# Patient Record
Sex: MALE | Race: WHITE | NOT HISPANIC OR LATINO | Employment: FULL TIME | URBAN - METROPOLITAN AREA
[De-identification: names, ages, dates, MRNs, and addresses within clinical notes are randomized per-mention and may not be internally consistent; named-entity substitution may affect disease eponyms.]

---

## 2017-02-22 RX ORDER — TAMSULOSIN HYDROCHLORIDE 0.4 MG/1
CAPSULE ORAL
Qty: 90 CAPSULE | Refills: 1 | Status: SHIPPED | OUTPATIENT
Start: 2017-02-22 | End: 2017-04-06 | Stop reason: SDUPTHER

## 2017-04-06 ENCOUNTER — TELEPHONE (OUTPATIENT)
Dept: INTERNAL MEDICINE | Facility: CLINIC | Age: 62
End: 2017-04-06

## 2017-04-06 RX ORDER — TAMSULOSIN HYDROCHLORIDE 0.4 MG/1
1 CAPSULE ORAL DAILY
Qty: 90 CAPSULE | Refills: 1 | Status: SHIPPED | OUTPATIENT
Start: 2017-04-06 | End: 2017-09-10 | Stop reason: SDUPTHER

## 2017-04-06 NOTE — TELEPHONE ENCOUNTER
----- Message from Anjelica Cabral sent at 4/5/2017  3:38 PM EDT -----  Refill: tamsuloson 0.4 mg (90 day supply ) @ [facility]

## 2017-04-18 RX ORDER — NIFEDIPINE 30 MG/1
TABLET, EXTENDED RELEASE ORAL
Qty: 90 TABLET | Refills: 0 | Status: SHIPPED | OUTPATIENT
Start: 2017-04-18 | End: 2017-05-19 | Stop reason: SDUPTHER

## 2017-04-18 RX ORDER — OMEPRAZOLE 20 MG/1
CAPSULE, DELAYED RELEASE ORAL
Qty: 90 CAPSULE | Refills: 0 | Status: SHIPPED | OUTPATIENT
Start: 2017-04-18 | End: 2017-05-19 | Stop reason: SDUPTHER

## 2017-05-19 ENCOUNTER — OFFICE VISIT (OUTPATIENT)
Dept: INTERNAL MEDICINE | Facility: CLINIC | Age: 62
End: 2017-05-19

## 2017-05-19 VITALS
DIASTOLIC BLOOD PRESSURE: 80 MMHG | WEIGHT: 160 LBS | SYSTOLIC BLOOD PRESSURE: 132 MMHG | HEIGHT: 74 IN | TEMPERATURE: 97.8 F | BODY MASS INDEX: 20.53 KG/M2

## 2017-05-19 DIAGNOSIS — N40.0 BENIGN PROSTATIC HYPERPLASIA, PRESENCE OF LOWER URINARY TRACT SYMPTOMS UNSPECIFIED, UNSPECIFIED MORPHOLOGY: ICD-10-CM

## 2017-05-19 DIAGNOSIS — C14.0 THROAT CANCER (HCC): ICD-10-CM

## 2017-05-19 DIAGNOSIS — Z11.59 NEED FOR HEPATITIS C SCREENING TEST: ICD-10-CM

## 2017-05-19 DIAGNOSIS — E03.9 ACQUIRED HYPOTHYROIDISM: ICD-10-CM

## 2017-05-19 DIAGNOSIS — E03.9 HYPOTHYROIDISM, UNSPECIFIED TYPE: ICD-10-CM

## 2017-05-19 DIAGNOSIS — I10 BENIGN ESSENTIAL HYPERTENSION: Primary | ICD-10-CM

## 2017-05-19 DIAGNOSIS — K21.9 GASTROESOPHAGEAL REFLUX DISEASE, ESOPHAGITIS PRESENCE NOT SPECIFIED: ICD-10-CM

## 2017-05-19 LAB
25(OH)D3 SERPL-MCNC: 26.6 NG/ML
ALBUMIN SERPL-MCNC: 4.3 G/DL (ref 3.2–4.8)
ALBUMIN/GLOB SERPL: 1.9 G/DL (ref 1.5–2.5)
ALP SERPL-CCNC: 62 U/L (ref 25–100)
ALT SERPL W P-5'-P-CCNC: 15 U/L (ref 7–40)
ANION GAP SERPL CALCULATED.3IONS-SCNC: 2 MMOL/L (ref 3–11)
AST SERPL-CCNC: 21 U/L (ref 0–33)
BASOPHILS # BLD AUTO: 0.08 10*3/MM3 (ref 0–0.2)
BASOPHILS NFR BLD AUTO: 1.4 % (ref 0–1)
BILIRUB SERPL-MCNC: 0.5 MG/DL (ref 0.3–1.2)
BUN BLD-MCNC: 19 MG/DL (ref 9–23)
BUN/CREAT SERPL: 21.1 (ref 7–25)
CALCIUM SPEC-SCNC: 10 MG/DL (ref 8.7–10.4)
CHLORIDE SERPL-SCNC: 107 MMOL/L (ref 99–109)
CO2 SERPL-SCNC: 30 MMOL/L (ref 20–31)
CREAT BLD-MCNC: 0.9 MG/DL (ref 0.6–1.3)
DEPRECATED RDW RBC AUTO: 41.5 FL (ref 37–54)
EOSINOPHIL # BLD AUTO: 0.25 10*3/MM3 (ref 0.1–0.3)
EOSINOPHIL NFR BLD AUTO: 4.3 % (ref 0–3)
ERYTHROCYTE [DISTWIDTH] IN BLOOD BY AUTOMATED COUNT: 13.2 % (ref 11.3–14.5)
GFR SERPL CREATININE-BSD FRML MDRD: 86 ML/MIN/1.73
GLOBULIN UR ELPH-MCNC: 2.3 GM/DL
GLUCOSE BLD-MCNC: 90 MG/DL (ref 70–100)
HCT VFR BLD AUTO: 43.3 % (ref 38.9–50.9)
HCV AB SER DONR QL: NORMAL
HGB BLD-MCNC: 14.5 G/DL (ref 13.1–17.5)
IMM GRANULOCYTES # BLD: 0.02 10*3/MM3 (ref 0–0.03)
IMM GRANULOCYTES NFR BLD: 0.3 % (ref 0–0.6)
LYMPHOCYTES # BLD AUTO: 1.11 10*3/MM3 (ref 0.6–4.8)
LYMPHOCYTES NFR BLD AUTO: 19.1 % (ref 24–44)
MCH RBC QN AUTO: 28.6 PG (ref 27–31)
MCHC RBC AUTO-ENTMCNC: 33.5 G/DL (ref 32–36)
MCV RBC AUTO: 85.4 FL (ref 80–99)
MONOCYTES # BLD AUTO: 0.82 10*3/MM3 (ref 0–1)
MONOCYTES NFR BLD AUTO: 14.1 % (ref 0–12)
NEUTROPHILS # BLD AUTO: 3.53 10*3/MM3 (ref 1.5–8.3)
NEUTROPHILS NFR BLD AUTO: 60.8 % (ref 41–71)
PLATELET # BLD AUTO: 168 10*3/MM3 (ref 150–450)
PMV BLD AUTO: 10.7 FL (ref 6–12)
POTASSIUM BLD-SCNC: 3.8 MMOL/L (ref 3.5–5.5)
PROT SERPL-MCNC: 6.6 G/DL (ref 5.7–8.2)
RBC # BLD AUTO: 5.07 10*6/MM3 (ref 4.2–5.76)
SODIUM BLD-SCNC: 139 MMOL/L (ref 132–146)
TSH SERPL DL<=0.05 MIU/L-ACNC: 0.94 MIU/ML (ref 0.35–5.35)
VIT B12 BLD-MCNC: 408 PG/ML (ref 211–911)
WBC NRBC COR # BLD: 5.81 10*3/MM3 (ref 3.5–10.8)

## 2017-05-19 PROCEDURE — 82306 VITAMIN D 25 HYDROXY: CPT | Performed by: NURSE PRACTITIONER

## 2017-05-19 PROCEDURE — 82607 VITAMIN B-12: CPT | Performed by: NURSE PRACTITIONER

## 2017-05-19 PROCEDURE — 80053 COMPREHEN METABOLIC PANEL: CPT | Performed by: NURSE PRACTITIONER

## 2017-05-19 PROCEDURE — 85025 COMPLETE CBC W/AUTO DIFF WBC: CPT | Performed by: NURSE PRACTITIONER

## 2017-05-19 PROCEDURE — 99214 OFFICE O/P EST MOD 30 MIN: CPT | Performed by: NURSE PRACTITIONER

## 2017-05-19 PROCEDURE — 86803 HEPATITIS C AB TEST: CPT | Performed by: NURSE PRACTITIONER

## 2017-05-19 PROCEDURE — 84443 ASSAY THYROID STIM HORMONE: CPT | Performed by: NURSE PRACTITIONER

## 2017-05-19 RX ORDER — MINOCYCLINE HYDROCHLORIDE 100 MG/1
CAPSULE ORAL
COMMUNITY
Start: 2017-05-18 | End: 2018-02-06

## 2017-05-19 RX ORDER — LEVOTHYROXINE SODIUM 0.1 MG/1
100 TABLET ORAL DAILY
Qty: 90 TABLET | Refills: 1 | Status: SHIPPED | OUTPATIENT
Start: 2017-05-19 | End: 2018-01-09 | Stop reason: SDUPTHER

## 2017-05-19 RX ORDER — NIFEDIPINE 30 MG/1
30 TABLET, EXTENDED RELEASE ORAL DAILY
Qty: 90 TABLET | Refills: 1 | Status: SHIPPED | OUTPATIENT
Start: 2017-05-19 | End: 2017-12-01 | Stop reason: SDUPTHER

## 2017-05-19 RX ORDER — OMEPRAZOLE 20 MG/1
20 CAPSULE, DELAYED RELEASE ORAL DAILY
Qty: 90 CAPSULE | Refills: 1 | Status: SHIPPED | OUTPATIENT
Start: 2017-05-19 | End: 2017-12-01 | Stop reason: SDUPTHER

## 2017-09-11 RX ORDER — TAMSULOSIN HYDROCHLORIDE 0.4 MG/1
CAPSULE ORAL
Qty: 90 CAPSULE | Refills: 0 | Status: SHIPPED | OUTPATIENT
Start: 2017-09-11 | End: 2017-12-01 | Stop reason: SDUPTHER

## 2017-12-01 DIAGNOSIS — I10 BENIGN ESSENTIAL HYPERTENSION: ICD-10-CM

## 2017-12-01 DIAGNOSIS — K21.9 GASTROESOPHAGEAL REFLUX DISEASE, ESOPHAGITIS PRESENCE NOT SPECIFIED: ICD-10-CM

## 2017-12-01 RX ORDER — TAMSULOSIN HYDROCHLORIDE 0.4 MG/1
CAPSULE ORAL
Qty: 90 CAPSULE | Refills: 0 | Status: SHIPPED | OUTPATIENT
Start: 2017-12-01 | End: 2018-01-09 | Stop reason: SDUPTHER

## 2017-12-01 RX ORDER — NIFEDIPINE 30 MG/1
TABLET, EXTENDED RELEASE ORAL
Qty: 90 TABLET | Refills: 0 | Status: SHIPPED | OUTPATIENT
Start: 2017-12-01 | End: 2018-01-09 | Stop reason: SDUPTHER

## 2017-12-01 RX ORDER — OMEPRAZOLE 20 MG/1
CAPSULE, DELAYED RELEASE ORAL
Qty: 90 CAPSULE | Refills: 0 | Status: SHIPPED | OUTPATIENT
Start: 2017-12-01 | End: 2018-01-09 | Stop reason: SDUPTHER

## 2018-01-09 ENCOUNTER — TELEPHONE (OUTPATIENT)
Dept: INTERNAL MEDICINE | Facility: CLINIC | Age: 63
End: 2018-01-09

## 2018-01-09 DIAGNOSIS — I10 BENIGN ESSENTIAL HYPERTENSION: ICD-10-CM

## 2018-01-09 DIAGNOSIS — K21.9 GASTROESOPHAGEAL REFLUX DISEASE, ESOPHAGITIS PRESENCE NOT SPECIFIED: ICD-10-CM

## 2018-01-09 DIAGNOSIS — E03.9 ACQUIRED HYPOTHYROIDISM: ICD-10-CM

## 2018-01-09 RX ORDER — OMEPRAZOLE 20 MG/1
20 CAPSULE, DELAYED RELEASE ORAL DAILY
Qty: 30 CAPSULE | Refills: 0 | Status: SHIPPED | OUTPATIENT
Start: 2018-01-09 | End: 2018-02-06 | Stop reason: SDUPTHER

## 2018-01-09 RX ORDER — TAMSULOSIN HYDROCHLORIDE 0.4 MG/1
1 CAPSULE ORAL DAILY
Qty: 30 CAPSULE | Refills: 0 | Status: SHIPPED | OUTPATIENT
Start: 2018-01-09 | End: 2018-02-06 | Stop reason: SDUPTHER

## 2018-01-09 RX ORDER — LEVOTHYROXINE SODIUM 0.1 MG/1
100 TABLET ORAL DAILY
Qty: 30 TABLET | Refills: 0 | Status: SHIPPED | OUTPATIENT
Start: 2018-01-09 | End: 2018-02-16 | Stop reason: SDUPTHER

## 2018-01-09 RX ORDER — NIFEDIPINE 30 MG/1
30 TABLET, EXTENDED RELEASE ORAL DAILY
Qty: 30 TABLET | Refills: 0 | Status: SHIPPED | OUTPATIENT
Start: 2018-01-09 | End: 2018-02-06 | Stop reason: SDUPTHER

## 2018-01-09 NOTE — TELEPHONE ENCOUNTER
PATIENT HAS UPCOMING APPOINTMENT ON 2/6 BUT NEEDS REFILLS ON [facility] NIFEdipine XL (PROCARDIA XL) 30 MG 24 hr tablet levothyroxine (SYNTHROID) 100 MCG tabletomeprazole (priLOSEC) 20 MG capsule tamsulosin (FLOMAX) 0.4 MG capsule 24 hr capsule

## 2018-02-06 ENCOUNTER — OFFICE VISIT (OUTPATIENT)
Dept: INTERNAL MEDICINE | Facility: CLINIC | Age: 63
End: 2018-02-06

## 2018-02-06 VITALS
SYSTOLIC BLOOD PRESSURE: 144 MMHG | RESPIRATION RATE: 16 BRPM | BODY MASS INDEX: 21.58 KG/M2 | DIASTOLIC BLOOD PRESSURE: 80 MMHG | OXYGEN SATURATION: 98 % | WEIGHT: 168.2 LBS | TEMPERATURE: 98.7 F | HEIGHT: 74 IN | HEART RATE: 81 BPM

## 2018-02-06 DIAGNOSIS — N52.9 IMPOTENCE OF ORGANIC ORIGIN: ICD-10-CM

## 2018-02-06 DIAGNOSIS — N40.0 BENIGN PROSTATIC HYPERPLASIA, UNSPECIFIED WHETHER LOWER URINARY TRACT SYMPTOMS PRESENT: ICD-10-CM

## 2018-02-06 DIAGNOSIS — E03.9 ACQUIRED HYPOTHYROIDISM: Primary | ICD-10-CM

## 2018-02-06 DIAGNOSIS — K21.9 GASTROESOPHAGEAL REFLUX DISEASE, ESOPHAGITIS PRESENCE NOT SPECIFIED: ICD-10-CM

## 2018-02-06 DIAGNOSIS — I10 BENIGN ESSENTIAL HYPERTENSION: ICD-10-CM

## 2018-02-06 LAB
25(OH)D3 SERPL-MCNC: 17 NG/ML
ALBUMIN SERPL-MCNC: 4.7 G/DL (ref 3.2–4.8)
ALBUMIN/GLOB SERPL: 2 G/DL (ref 1.5–2.5)
ALP SERPL-CCNC: 63 U/L (ref 25–100)
ALT SERPL W P-5'-P-CCNC: 19 U/L (ref 7–40)
ANION GAP SERPL CALCULATED.3IONS-SCNC: 5 MMOL/L (ref 3–11)
ARTICHOKE IGE QN: 128 MG/DL (ref 0–130)
AST SERPL-CCNC: 26 U/L (ref 0–33)
BASOPHILS # BLD AUTO: 0.06 10*3/MM3 (ref 0–0.2)
BASOPHILS NFR BLD AUTO: 1 % (ref 0–1)
BILIRUB SERPL-MCNC: 0.6 MG/DL (ref 0.3–1.2)
BUN BLD-MCNC: 15 MG/DL (ref 9–23)
BUN/CREAT SERPL: 18.8 (ref 7–25)
CALCIUM SPEC-SCNC: 9.3 MG/DL (ref 8.7–10.4)
CHLORIDE SERPL-SCNC: 104 MMOL/L (ref 99–109)
CHOLEST SERPL-MCNC: 190 MG/DL (ref 0–200)
CO2 SERPL-SCNC: 32 MMOL/L (ref 20–31)
CREAT BLD-MCNC: 0.8 MG/DL (ref 0.6–1.3)
DEPRECATED RDW RBC AUTO: 40.4 FL (ref 37–54)
EOSINOPHIL # BLD AUTO: 0.2 10*3/MM3 (ref 0–0.3)
EOSINOPHIL NFR BLD AUTO: 3.2 % (ref 0–3)
ERYTHROCYTE [DISTWIDTH] IN BLOOD BY AUTOMATED COUNT: 13.1 % (ref 11.3–14.5)
GFR SERPL CREATININE-BSD FRML MDRD: 99 ML/MIN/1.73
GLOBULIN UR ELPH-MCNC: 2.3 GM/DL
GLUCOSE BLD-MCNC: 94 MG/DL (ref 70–100)
HCT VFR BLD AUTO: 45.5 % (ref 38.9–50.9)
HDLC SERPL-MCNC: 63 MG/DL (ref 40–60)
HGB BLD-MCNC: 15.3 G/DL (ref 13.1–17.5)
IMM GRANULOCYTES # BLD: 0.02 10*3/MM3 (ref 0–0.03)
IMM GRANULOCYTES NFR BLD: 0.3 % (ref 0–0.6)
LYMPHOCYTES # BLD AUTO: 1.41 10*3/MM3 (ref 0.6–4.8)
LYMPHOCYTES NFR BLD AUTO: 22.4 % (ref 24–44)
MCH RBC QN AUTO: 28.5 PG (ref 27–31)
MCHC RBC AUTO-ENTMCNC: 33.6 G/DL (ref 32–36)
MCV RBC AUTO: 84.9 FL (ref 80–99)
MONOCYTES # BLD AUTO: 0.79 10*3/MM3 (ref 0–1)
MONOCYTES NFR BLD AUTO: 12.5 % (ref 0–12)
NEUTROPHILS # BLD AUTO: 3.82 10*3/MM3 (ref 1.5–8.3)
NEUTROPHILS NFR BLD AUTO: 60.6 % (ref 41–71)
PLATELET # BLD AUTO: 189 10*3/MM3 (ref 150–450)
PMV BLD AUTO: 10.9 FL (ref 6–12)
POTASSIUM BLD-SCNC: 3.8 MMOL/L (ref 3.5–5.5)
PROT SERPL-MCNC: 7 G/DL (ref 5.7–8.2)
RBC # BLD AUTO: 5.36 10*6/MM3 (ref 4.2–5.76)
SODIUM BLD-SCNC: 141 MMOL/L (ref 132–146)
TRIGL SERPL-MCNC: 84 MG/DL (ref 0–150)
TSH SERPL DL<=0.05 MIU/L-ACNC: 1.4 MIU/ML (ref 0.35–5.35)
VIT B12 BLD-MCNC: 520 PG/ML (ref 211–911)
WBC NRBC COR # BLD: 6.3 10*3/MM3 (ref 3.5–10.8)

## 2018-02-06 PROCEDURE — 85025 COMPLETE CBC W/AUTO DIFF WBC: CPT | Performed by: NURSE PRACTITIONER

## 2018-02-06 PROCEDURE — 80053 COMPREHEN METABOLIC PANEL: CPT | Performed by: NURSE PRACTITIONER

## 2018-02-06 PROCEDURE — 82607 VITAMIN B-12: CPT | Performed by: NURSE PRACTITIONER

## 2018-02-06 PROCEDURE — 84443 ASSAY THYROID STIM HORMONE: CPT | Performed by: NURSE PRACTITIONER

## 2018-02-06 PROCEDURE — 80061 LIPID PANEL: CPT | Performed by: NURSE PRACTITIONER

## 2018-02-06 PROCEDURE — 99214 OFFICE O/P EST MOD 30 MIN: CPT | Performed by: NURSE PRACTITIONER

## 2018-02-06 PROCEDURE — 82306 VITAMIN D 25 HYDROXY: CPT | Performed by: NURSE PRACTITIONER

## 2018-02-06 RX ORDER — NIFEDIPINE 30 MG/1
30 TABLET, EXTENDED RELEASE ORAL DAILY
Qty: 90 TABLET | Refills: 1 | Status: SHIPPED | OUTPATIENT
Start: 2018-02-06 | End: 2018-08-30 | Stop reason: SDUPTHER

## 2018-02-06 RX ORDER — TAMSULOSIN HYDROCHLORIDE 0.4 MG/1
1 CAPSULE ORAL DAILY
Qty: 90 CAPSULE | Refills: 1 | Status: SHIPPED | OUTPATIENT
Start: 2018-02-06 | End: 2018-09-02 | Stop reason: SDUPTHER

## 2018-02-06 RX ORDER — LEVOTHYROXINE SODIUM 0.1 MG/1
100 TABLET ORAL DAILY
Qty: 90 TABLET | Refills: 1 | Status: CANCELLED | OUTPATIENT
Start: 2018-02-06

## 2018-02-06 RX ORDER — SILDENAFIL CITRATE 20 MG/1
TABLET ORAL
Qty: 50 TABLET | Refills: 2 | Status: SHIPPED | OUTPATIENT
Start: 2018-02-06 | End: 2019-02-05 | Stop reason: SDUPTHER

## 2018-02-06 RX ORDER — OMEPRAZOLE 20 MG/1
20 CAPSULE, DELAYED RELEASE ORAL DAILY
Qty: 90 CAPSULE | Refills: 1 | Status: SHIPPED | OUTPATIENT
Start: 2018-02-06 | End: 2020-01-14 | Stop reason: SDUPTHER

## 2018-02-06 NOTE — PROGRESS NOTES
Subjective   Booen Riley is a 62 y.o. male    Chief Complaint   Patient presents with   • Follow-up     Fasting for lab work   • Hypothyroidism   • Hypertension   • Heartburn     History of Present Illness     Here for f/u.  Last OV was in May 2017    BPH - Seeing Urology (Mario).  Has him on the Flomax.  Takes PRN meds for ED.  He would like a script for the 20mg Sildenafil as he has heard it would be much less expensive.       HTN is well controlled on Procardia XL 30mg; did not tolerate an ACE or an ARB.  BP has been running in the 120-130/70-75 at home.        Hypothyroid - currently taking 100mcg of Synthroid; does feel more tired than normal      GERD - chronic and stable on Omeprazole; does well as long as he takes this daily      H/O throat (base of tongue) cancer.  Followed by Dr. CAMACHO.  All recent reports have been good.  Has an upcoming scope and will more than likely be dilated.        Flu shot - fall 2017  Tdap - 2014  PSA - per urology  colon - due 2018; they have contacted him to schedule    The following portions of the patient's history were reviewed and updated as appropriate: allergies, current medications, past family history, past medical history, past social history, past surgical history and problem list.    Current Outpatient Prescriptions:   •  levothyroxine (SYNTHROID) 100 MCG tablet, Take 1 tablet by mouth Daily., Disp: 30 tablet, Rfl: 0  •  NIFEdipine XL (PROCARDIA XL) 30 MG 24 hr tablet, Take 1 tablet by mouth Daily., Disp: 90 tablet, Rfl: 1  •  omeprazole (priLOSEC) 20 MG capsule, Take 1 capsule by mouth Daily., Disp: 90 capsule, Rfl: 1  •  tamsulosin (FLOMAX) 0.4 MG capsule 24 hr capsule, Take 1 capsule by mouth Daily., Disp: 90 capsule, Rfl: 1  •  minocycline (MINOCIN,DYNACIN) 100 MG capsule, , Disp: , Rfl:   •  pilocarpine (SALAGEN) 7.5 MG tablet, take 1 tablet by mouth once daily, Disp: , Rfl: 0  •  sildenafil (REVATIO) 20 MG tablet, 2-5 PO as needed for sexual activity, Disp:  "50 tablet, Rfl: 2     Review of Systems   Constitutional: Negative for chills, fatigue and fever.   Respiratory: Negative for cough, chest tightness and shortness of breath.    Cardiovascular: Negative for chest pain.   Gastrointestinal: Negative for abdominal pain, diarrhea, nausea and vomiting.   Endocrine: Negative for cold intolerance and heat intolerance.   Musculoskeletal: Negative for arthralgias.   Neurological: Negative for dizziness.       Objective   Physical Exam   Constitutional: He is oriented to person, place, and time. He appears well-developed and well-nourished.   HENT:   Head: Normocephalic and atraumatic.   Eyes: Conjunctivae and EOM are normal. Pupils are equal, round, and reactive to light.   Neck: Normal range of motion.   Cardiovascular: Normal rate, regular rhythm and normal heart sounds.    Pulmonary/Chest: Effort normal and breath sounds normal.   Abdominal: Soft. Bowel sounds are normal.   Musculoskeletal: Normal range of motion.   Neurological: He is alert and oriented to person, place, and time. He has normal reflexes.   Skin: Skin is warm and dry.   Psychiatric: He has a normal mood and affect. His behavior is normal. Judgment and thought content normal.     Vitals:    02/06/18 1340   BP: 144/80   Pulse: 81   Resp: 16   Temp: 98.7 °F (37.1 °C)   TempSrc: Temporal Artery    SpO2: 98%   Weight: 76.3 kg (168 lb 3.2 oz)   Height: 188 cm (74.02\")         Assessment/Plan   Boone was seen today for follow-up, hypothyroidism, hypertension and heartburn.    Diagnoses and all orders for this visit:    Acquired hypothyroidism  -     CBC & Differential  -     Comprehensive Metabolic Panel  -     Lipid Panel  -     Vitamin D 25 Hydroxy  -     Vitamin B12  -     TSH    Benign essential hypertension  -     NIFEdipine XL (PROCARDIA XL) 30 MG 24 hr tablet; Take 1 tablet by mouth Daily.  -     CBC & Differential  -     Comprehensive Metabolic Panel  -     Lipid Panel  -     Vitamin D 25 Hydroxy  -     " Vitamin B12  -     TSH    Gastroesophageal reflux disease, esophagitis presence not specified  -     omeprazole (priLOSEC) 20 MG capsule; Take 1 capsule by mouth Daily.  -     CBC & Differential  -     Comprehensive Metabolic Panel  -     Lipid Panel  -     Vitamin D 25 Hydroxy  -     Vitamin B12  -     TSH    Benign prostatic hyperplasia, unspecified whether lower urinary tract symptoms present  -     tamsulosin (FLOMAX) 0.4 MG capsule 24 hr capsule; Take 1 capsule by mouth Daily.  -     CBC & Differential  -     Comprehensive Metabolic Panel  -     Lipid Panel  -     Vitamin D 25 Hydroxy  -     Vitamin B12  -     TSH    Impotence of organic origin  -     CBC & Differential  -     Comprehensive Metabolic Panel  -     Lipid Panel  -     Vitamin D 25 Hydroxy  -     Vitamin B12  -     TSH  -     sildenafil (REVATIO) 20 MG tablet; 2-5 PO as needed for sexual activity    Other orders  -     Cancel: levothyroxine (SYNTHROID) 100 MCG tablet; Take 1 tablet by mouth Daily.      Labs sent today  Meds refilled  Will hold off on synthroid refill until I see labs  Generic sildenafil script sent in  RTC in 6 months for PE, or sooner with any problems

## 2018-02-15 RX ORDER — CHOLECALCIFEROL (VITAMIN D3) 1250 MCG
50000 CAPSULE ORAL
Qty: 8 CAPSULE | Refills: 0 | Status: SHIPPED | OUTPATIENT
Start: 2018-02-15 | End: 2018-02-16 | Stop reason: SDUPTHER

## 2018-02-16 ENCOUNTER — TELEPHONE (OUTPATIENT)
Dept: INTERNAL MEDICINE | Facility: CLINIC | Age: 63
End: 2018-02-16

## 2018-02-16 DIAGNOSIS — E03.9 ACQUIRED HYPOTHYROIDISM: ICD-10-CM

## 2018-02-16 RX ORDER — LEVOTHYROXINE SODIUM 0.1 MG/1
100 TABLET ORAL DAILY
Qty: 90 TABLET | Refills: 1 | Status: SHIPPED | OUTPATIENT
Start: 2018-02-16 | End: 2018-09-20 | Stop reason: SDUPTHER

## 2018-02-16 RX ORDER — CHOLECALCIFEROL (VITAMIN D3) 1250 MCG
50000 CAPSULE ORAL
Qty: 8 CAPSULE | Refills: 0 | Status: SHIPPED | OUTPATIENT
Start: 2018-02-16 | End: 2018-04-07

## 2018-06-28 DIAGNOSIS — I10 BENIGN ESSENTIAL HYPERTENSION: ICD-10-CM

## 2018-06-28 RX ORDER — NIFEDIPINE 30 MG/1
30 TABLET, EXTENDED RELEASE ORAL DAILY
Qty: 90 TABLET | OUTPATIENT
Start: 2018-06-28

## 2018-08-01 DIAGNOSIS — N40.0 BENIGN PROSTATIC HYPERPLASIA, UNSPECIFIED WHETHER LOWER URINARY TRACT SYMPTOMS PRESENT: ICD-10-CM

## 2018-08-01 RX ORDER — TAMSULOSIN HYDROCHLORIDE 0.4 MG/1
1 CAPSULE ORAL DAILY
Qty: 90 CAPSULE | OUTPATIENT
Start: 2018-08-01

## 2018-08-07 ENCOUNTER — OFFICE VISIT (OUTPATIENT)
Dept: INTERNAL MEDICINE | Facility: CLINIC | Age: 63
End: 2018-08-07

## 2018-08-07 VITALS
WEIGHT: 164 LBS | SYSTOLIC BLOOD PRESSURE: 138 MMHG | HEIGHT: 74 IN | HEART RATE: 81 BPM | DIASTOLIC BLOOD PRESSURE: 80 MMHG | OXYGEN SATURATION: 98 % | RESPIRATION RATE: 16 BRPM | BODY MASS INDEX: 21.05 KG/M2 | TEMPERATURE: 98.9 F

## 2018-08-07 DIAGNOSIS — E03.9 HYPOTHYROIDISM, UNSPECIFIED TYPE: ICD-10-CM

## 2018-08-07 DIAGNOSIS — Z00.00 ANNUAL PHYSICAL EXAM: Primary | ICD-10-CM

## 2018-08-07 DIAGNOSIS — K21.9 GASTROESOPHAGEAL REFLUX DISEASE, ESOPHAGITIS PRESENCE NOT SPECIFIED: ICD-10-CM

## 2018-08-07 DIAGNOSIS — Z12.11 SCREENING FOR COLON CANCER: ICD-10-CM

## 2018-08-07 DIAGNOSIS — I10 BENIGN ESSENTIAL HYPERTENSION: ICD-10-CM

## 2018-08-07 PROCEDURE — 99396 PREV VISIT EST AGE 40-64: CPT | Performed by: NURSE PRACTITIONER

## 2018-08-07 NOTE — PROGRESS NOTES
Subjective   Boone Riley is a 62 y.o. male    Chief Complaint   Patient presents with   • Annual Exam     History of Present Illness     Here for PE    BPH - Seeing Urology (Mario).  Has him on the Flomax.  Takes PRN meds for ED.  He would like a script for the 20mg Sildenafil as he has heard it would be much less expensive.       HTN is well controlled on Procardia XL 30mg; did not tolerate an ACE or an ARB.  BP has been running in the 120-130/70-75 at home.        Hypothyroid - currently taking 100mcg of Synthroid      GERD - chronic and stable on Omeprazole 40mg daily; had recent scope and it was increased to 40mg       H/O throat (base of tongue) cancer.  Followed by Dr. CAMACHO.  All recent reports have been good. Due for f/u soon      Flu shot - fall 2017  Tdap - 2014  PSA - per urology, within the past few months  colon - due 2018; will order    Diet - very healthy    Exercise - minimal    Social History   Substance Use Topics   • Smoking status: Former Smoker   • Smokeless tobacco: Never Used   • Alcohol use No         The following portions of the patient's history were reviewed and updated as appropriate: allergies, current medications, past family history, past medical history, past social history, past surgical history and problem list.    Current Outpatient Prescriptions:   •  levothyroxine (SYNTHROID) 100 MCG tablet, Take 1 tablet by mouth Daily., Disp: 90 tablet, Rfl: 1  •  NIFEdipine XL (PROCARDIA XL) 30 MG 24 hr tablet, Take 1 tablet by mouth Daily., Disp: 90 tablet, Rfl: 1  •  omeprazole (priLOSEC) 20 MG capsule, Take 1 capsule by mouth Daily., Disp: 90 capsule, Rfl: 1  •  sildenafil (REVATIO) 20 MG tablet, 2-5 PO as needed for sexual activity, Disp: 50 tablet, Rfl: 2  •  tamsulosin (FLOMAX) 0.4 MG capsule 24 hr capsule, Take 1 capsule by mouth Daily., Disp: 90 capsule, Rfl: 1     Review of Systems   Constitutional: Negative for appetite change, chills, fatigue, fever and unexpected weight  change.   HENT: Negative for congestion, ear pain, nosebleeds, rhinorrhea and tinnitus.    Eyes: Negative for pain.   Respiratory: Negative for cough, chest tightness and shortness of breath.    Cardiovascular: Negative for chest pain, palpitations and leg swelling.   Gastrointestinal: Negative for abdominal distention, abdominal pain, blood in stool, constipation, diarrhea, nausea and vomiting.   Endocrine: Negative for cold intolerance, heat intolerance, polydipsia, polyphagia and polyuria.   Genitourinary: Negative for dysuria, flank pain, frequency, hematuria and urgency.   Musculoskeletal: Negative for arthralgias, back pain, gait problem, joint swelling, myalgias and neck pain.   Skin: Negative for color change, pallor, rash and wound.   Allergic/Immunologic: Negative for environmental allergies and food allergies.   Neurological: Negative for dizziness, syncope, weakness, light-headedness, numbness and headaches.   Hematological: Negative for adenopathy. Does not bruise/bleed easily.   Psychiatric/Behavioral: Negative for behavioral problems and suicidal ideas. The patient is not nervous/anxious.        Objective   Physical Exam   Constitutional: He is oriented to person, place, and time. Vital signs are normal. He appears well-developed and well-nourished.   HENT:   Head: Normocephalic and atraumatic.   Right Ear: External ear normal.   Left Ear: External ear normal.   Nose: Nose normal.   Mouth/Throat: Oropharynx is clear and moist.   Eyes: Pupils are equal, round, and reactive to light. Conjunctivae, EOM and lids are normal.   Neck: Normal range of motion. Neck supple. Carotid bruit is not present.   Cardiovascular: Normal rate, regular rhythm, normal heart sounds and intact distal pulses.    Pulmonary/Chest: Effort normal and breath sounds normal.   Abdominal: Soft. Bowel sounds are normal. There is no hepatosplenomegaly, splenomegaly or hepatomegaly. No hernia.   Genitourinary: Rectum normal, prostate  "normal and penis normal. Rectal exam shows guaiac negative stool.   Musculoskeletal: Normal range of motion.   Lymphadenopathy:     He has no cervical adenopathy.   Neurological: He is alert and oriented to person, place, and time. He has normal reflexes.   Skin: Skin is warm, dry and intact.   Psychiatric: He has a normal mood and affect. His behavior is normal. Judgment and thought content normal.     Vitals:    08/07/18 1325   BP: 138/80   Pulse: 81   Resp: 16   Temp: 98.9 °F (37.2 °C)   TempSrc: Temporal Artery    SpO2: 98%   Weight: 74.4 kg (164 lb)   Height: 188 cm (74.02\")         Assessment/Plan   Boone was seen today for annual exam.    Diagnoses and all orders for this visit:    Annual physical exam  -     Cancel: POC Urinalysis Dipstick, Automated  -     CBC & Differential; Future  -     Comprehensive Metabolic Panel; Future  -     Lipid Panel; Future  -     Vitamin D 25 Hydroxy; Future  -     Vitamin B12; Future  -     TSH; Future  -     T4, Free; Future  -     POC Urinalysis Dipstick, Automated; Future    Screening for colon cancer  -     Ambulatory Referral For Screening Colonoscopy  -     CBC & Differential; Future  -     Comprehensive Metabolic Panel; Future  -     Lipid Panel; Future  -     Vitamin D 25 Hydroxy; Future  -     Vitamin B12; Future  -     TSH; Future  -     T4, Free; Future    Benign essential hypertension  -     CBC & Differential; Future  -     Comprehensive Metabolic Panel; Future  -     Lipid Panel; Future  -     Vitamin D 25 Hydroxy; Future  -     Vitamin B12; Future  -     TSH; Future  -     T4, Free; Future    Gastroesophageal reflux disease, esophagitis presence not specified  -     CBC & Differential; Future  -     Comprehensive Metabolic Panel; Future  -     Lipid Panel; Future  -     Vitamin D 25 Hydroxy; Future  -     Vitamin B12; Future  -     TSH; Future  -     T4, Free; Future    Hypothyroidism, unspecified type  -     CBC & Differential; Future  -     Comprehensive " Metabolic Panel; Future  -     Lipid Panel; Future  -     Vitamin D 25 Hydroxy; Future  -     Vitamin B12; Future  -     TSH; Future  -     T4, Free; Future      Frederick is not fasting, so he will RTC for labs  Colonoscopy ordered  No refills needed  Counseling - diet and exercise  Will ck on Shingrix at pharmacy  Return in about 6 months (around 2/7/2019).

## 2018-08-10 ENCOUNTER — LAB (OUTPATIENT)
Dept: INTERNAL MEDICINE | Facility: CLINIC | Age: 63
End: 2018-08-10

## 2018-08-10 DIAGNOSIS — K21.9 GASTROESOPHAGEAL REFLUX DISEASE, ESOPHAGITIS PRESENCE NOT SPECIFIED: ICD-10-CM

## 2018-08-10 DIAGNOSIS — Z12.11 SCREENING FOR COLON CANCER: ICD-10-CM

## 2018-08-10 DIAGNOSIS — I10 BENIGN ESSENTIAL HYPERTENSION: ICD-10-CM

## 2018-08-10 DIAGNOSIS — E03.9 HYPOTHYROIDISM, UNSPECIFIED TYPE: ICD-10-CM

## 2018-08-10 DIAGNOSIS — Z00.00 ANNUAL PHYSICAL EXAM: ICD-10-CM

## 2018-08-10 LAB
25(OH)D3 SERPL-MCNC: 35.7 NG/ML
ALBUMIN SERPL-MCNC: 4.46 G/DL (ref 3.2–4.8)
ALBUMIN/GLOB SERPL: 2.2 G/DL (ref 1.5–2.5)
ALP SERPL-CCNC: 61 U/L (ref 25–100)
ALT SERPL W P-5'-P-CCNC: 14 U/L (ref 7–40)
ANION GAP SERPL CALCULATED.3IONS-SCNC: 4 MMOL/L (ref 3–11)
ARTICHOKE IGE QN: 101 MG/DL (ref 0–130)
AST SERPL-CCNC: 19 U/L (ref 0–33)
BASOPHILS # BLD AUTO: 0.06 10*3/MM3 (ref 0–0.2)
BASOPHILS NFR BLD AUTO: 0.9 % (ref 0–1)
BILIRUB SERPL-MCNC: 0.8 MG/DL (ref 0.3–1.2)
BUN BLD-MCNC: 13 MG/DL (ref 9–23)
BUN/CREAT SERPL: 12.6 (ref 7–25)
CALCIUM SPEC-SCNC: 9.3 MG/DL (ref 8.7–10.4)
CHLORIDE SERPL-SCNC: 105 MMOL/L (ref 99–109)
CHOLEST SERPL-MCNC: 156 MG/DL (ref 0–200)
CO2 SERPL-SCNC: 33 MMOL/L (ref 20–31)
CREAT BLD-MCNC: 1.03 MG/DL (ref 0.6–1.3)
DEPRECATED RDW RBC AUTO: 41.3 FL (ref 37–54)
EOSINOPHIL # BLD AUTO: 0.24 10*3/MM3 (ref 0–0.3)
EOSINOPHIL NFR BLD AUTO: 3.5 % (ref 0–3)
ERYTHROCYTE [DISTWIDTH] IN BLOOD BY AUTOMATED COUNT: 13.3 % (ref 11.3–14.5)
GFR SERPL CREATININE-BSD FRML MDRD: 74 ML/MIN/1.73
GLOBULIN UR ELPH-MCNC: 2 GM/DL
GLUCOSE BLD-MCNC: 86 MG/DL (ref 70–100)
HCT VFR BLD AUTO: 44.2 % (ref 38.9–50.9)
HDLC SERPL-MCNC: 58 MG/DL (ref 40–60)
HGB BLD-MCNC: 14.6 G/DL (ref 13.1–17.5)
IMM GRANULOCYTES # BLD: 0.01 10*3/MM3 (ref 0–0.03)
IMM GRANULOCYTES NFR BLD: 0.1 % (ref 0–0.6)
LYMPHOCYTES # BLD AUTO: 0.85 10*3/MM3 (ref 0.6–4.8)
LYMPHOCYTES NFR BLD AUTO: 12.5 % (ref 24–44)
MCH RBC QN AUTO: 28.1 PG (ref 27–31)
MCHC RBC AUTO-ENTMCNC: 33 G/DL (ref 32–36)
MCV RBC AUTO: 85 FL (ref 80–99)
MONOCYTES # BLD AUTO: 0.87 10*3/MM3 (ref 0–1)
MONOCYTES NFR BLD AUTO: 12.8 % (ref 0–12)
NEUTROPHILS # BLD AUTO: 4.79 10*3/MM3 (ref 1.5–8.3)
NEUTROPHILS NFR BLD AUTO: 70.3 % (ref 41–71)
PLATELET # BLD AUTO: 195 10*3/MM3 (ref 150–450)
PMV BLD AUTO: 10.8 FL (ref 6–12)
POTASSIUM BLD-SCNC: 3.8 MMOL/L (ref 3.5–5.5)
PROT SERPL-MCNC: 6.5 G/DL (ref 5.7–8.2)
RBC # BLD AUTO: 5.2 10*6/MM3 (ref 4.2–5.76)
SODIUM BLD-SCNC: 142 MMOL/L (ref 132–146)
T4 FREE SERPL-MCNC: 1.38 NG/DL (ref 0.89–1.76)
TRIGL SERPL-MCNC: 52 MG/DL (ref 0–150)
TSH SERPL DL<=0.05 MIU/L-ACNC: 1.96 MIU/ML (ref 0.35–5.35)
VIT B12 BLD-MCNC: 424 PG/ML (ref 211–911)
WBC NRBC COR # BLD: 6.81 10*3/MM3 (ref 3.5–10.8)

## 2018-08-10 PROCEDURE — 82306 VITAMIN D 25 HYDROXY: CPT | Performed by: NURSE PRACTITIONER

## 2018-08-10 PROCEDURE — 80053 COMPREHEN METABOLIC PANEL: CPT | Performed by: NURSE PRACTITIONER

## 2018-08-10 PROCEDURE — 85025 COMPLETE CBC W/AUTO DIFF WBC: CPT | Performed by: NURSE PRACTITIONER

## 2018-08-10 PROCEDURE — 84443 ASSAY THYROID STIM HORMONE: CPT | Performed by: NURSE PRACTITIONER

## 2018-08-10 PROCEDURE — 84439 ASSAY OF FREE THYROXINE: CPT | Performed by: NURSE PRACTITIONER

## 2018-08-10 PROCEDURE — 80061 LIPID PANEL: CPT | Performed by: NURSE PRACTITIONER

## 2018-08-10 PROCEDURE — 82607 VITAMIN B-12: CPT | Performed by: NURSE PRACTITIONER

## 2018-08-22 DIAGNOSIS — R79.89 ABNORMAL CBC: Primary | ICD-10-CM

## 2018-08-29 ENCOUNTER — TELEPHONE (OUTPATIENT)
Dept: INTERNAL MEDICINE | Facility: CLINIC | Age: 63
End: 2018-08-29

## 2018-08-29 NOTE — TELEPHONE ENCOUNTER
Frederick has been notified. He will try and stop by in the morning for repeat CBC. Order has been entered.

## 2018-08-29 NOTE — TELEPHONE ENCOUNTER
----- Message from KIERSTEN Thorpe sent at 8/22/2018 10:16 PM EDT -----  Please let pt know that overall his labs look really good.  His CBC shows that his allergies may be flaring, or he could be fighting off a viral illness.  I would like to repeat this lab this week or next.

## 2018-08-30 DIAGNOSIS — I10 BENIGN ESSENTIAL HYPERTENSION: ICD-10-CM

## 2018-08-31 RX ORDER — NIFEDIPINE 30 MG/1
30 TABLET, EXTENDED RELEASE ORAL DAILY
Qty: 90 TABLET | Refills: 1 | Status: SHIPPED | OUTPATIENT
Start: 2018-08-31 | End: 2018-12-22 | Stop reason: SDUPTHER

## 2018-09-02 DIAGNOSIS — N40.0 BENIGN PROSTATIC HYPERPLASIA, UNSPECIFIED WHETHER LOWER URINARY TRACT SYMPTOMS PRESENT: ICD-10-CM

## 2018-09-04 RX ORDER — TAMSULOSIN HYDROCHLORIDE 0.4 MG/1
1 CAPSULE ORAL DAILY
Qty: 90 CAPSULE | Refills: 0 | Status: SHIPPED | OUTPATIENT
Start: 2018-09-04 | End: 2018-10-28 | Stop reason: SDUPTHER

## 2018-09-14 ENCOUNTER — LAB (OUTPATIENT)
Dept: INTERNAL MEDICINE | Facility: CLINIC | Age: 63
End: 2018-09-14

## 2018-09-14 DIAGNOSIS — R79.89 ABNORMAL CBC: ICD-10-CM

## 2018-09-14 LAB
BASOPHILS # BLD AUTO: 0.06 10*3/MM3 (ref 0–0.2)
BASOPHILS NFR BLD AUTO: 0.5 % (ref 0–1)
DEPRECATED RDW RBC AUTO: 41.8 FL (ref 37–54)
EOSINOPHIL # BLD AUTO: 0.22 10*3/MM3 (ref 0–0.3)
EOSINOPHIL NFR BLD AUTO: 1.8 % (ref 0–3)
ERYTHROCYTE [DISTWIDTH] IN BLOOD BY AUTOMATED COUNT: 13.5 % (ref 11.3–14.5)
HCT VFR BLD AUTO: 45.2 % (ref 38.9–50.9)
HGB BLD-MCNC: 15 G/DL (ref 13.1–17.5)
IMM GRANULOCYTES # BLD: 0.03 10*3/MM3 (ref 0–0.03)
IMM GRANULOCYTES NFR BLD: 0.2 % (ref 0–0.6)
LYMPHOCYTES # BLD AUTO: 0.84 10*3/MM3 (ref 0.6–4.8)
LYMPHOCYTES NFR BLD AUTO: 6.9 % (ref 24–44)
MCH RBC QN AUTO: 28.3 PG (ref 27–31)
MCHC RBC AUTO-ENTMCNC: 33.2 G/DL (ref 32–36)
MCV RBC AUTO: 85.3 FL (ref 80–99)
MONOCYTES # BLD AUTO: 0.78 10*3/MM3 (ref 0–1)
MONOCYTES NFR BLD AUTO: 6.4 % (ref 0–12)
NEUTROPHILS # BLD AUTO: 10.22 10*3/MM3 (ref 1.5–8.3)
NEUTROPHILS NFR BLD AUTO: 84.4 % (ref 41–71)
PLATELET # BLD AUTO: 196 10*3/MM3 (ref 150–450)
PMV BLD AUTO: 10.9 FL (ref 6–12)
RBC # BLD AUTO: 5.3 10*6/MM3 (ref 4.2–5.76)
WBC NRBC COR # BLD: 12.12 10*3/MM3 (ref 3.5–10.8)

## 2018-09-14 PROCEDURE — 85025 COMPLETE CBC W/AUTO DIFF WBC: CPT | Performed by: NURSE PRACTITIONER

## 2018-09-20 DIAGNOSIS — E03.9 ACQUIRED HYPOTHYROIDISM: ICD-10-CM

## 2018-09-20 RX ORDER — LEVOTHYROXINE SODIUM 0.1 MG/1
TABLET ORAL
Qty: 90 TABLET | Refills: 1 | Status: SHIPPED | OUTPATIENT
Start: 2018-09-20 | End: 2018-10-30 | Stop reason: SDUPTHER

## 2018-09-28 ENCOUNTER — TELEPHONE (OUTPATIENT)
Dept: INTERNAL MEDICINE | Facility: CLINIC | Age: 63
End: 2018-09-28

## 2018-09-28 DIAGNOSIS — R79.89 ABNORMAL CBC: Primary | ICD-10-CM

## 2018-09-28 NOTE — TELEPHONE ENCOUNTER
----- Message from KIERSTEN Thorpe sent at 9/25/2018 12:54 PM EDT -----  WBC was elevated and lymphocytes are decreased on most recent CBC.  How is he feeling?  Any URI sx's or fever, chills?

## 2018-09-28 NOTE — TELEPHONE ENCOUNTER
He will come back in Monday or Tuesday for repeat Lab. I told him that if the lab is still abnormal or concerning than he will need to contact Dr. CAMACHO and run this by him and see if he would need to see him. Frederick understood and was fine with this.

## 2018-09-28 NOTE — TELEPHONE ENCOUNTER
I want to repeat this CBC again next week.  Does he have a f/u with this ENT that follows his base of tongue cancer?

## 2018-09-28 NOTE — TELEPHONE ENCOUNTER
Frederick has been notified. He said he has been feeling okay. He said he was having some teeth, Jaw, Gum discomfort like the last time he had a really bad infection from the implant. He would go to the dentist if this get worse to have it checked out. He has been having some sinus sx's like nasal congestion and runny nose. But nothing severe.     I told him this is something that you will continue to watch.

## 2018-10-04 ENCOUNTER — LAB (OUTPATIENT)
Dept: INTERNAL MEDICINE | Facility: CLINIC | Age: 63
End: 2018-10-04

## 2018-10-04 DIAGNOSIS — R79.89 ABNORMAL CBC: ICD-10-CM

## 2018-10-04 LAB
BASOPHILS # BLD AUTO: 0.07 10*3/MM3 (ref 0–0.2)
BASOPHILS NFR BLD AUTO: 1.4 % (ref 0–1)
DEPRECATED RDW RBC AUTO: 42.1 FL (ref 37–54)
EOSINOPHIL # BLD AUTO: 0.19 10*3/MM3 (ref 0–0.3)
EOSINOPHIL NFR BLD AUTO: 3.7 % (ref 0–3)
ERYTHROCYTE [DISTWIDTH] IN BLOOD BY AUTOMATED COUNT: 13.3 % (ref 11.3–14.5)
HCT VFR BLD AUTO: 45.6 % (ref 38.9–50.9)
HGB BLD-MCNC: 14.9 G/DL (ref 13.1–17.5)
IMM GRANULOCYTES # BLD: 0.02 10*3/MM3 (ref 0–0.03)
IMM GRANULOCYTES NFR BLD: 0.4 % (ref 0–0.6)
LYMPHOCYTES # BLD AUTO: 0.93 10*3/MM3 (ref 0.6–4.8)
LYMPHOCYTES NFR BLD AUTO: 18.2 % (ref 24–44)
MCH RBC QN AUTO: 28.2 PG (ref 27–31)
MCHC RBC AUTO-ENTMCNC: 32.7 G/DL (ref 32–36)
MCV RBC AUTO: 86.4 FL (ref 80–99)
MONOCYTES # BLD AUTO: 0.55 10*3/MM3 (ref 0–1)
MONOCYTES NFR BLD AUTO: 10.8 % (ref 0–12)
NEUTROPHILS # BLD AUTO: 3.34 10*3/MM3 (ref 1.5–8.3)
NEUTROPHILS NFR BLD AUTO: 65.5 % (ref 41–71)
PLATELET # BLD AUTO: 184 10*3/MM3 (ref 150–450)
PMV BLD AUTO: 11 FL (ref 6–12)
RBC # BLD AUTO: 5.28 10*6/MM3 (ref 4.2–5.76)
WBC NRBC COR # BLD: 5.1 10*3/MM3 (ref 3.5–10.8)

## 2018-10-04 PROCEDURE — 85025 COMPLETE CBC W/AUTO DIFF WBC: CPT | Performed by: NURSE PRACTITIONER

## 2018-10-05 ENCOUNTER — TELEPHONE (OUTPATIENT)
Dept: INTERNAL MEDICINE | Facility: CLINIC | Age: 63
End: 2018-10-05

## 2018-10-05 NOTE — TELEPHONE ENCOUNTER
----- Message from KIERSTEN Thorpe sent at 10/4/2018 12:51 PM EDT -----  CBC is MUCH better -- back to his baseline

## 2018-10-28 DIAGNOSIS — N40.0 BENIGN PROSTATIC HYPERPLASIA, UNSPECIFIED WHETHER LOWER URINARY TRACT SYMPTOMS PRESENT: ICD-10-CM

## 2018-10-30 DIAGNOSIS — E03.9 ACQUIRED HYPOTHYROIDISM: ICD-10-CM

## 2018-10-30 RX ORDER — TAMSULOSIN HYDROCHLORIDE 0.4 MG/1
1 CAPSULE ORAL DAILY
Qty: 90 CAPSULE | Refills: 1 | Status: SHIPPED | OUTPATIENT
Start: 2018-10-30 | End: 2019-05-05 | Stop reason: SDUPTHER

## 2018-10-30 RX ORDER — LEVOTHYROXINE SODIUM 0.1 MG/1
100 TABLET ORAL DAILY
Qty: 90 TABLET | Refills: 1 | Status: SHIPPED | OUTPATIENT
Start: 2018-10-30 | End: 2019-01-22 | Stop reason: SDUPTHER

## 2018-12-22 DIAGNOSIS — I10 BENIGN ESSENTIAL HYPERTENSION: ICD-10-CM

## 2018-12-24 RX ORDER — NIFEDIPINE 30 MG/1
30 TABLET, EXTENDED RELEASE ORAL DAILY
Qty: 90 TABLET | Refills: 1 | Status: SHIPPED | OUTPATIENT
Start: 2018-12-24 | End: 2019-08-14 | Stop reason: SDUPTHER

## 2019-01-22 DIAGNOSIS — E03.9 ACQUIRED HYPOTHYROIDISM: ICD-10-CM

## 2019-01-22 RX ORDER — LEVOTHYROXINE SODIUM 0.1 MG/1
100 TABLET ORAL DAILY
Qty: 90 TABLET | Refills: 1 | Status: SHIPPED | OUTPATIENT
Start: 2019-01-22 | End: 2019-11-21 | Stop reason: SDUPTHER

## 2019-02-05 ENCOUNTER — OFFICE VISIT (OUTPATIENT)
Dept: INTERNAL MEDICINE | Facility: CLINIC | Age: 64
End: 2019-02-05

## 2019-02-05 VITALS
SYSTOLIC BLOOD PRESSURE: 134 MMHG | BODY MASS INDEX: 21.77 KG/M2 | HEIGHT: 74 IN | DIASTOLIC BLOOD PRESSURE: 78 MMHG | WEIGHT: 169.6 LBS | HEART RATE: 70 BPM | OXYGEN SATURATION: 98 % | TEMPERATURE: 98.6 F

## 2019-02-05 DIAGNOSIS — N52.9 IMPOTENCE OF ORGANIC ORIGIN: ICD-10-CM

## 2019-02-05 DIAGNOSIS — C14.0 THROAT CANCER (HCC): ICD-10-CM

## 2019-02-05 DIAGNOSIS — Z23 NEED FOR HEPATITIS A IMMUNIZATION: ICD-10-CM

## 2019-02-05 DIAGNOSIS — K21.9 GASTROESOPHAGEAL REFLUX DISEASE, ESOPHAGITIS PRESENCE NOT SPECIFIED: ICD-10-CM

## 2019-02-05 DIAGNOSIS — N52.9 ERECTILE DYSFUNCTION, UNSPECIFIED ERECTILE DYSFUNCTION TYPE: ICD-10-CM

## 2019-02-05 DIAGNOSIS — I10 BENIGN ESSENTIAL HYPERTENSION: Primary | ICD-10-CM

## 2019-02-05 DIAGNOSIS — E03.9 HYPOTHYROIDISM, UNSPECIFIED TYPE: ICD-10-CM

## 2019-02-05 DIAGNOSIS — Z12.5 SCREENING FOR PROSTATE CANCER: ICD-10-CM

## 2019-02-05 LAB
25(OH)D3 SERPL-MCNC: 48.6 NG/ML
ALBUMIN SERPL-MCNC: 4.55 G/DL (ref 3.2–4.8)
ALBUMIN/GLOB SERPL: 2.3 G/DL (ref 1.5–2.5)
ALP SERPL-CCNC: 64 U/L (ref 25–100)
ALT SERPL W P-5'-P-CCNC: 12 U/L (ref 7–40)
ANION GAP SERPL CALCULATED.3IONS-SCNC: 8 MMOL/L (ref 3–11)
ARTICHOKE IGE QN: 121 MG/DL (ref 0–130)
AST SERPL-CCNC: 23 U/L (ref 0–33)
BASOPHILS # BLD AUTO: 0.06 10*3/MM3 (ref 0–0.2)
BASOPHILS NFR BLD AUTO: 0.9 % (ref 0–1)
BILIRUB SERPL-MCNC: 0.8 MG/DL (ref 0.3–1.2)
BUN BLD-MCNC: 14 MG/DL (ref 9–23)
BUN/CREAT SERPL: 15.1 (ref 7–25)
CALCIUM SPEC-SCNC: 9.2 MG/DL (ref 8.7–10.4)
CHLORIDE SERPL-SCNC: 104 MMOL/L (ref 99–109)
CHOLEST SERPL-MCNC: 185 MG/DL (ref 0–200)
CO2 SERPL-SCNC: 30 MMOL/L (ref 20–31)
CREAT BLD-MCNC: 0.93 MG/DL (ref 0.6–1.3)
DEPRECATED RDW RBC AUTO: 42.5 FL (ref 37–54)
EOSINOPHIL # BLD AUTO: 0.1 10*3/MM3 (ref 0–0.3)
EOSINOPHIL NFR BLD AUTO: 1.5 % (ref 0–3)
ERYTHROCYTE [DISTWIDTH] IN BLOOD BY AUTOMATED COUNT: 13.4 % (ref 11.3–14.5)
GFR SERPL CREATININE-BSD FRML MDRD: 83 ML/MIN/1.73
GLOBULIN UR ELPH-MCNC: 2 GM/DL
GLUCOSE BLD-MCNC: 76 MG/DL (ref 70–100)
HCT VFR BLD AUTO: 45.3 % (ref 38.9–50.9)
HDLC SERPL-MCNC: 61 MG/DL (ref 40–60)
HGB BLD-MCNC: 14.6 G/DL (ref 13.1–17.5)
IMM GRANULOCYTES # BLD AUTO: 0.02 10*3/MM3 (ref 0–0.03)
IMM GRANULOCYTES NFR BLD AUTO: 0.3 % (ref 0–0.6)
LYMPHOCYTES # BLD AUTO: 1.21 10*3/MM3 (ref 0.6–4.8)
LYMPHOCYTES NFR BLD AUTO: 18.6 % (ref 24–44)
MCH RBC QN AUTO: 27.9 PG (ref 27–31)
MCHC RBC AUTO-ENTMCNC: 32.2 G/DL (ref 32–36)
MCV RBC AUTO: 86.5 FL (ref 80–99)
MONOCYTES # BLD AUTO: 0.8 10*3/MM3 (ref 0–1)
MONOCYTES NFR BLD AUTO: 12.3 % (ref 0–12)
NEUTROPHILS # BLD AUTO: 4.34 10*3/MM3 (ref 1.5–8.3)
NEUTROPHILS NFR BLD AUTO: 66.7 % (ref 41–71)
PLATELET # BLD AUTO: 188 10*3/MM3 (ref 150–450)
PMV BLD AUTO: 10.9 FL (ref 6–12)
POTASSIUM BLD-SCNC: 3.8 MMOL/L (ref 3.5–5.5)
PROT SERPL-MCNC: 6.5 G/DL (ref 5.7–8.2)
PSA SERPL-MCNC: 0.76 NG/ML (ref 0–4)
RBC # BLD AUTO: 5.24 10*6/MM3 (ref 4.2–5.76)
SODIUM BLD-SCNC: 142 MMOL/L (ref 132–146)
TRIGL SERPL-MCNC: 75 MG/DL (ref 0–150)
TSH SERPL DL<=0.05 MIU/L-ACNC: 2.22 MIU/ML (ref 0.35–5.35)
VIT B12 BLD-MCNC: 540 PG/ML (ref 211–911)
WBC NRBC COR # BLD: 6.51 10*3/MM3 (ref 3.5–10.8)

## 2019-02-05 PROCEDURE — 82306 VITAMIN D 25 HYDROXY: CPT | Performed by: NURSE PRACTITIONER

## 2019-02-05 PROCEDURE — 84443 ASSAY THYROID STIM HORMONE: CPT | Performed by: NURSE PRACTITIONER

## 2019-02-05 PROCEDURE — 90632 HEPA VACCINE ADULT IM: CPT | Performed by: NURSE PRACTITIONER

## 2019-02-05 PROCEDURE — 99214 OFFICE O/P EST MOD 30 MIN: CPT | Performed by: NURSE PRACTITIONER

## 2019-02-05 PROCEDURE — 90471 IMMUNIZATION ADMIN: CPT | Performed by: NURSE PRACTITIONER

## 2019-02-05 PROCEDURE — 82607 VITAMIN B-12: CPT | Performed by: NURSE PRACTITIONER

## 2019-02-05 PROCEDURE — G0103 PSA SCREENING: HCPCS | Performed by: NURSE PRACTITIONER

## 2019-02-05 PROCEDURE — 85025 COMPLETE CBC W/AUTO DIFF WBC: CPT | Performed by: NURSE PRACTITIONER

## 2019-02-05 PROCEDURE — 80061 LIPID PANEL: CPT | Performed by: NURSE PRACTITIONER

## 2019-02-05 PROCEDURE — 80053 COMPREHEN METABOLIC PANEL: CPT | Performed by: NURSE PRACTITIONER

## 2019-02-05 RX ORDER — SILDENAFIL CITRATE 20 MG/1
TABLET ORAL
Qty: 50 TABLET | Refills: 2 | Status: SHIPPED | OUTPATIENT
Start: 2019-02-05 | End: 2022-03-08 | Stop reason: SDUPTHER

## 2019-02-05 NOTE — PROGRESS NOTES
Subjective   Boone Riley is a 63 y.o. male    Chief Complaint   Patient presents with   • Hypothyroidism     follow up   • Hypertension     History of Present Illness     BPH - Seeing Urology (Mario).  Has him on the Flomax.  Takes PRN meds for ED.  Was given 20mg Sildenafil that he takes PRN.       HTN is well controlled on Procardia XL 30mg; did not tolerate an ACE or an ARB.  Checks his BP intermittently at home.       Hypothyroid - currently taking 100mcg of Synthroid      GERD - chronic and stable on Omeprazole 40mg daily; had recent scope and it was increased to 40mg       H/O throat (base of tongue) cancer.  Dx'd in 2008.  Followed by Dr. CAMACHO.  All recent reports have been good. Seeing him on a yearly basis now.        Flu shot - 9/2018  Tdap - 2014  PSA - per urology,   colon - within the past few months, requested (boom)       The following portions of the patient's history were reviewed and updated as appropriate: allergies, current medications, past family history, past medical history, past social history, past surgical history and problem list.    Current Outpatient Medications:   •  levothyroxine (SYNTHROID, LEVOTHROID) 100 MCG tablet, TAKE 1 TABLET BY MOUTH  DAILY, Disp: 90 tablet, Rfl: 1  •  NIFEdipine XL (PROCARDIA XL) 30 MG 24 hr tablet, TAKE 1 TABLET BY MOUTH  DAILY, Disp: 90 tablet, Rfl: 1  •  omeprazole (priLOSEC) 20 MG capsule, Take 1 capsule by mouth Daily., Disp: 90 capsule, Rfl: 1  •  sildenafil (REVATIO) 20 MG tablet, 2-5 PO as needed for sexual activity, Disp: 50 tablet, Rfl: 2  •  tamsulosin (FLOMAX) 0.4 MG capsule 24 hr capsule, TAKE 1 CAPSULE BY MOUTH  DAILY, Disp: 90 capsule, Rfl: 1     Review of Systems   Constitutional: Negative for chills, fatigue and fever.   Respiratory: Negative for cough, chest tightness and shortness of breath.    Cardiovascular: Negative for chest pain.   Gastrointestinal: Negative for abdominal pain, diarrhea, nausea and vomiting.   Endocrine:  "Negative for cold intolerance and heat intolerance.   Musculoskeletal: Negative for arthralgias.   Neurological: Negative for dizziness.       Objective   Physical Exam   Constitutional: He is oriented to person, place, and time. He appears well-developed and well-nourished.   HENT:   Head: Normocephalic and atraumatic.   Eyes: Conjunctivae and EOM are normal. Pupils are equal, round, and reactive to light.   Neck: Normal range of motion.   Cardiovascular: Normal rate, regular rhythm and normal heart sounds.   Pulmonary/Chest: Effort normal and breath sounds normal.   Abdominal: Soft. Bowel sounds are normal.   Musculoskeletal: Normal range of motion.   Neurological: He is alert and oriented to person, place, and time. He has normal reflexes.   Skin: Skin is warm and dry.   Psychiatric: He has a normal mood and affect. His behavior is normal. Judgment and thought content normal.     Vitals:    02/05/19 1248   BP: 134/78   BP Location: Right arm   Pulse: 70   Temp: 98.6 °F (37 °C)   TempSrc: Temporal   SpO2: 98%   Weight: 76.9 kg (169 lb 9.6 oz)   Height: 188 cm (74.02\")         Assessment/Plan   Boone was seen today for hypothyroidism and hypertension.    Diagnoses and all orders for this visit:    Benign essential hypertension  -     CBC & Differential  -     Comprehensive Metabolic Panel  -     Lipid Panel  -     Vitamin D 25 Hydroxy  -     Vitamin B12  -     TSH    Erectile dysfunction, unspecified erectile dysfunction type  -     CBC & Differential  -     Comprehensive Metabolic Panel  -     Lipid Panel  -     Vitamin D 25 Hydroxy  -     Vitamin B12  -     TSH    Gastroesophageal reflux disease, esophagitis presence not specified  -     CBC & Differential  -     Comprehensive Metabolic Panel  -     Lipid Panel  -     Vitamin D 25 Hydroxy  -     Vitamin B12  -     TSH    Hypothyroidism, unspecified type  -     CBC & Differential  -     Comprehensive Metabolic Panel  -     Lipid Panel  -     Vitamin D 25 " Hydroxy  -     Vitamin B12  -     TSH    Throat cancer (CMS/HCC)  -     CBC & Differential  -     Comprehensive Metabolic Panel  -     Lipid Panel  -     Vitamin D 25 Hydroxy  -     Vitamin B12  -     TSH    Need for hepatitis A immunization  -     Hepatitis A Vaccine Adult IM    Impotence of organic origin  -     sildenafil (REVATIO) 20 MG tablet; 2-5 PO as needed for sexual activity  -     CBC & Differential  -     Comprehensive Metabolic Panel  -     Lipid Panel  -     Vitamin D 25 Hydroxy  -     Vitamin B12  -     TSH    Hep A updated  Labs sent  Sildenafil refilled  Return in about 6 months (around 8/5/2019) for Annual.

## 2019-02-13 ENCOUNTER — TELEPHONE (OUTPATIENT)
Dept: INTERNAL MEDICINE | Facility: CLINIC | Age: 64
End: 2019-02-13

## 2019-02-13 NOTE — TELEPHONE ENCOUNTER
I have left a message with Stephy who does the medical records for [facility] and asked that she fax the most recent colon report of this patient that would have been done by Dr. Mosqueda before he switched groups. I also left our office # if there are any issues and she needs to call me back.

## 2019-02-13 NOTE — TELEPHONE ENCOUNTER
----- Message from KIERSTEN Thorpe sent at 2/5/2019  1:09 PM EST -----  Can you request most recent colonoscopy from [facility]?  Pt had it done before they became [facility]

## 2019-03-07 ENCOUNTER — TELEPHONE (OUTPATIENT)
Dept: INTERNAL MEDICINE | Facility: CLINIC | Age: 64
End: 2019-03-07

## 2019-03-07 NOTE — TELEPHONE ENCOUNTER
----- Message from Baldomero Mosqueda MD sent at 3/7/2019 11:02 AM EST -----  Regarding: RE: Recent Colonscopy  Copy getting scanned in epic. It was normal 10/1/18.   ----- Message -----  From: Josey Ramos MA  Sent: 3/6/2019  10:53 AM  To: Baldomero Mosqueda MD  Subject: Recent Colonscopy                                Primary care provider KIERSTEN Ha is requesting the most recent colonoscopy report on [facility] patient Boone Riley ( 1955). This would have been done prior to Dr. Mosqueda moving over to [facility].   If you could please send this over to our office it would be greatly appreciated.     Thank you,    Josey Ramos, [facility]    Office #: [phone]  Fax #: [phone]

## 2019-05-05 DIAGNOSIS — N40.0 BENIGN PROSTATIC HYPERPLASIA, UNSPECIFIED WHETHER LOWER URINARY TRACT SYMPTOMS PRESENT: ICD-10-CM

## 2019-05-06 RX ORDER — TAMSULOSIN HYDROCHLORIDE 0.4 MG/1
1 CAPSULE ORAL DAILY
Qty: 90 CAPSULE | Refills: 1 | Status: SHIPPED | OUTPATIENT
Start: 2019-05-06 | End: 2020-01-13

## 2019-08-14 DIAGNOSIS — I10 BENIGN ESSENTIAL HYPERTENSION: ICD-10-CM

## 2019-08-14 RX ORDER — NIFEDIPINE 30 MG/1
30 TABLET, EXTENDED RELEASE ORAL DAILY
Qty: 90 TABLET | Refills: 1 | Status: SHIPPED | OUTPATIENT
Start: 2019-08-14 | End: 2020-03-18

## 2019-08-16 ENCOUNTER — OFFICE VISIT (OUTPATIENT)
Dept: INTERNAL MEDICINE | Facility: CLINIC | Age: 64
End: 2019-08-16

## 2019-08-16 VITALS
HEART RATE: 74 BPM | SYSTOLIC BLOOD PRESSURE: 132 MMHG | BODY MASS INDEX: 20.61 KG/M2 | DIASTOLIC BLOOD PRESSURE: 80 MMHG | TEMPERATURE: 97.8 F | HEIGHT: 74 IN | OXYGEN SATURATION: 98 % | WEIGHT: 160.6 LBS | RESPIRATION RATE: 16 BRPM

## 2019-08-16 DIAGNOSIS — K21.9 GASTROESOPHAGEAL REFLUX DISEASE, ESOPHAGITIS PRESENCE NOT SPECIFIED: ICD-10-CM

## 2019-08-16 DIAGNOSIS — E03.9 HYPOTHYROIDISM, UNSPECIFIED TYPE: ICD-10-CM

## 2019-08-16 DIAGNOSIS — N40.0 BENIGN PROSTATIC HYPERPLASIA, UNSPECIFIED WHETHER LOWER URINARY TRACT SYMPTOMS PRESENT: ICD-10-CM

## 2019-08-16 DIAGNOSIS — C14.0 THROAT CANCER (HCC): ICD-10-CM

## 2019-08-16 DIAGNOSIS — I10 BENIGN ESSENTIAL HYPERTENSION: Primary | ICD-10-CM

## 2019-08-16 DIAGNOSIS — Z23 NEED FOR HEPATITIS A IMMUNIZATION: ICD-10-CM

## 2019-08-16 LAB
ALBUMIN SERPL-MCNC: 4.8 G/DL (ref 3.5–5.2)
ALBUMIN/GLOB SERPL: 2.3 G/DL
ALP SERPL-CCNC: 66 U/L (ref 39–117)
ALT SERPL W P-5'-P-CCNC: 11 U/L (ref 1–41)
ANION GAP SERPL CALCULATED.3IONS-SCNC: 13.3 MMOL/L (ref 5–15)
AST SERPL-CCNC: 18 U/L (ref 1–40)
BASOPHILS # BLD AUTO: 0.06 10*3/MM3 (ref 0–0.2)
BASOPHILS NFR BLD AUTO: 1.1 % (ref 0–1.5)
BILIRUB SERPL-MCNC: 0.4 MG/DL (ref 0.2–1.2)
BUN BLD-MCNC: 19 MG/DL (ref 8–23)
BUN/CREAT SERPL: 20.9 (ref 7–25)
CALCIUM SPEC-SCNC: 9.4 MG/DL (ref 8.6–10.5)
CHLORIDE SERPL-SCNC: 97 MMOL/L (ref 98–107)
CO2 SERPL-SCNC: 26.7 MMOL/L (ref 22–29)
CREAT BLD-MCNC: 0.91 MG/DL (ref 0.76–1.27)
DEPRECATED RDW RBC AUTO: 44.4 FL (ref 37–54)
EOSINOPHIL # BLD AUTO: 0.22 10*3/MM3 (ref 0–0.4)
EOSINOPHIL NFR BLD AUTO: 3.9 % (ref 0.3–6.2)
ERYTHROCYTE [DISTWIDTH] IN BLOOD BY AUTOMATED COUNT: 13.6 % (ref 12.3–15.4)
GFR SERPL CREATININE-BSD FRML MDRD: 85 ML/MIN/1.73
GLOBULIN UR ELPH-MCNC: 2.1 GM/DL
GLUCOSE BLD-MCNC: 124 MG/DL (ref 65–99)
HCT VFR BLD AUTO: 44.7 % (ref 37.5–51)
HGB BLD-MCNC: 14.3 G/DL (ref 13–17.7)
IMM GRANULOCYTES # BLD AUTO: 0.04 10*3/MM3 (ref 0–0.05)
IMM GRANULOCYTES NFR BLD AUTO: 0.7 % (ref 0–0.5)
LYMPHOCYTES # BLD AUTO: 0.86 10*3/MM3 (ref 0.7–3.1)
LYMPHOCYTES NFR BLD AUTO: 15.3 % (ref 19.6–45.3)
MCH RBC QN AUTO: 28.5 PG (ref 26.6–33)
MCHC RBC AUTO-ENTMCNC: 32 G/DL (ref 31.5–35.7)
MCV RBC AUTO: 89 FL (ref 79–97)
MONOCYTES # BLD AUTO: 0.54 10*3/MM3 (ref 0.1–0.9)
MONOCYTES NFR BLD AUTO: 9.6 % (ref 5–12)
NEUTROPHILS # BLD AUTO: 3.89 10*3/MM3 (ref 1.7–7)
NEUTROPHILS NFR BLD AUTO: 69.4 % (ref 42.7–76)
NRBC BLD AUTO-RTO: 0 /100 WBC (ref 0–0.2)
PLATELET # BLD AUTO: 195 10*3/MM3 (ref 140–450)
PMV BLD AUTO: 11.8 FL (ref 6–12)
POTASSIUM BLD-SCNC: 4 MMOL/L (ref 3.5–5.2)
PROT SERPL-MCNC: 6.9 G/DL (ref 6–8.5)
RBC # BLD AUTO: 5.02 10*6/MM3 (ref 4.14–5.8)
SODIUM BLD-SCNC: 137 MMOL/L (ref 136–145)
TSH SERPL DL<=0.05 MIU/L-ACNC: 1.35 MIU/ML (ref 0.27–4.2)
VIT B12 BLD-MCNC: 307 PG/ML (ref 211–946)
WBC NRBC COR # BLD: 5.61 10*3/MM3 (ref 3.4–10.8)

## 2019-08-16 PROCEDURE — 85025 COMPLETE CBC W/AUTO DIFF WBC: CPT | Performed by: NURSE PRACTITIONER

## 2019-08-16 PROCEDURE — 82607 VITAMIN B-12: CPT | Performed by: NURSE PRACTITIONER

## 2019-08-16 PROCEDURE — 90471 IMMUNIZATION ADMIN: CPT | Performed by: NURSE PRACTITIONER

## 2019-08-16 PROCEDURE — 82306 VITAMIN D 25 HYDROXY: CPT | Performed by: NURSE PRACTITIONER

## 2019-08-16 PROCEDURE — 80053 COMPREHEN METABOLIC PANEL: CPT | Performed by: NURSE PRACTITIONER

## 2019-08-16 PROCEDURE — 84443 ASSAY THYROID STIM HORMONE: CPT | Performed by: NURSE PRACTITIONER

## 2019-08-16 PROCEDURE — 99214 OFFICE O/P EST MOD 30 MIN: CPT | Performed by: NURSE PRACTITIONER

## 2019-08-16 PROCEDURE — 90632 HEPA VACCINE ADULT IM: CPT | Performed by: NURSE PRACTITIONER

## 2019-08-16 NOTE — PROGRESS NOTES
Subjective   Boone Riley is a 63 y.o. male    Chief Complaint   Patient presents with   • 6 month follow up   • Hypothyroidism   • Hypertension   • Benign Prostatic Hypertrophy     History of Present Illness     Here for f/u     BPH - Seeing Urology (Mario).  Has him on the Flomax.  Takes PRN meds for ED.  Was given 20mg Sildenafil that he takes PRN.  Does well with this regimen w/o SE     HTN is well controlled on Procardia XL 30mg; did not tolerate an ACE or an ARB.  Checks his BP intermittently at home.   There have been times that he has checked it at home and it has been low.  No AE's      Hypothyroid - currently taking 100mcg of Synthroid - due for labs      GERD - chronic and stable on Omeprazole 20mg daily; sx's well controlled      H/O throat (base of tongue) cancer.  Dx'd in 2008.  Followed by Dr. CAMACHO.  All recent reports have been good. Seeing him on a yearly basis now.        Flu shot - 9/2018  Tdap - 2014  PSA - 2/2018  colon - 3/2019  Hep A - #2 today    The following portions of the patient's history were reviewed and updated as appropriate: allergies, current medications, past family history, past medical history, past social history, past surgical history and problem list.    Current Outpatient Medications:   •  levothyroxine (SYNTHROID, LEVOTHROID) 100 MCG tablet, TAKE 1 TABLET BY MOUTH  DAILY, Disp: 90 tablet, Rfl: 1  •  NIFEdipine XL (PROCARDIA XL) 30 MG 24 hr tablet, TAKE 1 TABLET BY MOUTH  DAILY, Disp: 90 tablet, Rfl: 1  •  omeprazole (priLOSEC) 20 MG capsule, Take 1 capsule by mouth Daily., Disp: 90 capsule, Rfl: 1  •  sildenafil (REVATIO) 20 MG tablet, 2-5 PO as needed for sexual activity, Disp: 50 tablet, Rfl: 2  •  tamsulosin (FLOMAX) 0.4 MG capsule 24 hr capsule, TAKE 1 CAPSULE BY MOUTH  DAILY, Disp: 90 capsule, Rfl: 1     Review of Systems   Constitutional: Negative for chills, fatigue and fever.   Respiratory: Negative for cough, chest tightness and shortness of breath.   "  Cardiovascular: Negative for chest pain.   Gastrointestinal: Negative for abdominal pain, diarrhea, nausea and vomiting.   Endocrine: Negative for cold intolerance and heat intolerance.   Musculoskeletal: Negative for arthralgias.   Neurological: Negative for dizziness.       Objective   Physical Exam   Constitutional: He is oriented to person, place, and time. He appears well-developed and well-nourished.   HENT:   Head: Normocephalic and atraumatic.   Eyes: Conjunctivae and EOM are normal. Pupils are equal, round, and reactive to light.   Neck: Normal range of motion.   Cardiovascular: Normal rate, regular rhythm and normal heart sounds.   Pulmonary/Chest: Effort normal and breath sounds normal.   Abdominal: Soft. Bowel sounds are normal.   Musculoskeletal: Normal range of motion.   Neurological: He is alert and oriented to person, place, and time. He has normal reflexes.   Skin: Skin is warm and dry.   Psychiatric: He has a normal mood and affect. His behavior is normal. Judgment and thought content normal.     Vitals:    08/16/19 1351   BP: 132/80   Pulse: 74   Resp: 16   Temp: 97.8 °F (36.6 °C)   TempSrc: Temporal   SpO2: 98%   Weight: 72.8 kg (160 lb 9.6 oz)   Height: 188 cm (74.02\")         Assessment/Plan   Boone was seen today for 6 month follow up, hypothyroidism, hypertension and benign prostatic hypertrophy.    Diagnoses and all orders for this visit:    Benign essential hypertension  -     CBC & Differential  -     Comprehensive Metabolic Panel  -     TSH  -     Vitamin B12  -     Vitamin D 25 Hydroxy    Hypothyroidism, unspecified type  -     CBC & Differential  -     Comprehensive Metabolic Panel  -     TSH  -     Vitamin B12  -     Vitamin D 25 Hydroxy    Gastroesophageal reflux disease, esophagitis presence not specified  -     CBC & Differential  -     Comprehensive Metabolic Panel  -     TSH  -     Vitamin B12  -     Vitamin D 25 Hydroxy    Throat cancer (CMS/HCC)  -     CBC & Differential  -  "    Comprehensive Metabolic Panel  -     TSH  -     Vitamin B12  -     Vitamin D 25 Hydroxy    Benign prostatic hyperplasia, unspecified whether lower urinary tract symptoms present  -     CBC & Differential  -     Comprehensive Metabolic Panel  -     TSH  -     Vitamin B12  -     Vitamin D 25 Hydroxy    Need for hepatitis A immunization  -     Hepatitis A Vaccine Adult IM      Hep A updated  Labs sent  No refills needed  No changes made  Monitor BP and call if consistently <110/70  Return in about 6 months (around 2/16/2020) for Annual.

## 2019-08-17 LAB — 25(OH)D3 SERPL-MCNC: 43 NG/ML (ref 30–100)

## 2019-08-28 ENCOUNTER — TELEPHONE (OUTPATIENT)
Dept: INTERNAL MEDICINE | Facility: CLINIC | Age: 64
End: 2019-08-28

## 2019-08-28 NOTE — TELEPHONE ENCOUNTER
----- Message from KIERSTEN Thorpe sent at 8/23/2019 12:25 PM EDT -----  BG was elevated at 124.  I did not send an A1C that day.  Can he stop by for this?    All other labs looked great.

## 2019-08-28 NOTE — TELEPHONE ENCOUNTER
Frederick has been notified. He will try and stop by today or tomorrow for the A1C. He also said that he was not fasting that day either.

## 2019-11-21 DIAGNOSIS — E03.9 ACQUIRED HYPOTHYROIDISM: ICD-10-CM

## 2019-11-21 RX ORDER — LEVOTHYROXINE SODIUM 0.1 MG/1
100 TABLET ORAL DAILY
Qty: 90 TABLET | Refills: 0 | Status: SHIPPED | OUTPATIENT
Start: 2019-11-21 | End: 2020-03-18

## 2020-01-11 DIAGNOSIS — N40.0 BENIGN PROSTATIC HYPERPLASIA, UNSPECIFIED WHETHER LOWER URINARY TRACT SYMPTOMS PRESENT: ICD-10-CM

## 2020-01-13 RX ORDER — TAMSULOSIN HYDROCHLORIDE 0.4 MG/1
1 CAPSULE ORAL DAILY
Qty: 90 CAPSULE | Refills: 0 | Status: SHIPPED | OUTPATIENT
Start: 2020-01-13 | End: 2020-03-18

## 2020-01-14 ENCOUNTER — TELEPHONE (OUTPATIENT)
Dept: INTERNAL MEDICINE | Facility: CLINIC | Age: 65
End: 2020-01-14

## 2020-01-14 DIAGNOSIS — K21.9 GASTROESOPHAGEAL REFLUX DISEASE, ESOPHAGITIS PRESENCE NOT SPECIFIED: ICD-10-CM

## 2020-01-14 RX ORDER — OMEPRAZOLE 20 MG/1
20 CAPSULE, DELAYED RELEASE ORAL DAILY
Qty: 90 CAPSULE | Refills: 1 | Status: SHIPPED | OUTPATIENT
Start: 2020-01-14 | End: 2020-08-27

## 2020-01-14 NOTE — TELEPHONE ENCOUNTER
Patient requesting refill on omeprazole (priLOSEC) 20 MG capsule 20 to be sent to the [facility], please advise.

## 2020-02-27 ENCOUNTER — OFFICE VISIT (OUTPATIENT)
Dept: INTERNAL MEDICINE | Facility: CLINIC | Age: 65
End: 2020-02-27

## 2020-02-27 VITALS
WEIGHT: 158 LBS | TEMPERATURE: 98.4 F | SYSTOLIC BLOOD PRESSURE: 130 MMHG | OXYGEN SATURATION: 98 % | BODY MASS INDEX: 20.28 KG/M2 | HEART RATE: 75 BPM | DIASTOLIC BLOOD PRESSURE: 84 MMHG | HEIGHT: 74 IN

## 2020-02-27 DIAGNOSIS — K21.9 GASTROESOPHAGEAL REFLUX DISEASE, ESOPHAGITIS PRESENCE NOT SPECIFIED: ICD-10-CM

## 2020-02-27 DIAGNOSIS — Z00.00 ANNUAL PHYSICAL EXAM: Primary | ICD-10-CM

## 2020-02-27 DIAGNOSIS — C14.0 THROAT CANCER (HCC): ICD-10-CM

## 2020-02-27 DIAGNOSIS — N40.0 BENIGN PROSTATIC HYPERPLASIA, UNSPECIFIED WHETHER LOWER URINARY TRACT SYMPTOMS PRESENT: ICD-10-CM

## 2020-02-27 DIAGNOSIS — I10 BENIGN ESSENTIAL HYPERTENSION: ICD-10-CM

## 2020-02-27 DIAGNOSIS — Z23 NEED FOR PNEUMOCOCCAL VACCINATION: ICD-10-CM

## 2020-02-27 DIAGNOSIS — E03.9 HYPOTHYROIDISM, UNSPECIFIED TYPE: ICD-10-CM

## 2020-02-27 LAB
BILIRUB BLD-MCNC: NEGATIVE MG/DL
CLARITY, POC: CLEAR
COLOR UR: YELLOW
GLUCOSE UR STRIP-MCNC: NEGATIVE MG/DL
KETONES UR QL: NEGATIVE
LEUKOCYTE EST, POC: NEGATIVE
NITRITE UR-MCNC: NEGATIVE MG/ML
PH UR: 6 [PH] (ref 5–8)
PROT UR STRIP-MCNC: NEGATIVE MG/DL
RBC # UR STRIP: NEGATIVE /UL
SP GR UR: 1.02 (ref 1–1.03)
UROBILINOGEN UR QL: NORMAL

## 2020-02-27 PROCEDURE — 90732 PPSV23 VACC 2 YRS+ SUBQ/IM: CPT | Performed by: NURSE PRACTITIONER

## 2020-02-27 PROCEDURE — 81003 URINALYSIS AUTO W/O SCOPE: CPT | Performed by: NURSE PRACTITIONER

## 2020-02-27 PROCEDURE — 99396 PREV VISIT EST AGE 40-64: CPT | Performed by: NURSE PRACTITIONER

## 2020-02-27 PROCEDURE — 90471 IMMUNIZATION ADMIN: CPT | Performed by: NURSE PRACTITIONER

## 2020-02-27 NOTE — PROGRESS NOTES
Subjective   Boone Riley is a 64 y.o. male    Chief Complaint   Patient presents with   • Annual Exam     History of Present Illness     Here for PE    BPH - Seeing Urology ([facility]).  Has him on the Flomax.  Takes PRN meds for ED.  Was given 20mg Sildenafil that he takes PRN/rarely.  Does well with this regimen w/o SE.       HTN is well controlled on Procardia XL 30mg; did not tolerate an ACE or an ARB.  Checks his BP intermittently at home.    BP is generally within normal limits.  States that he does have low readings occasionally.  He just hold his blood pressure medicine for that day.      Hypothyroid - currently taking 100mcg of Synthroid.       GERD - chronic and stable on Omeprazole 20mg daily; sx's well controlled      H/O throat (base of tongue) cancer.  Dx'd in 2008.  Followed by Dr. CAMACHO.  All recent reports have been good. Seeing him on a yearly basis now.        Flu shot - 10/2019 @ work  Tdap - 2014  PSA - 2/2018  colon - 3/2019  Hep A - 2/2019, 8/2019     Diet - generally healthy    Exercise - walks a great deal at work, but no organized exercise    Social History     Tobacco Use   • Smoking status: Former Smoker   • Smokeless tobacco: Never Used   Substance Use Topics   • Alcohol use: No   • Drug use: No         The following portions of the patient's history were reviewed and updated as appropriate: allergies, current medications, past family history, past medical history, past social history, past surgical history and problem list.    Current Outpatient Medications:   •  levothyroxine (SYNTHROID, LEVOTHROID) 100 MCG tablet, TAKE 1 TABLET BY MOUTH  DAILY, Disp: 90 tablet, Rfl: 0  •  NIFEdipine XL (PROCARDIA XL) 30 MG 24 hr tablet, TAKE 1 TABLET BY MOUTH  DAILY, Disp: 90 tablet, Rfl: 1  •  omeprazole (priLOSEC) 20 MG capsule, Take 1 capsule by mouth Daily., Disp: 90 capsule, Rfl: 1  •  sildenafil (REVATIO) 20 MG tablet, 2-5 PO as needed for sexual activity, Disp: 50 tablet, Rfl: 2  •  tamsulosin  (FLOMAX) 0.4 MG capsule 24 hr capsule, TAKE 1 CAPSULE BY MOUTH  DAILY, Disp: 90 capsule, Rfl: 0     Review of Systems   Constitutional: Negative for appetite change, chills, fatigue, fever and unexpected weight change.   HENT: Negative for congestion, ear pain, nosebleeds, rhinorrhea and tinnitus.    Eyes: Negative for pain.   Respiratory: Negative for cough, chest tightness and shortness of breath.    Cardiovascular: Negative for chest pain, palpitations and leg swelling.   Gastrointestinal: Negative for abdominal distention, abdominal pain, blood in stool, constipation, diarrhea, nausea and vomiting.   Endocrine: Negative for cold intolerance, heat intolerance, polydipsia, polyphagia and polyuria.   Genitourinary: Negative for dysuria, flank pain, frequency, hematuria and urgency.   Musculoskeletal: Negative for arthralgias, back pain, gait problem, joint swelling, myalgias and neck pain.   Skin: Negative for color change, pallor, rash and wound.   Allergic/Immunologic: Negative for environmental allergies and food allergies.   Neurological: Negative for dizziness, syncope, weakness, light-headedness, numbness and headaches.   Hematological: Negative for adenopathy. Does not bruise/bleed easily.   Psychiatric/Behavioral: Negative for behavioral problems and suicidal ideas. The patient is not nervous/anxious.        Objective   Physical Exam   Constitutional: He is oriented to person, place, and time. Vital signs are normal. He appears well-developed and well-nourished.   HENT:   Head: Normocephalic and atraumatic.   Right Ear: External ear normal.   Left Ear: External ear normal.   Nose: Nose normal.   Mouth/Throat: Oropharynx is clear and moist.   Eyes: Pupils are equal, round, and reactive to light. Conjunctivae, EOM and lids are normal.   Neck: Normal range of motion. Neck supple. Carotid bruit is not present.   Cardiovascular: Normal rate, regular rhythm, normal heart sounds and intact distal pulses.  "  Pulmonary/Chest: Effort normal and breath sounds normal.   Abdominal: Soft. Bowel sounds are normal. There is no hepatosplenomegaly, splenomegaly or hepatomegaly. No hernia.   Musculoskeletal: Normal range of motion.   Lymphadenopathy:     He has no cervical adenopathy.   Neurological: He is alert and oriented to person, place, and time. He has normal reflexes.   Skin: Skin is warm, dry and intact.   Psychiatric: He has a normal mood and affect. His behavior is normal. Judgment and thought content normal.     Vitals:    02/27/20 0855   BP: 130/84   Pulse: 75   Temp: 98.4 °F (36.9 °C)   SpO2: 98%   Weight: 71.7 kg (158 lb)   Height: 188 cm (74\")         Assessment/Plan   Boone was seen today for annual exam.    Diagnoses and all orders for this visit:    Annual physical exam  -     POCT urinalysis dipstick, automated  -     CBC & Differential; Future  -     Comprehensive Metabolic Panel; Future  -     Lipid Panel; Future  -     TSH; Future  -     Vitamin B12; Future  -     Vitamin D 25 Hydroxy; Future    Need for pneumococcal vaccination  -     Pneumococcal Polysaccharide Vaccine 23-Valent Greater Than or Equal To 3yo Subcutaneous / IM  -     CBC & Differential; Future  -     Comprehensive Metabolic Panel; Future  -     Lipid Panel; Future  -     TSH; Future  -     Vitamin B12; Future  -     Vitamin D 25 Hydroxy; Future    Benign essential hypertension  -     CBC & Differential; Future  -     Comprehensive Metabolic Panel; Future  -     Lipid Panel; Future  -     TSH; Future  -     Vitamin B12; Future  -     Vitamin D 25 Hydroxy; Future    Throat cancer (CMS/HCC)  -     CBC & Differential; Future  -     Comprehensive Metabolic Panel; Future  -     Lipid Panel; Future  -     TSH; Future  -     Vitamin B12; Future  -     Vitamin D 25 Hydroxy; Future    Gastroesophageal reflux disease, esophagitis presence not specified  -     CBC & Differential; Future  -     Comprehensive Metabolic Panel; Future  -     Lipid " Panel; Future  -     TSH; Future  -     Vitamin B12; Future  -     Vitamin D 25 Hydroxy; Future    Hypothyroidism, unspecified type  -     CBC & Differential; Future  -     Comprehensive Metabolic Panel; Future  -     Lipid Panel; Future  -     TSH; Future  -     Vitamin B12; Future  -     Vitamin D 25 Hydroxy; Future    Benign prostatic hyperplasia, unspecified whether lower urinary tract symptoms present  -     CBC & Differential; Future  -     Comprehensive Metabolic Panel; Future  -     Lipid Panel; Future  -     TSH; Future  -     Vitamin B12; Future  -     Vitamin D 25 Hydroxy; Future      Pt is not fasting, so he will RTC for labs  No med changes  Pneumovax updated  Counseling - diet and exercise  Return in about 6 months (around 8/27/2020).

## 2020-02-28 ENCOUNTER — LAB (OUTPATIENT)
Dept: INTERNAL MEDICINE | Facility: CLINIC | Age: 65
End: 2020-02-28

## 2020-02-28 DIAGNOSIS — Z00.00 ANNUAL PHYSICAL EXAM: ICD-10-CM

## 2020-02-28 DIAGNOSIS — K21.9 GASTROESOPHAGEAL REFLUX DISEASE, ESOPHAGITIS PRESENCE NOT SPECIFIED: ICD-10-CM

## 2020-02-28 DIAGNOSIS — E03.9 HYPOTHYROIDISM, UNSPECIFIED TYPE: ICD-10-CM

## 2020-02-28 DIAGNOSIS — N40.0 BENIGN PROSTATIC HYPERPLASIA, UNSPECIFIED WHETHER LOWER URINARY TRACT SYMPTOMS PRESENT: ICD-10-CM

## 2020-02-28 DIAGNOSIS — C14.0 THROAT CANCER (HCC): ICD-10-CM

## 2020-02-28 DIAGNOSIS — I10 BENIGN ESSENTIAL HYPERTENSION: ICD-10-CM

## 2020-02-28 DIAGNOSIS — Z23 NEED FOR PNEUMOCOCCAL VACCINATION: ICD-10-CM

## 2020-02-28 LAB
25(OH)D3 SERPL-MCNC: 27.2 NG/ML (ref 30–100)
ALBUMIN SERPL-MCNC: 4.4 G/DL (ref 3.5–5.2)
ALBUMIN/GLOB SERPL: 2.3 G/DL
ALP SERPL-CCNC: 49 U/L (ref 39–117)
ALT SERPL W P-5'-P-CCNC: 10 U/L (ref 1–41)
ANION GAP SERPL CALCULATED.3IONS-SCNC: 11.8 MMOL/L (ref 5–15)
AST SERPL-CCNC: 18 U/L (ref 1–40)
BASOPHILS # BLD AUTO: 0.08 10*3/MM3 (ref 0–0.2)
BASOPHILS NFR BLD AUTO: 1.4 % (ref 0–1.5)
BILIRUB SERPL-MCNC: 0.6 MG/DL (ref 0.2–1.2)
BUN BLD-MCNC: 15 MG/DL (ref 8–23)
BUN/CREAT SERPL: 16.3 (ref 7–25)
CALCIUM SPEC-SCNC: 8.8 MG/DL (ref 8.6–10.5)
CHLORIDE SERPL-SCNC: 101 MMOL/L (ref 98–107)
CHOLEST SERPL-MCNC: 170 MG/DL (ref 0–200)
CO2 SERPL-SCNC: 27.2 MMOL/L (ref 22–29)
CREAT BLD-MCNC: 0.92 MG/DL (ref 0.76–1.27)
DEPRECATED RDW RBC AUTO: 42.1 FL (ref 37–54)
EOSINOPHIL # BLD AUTO: 0.24 10*3/MM3 (ref 0–0.4)
EOSINOPHIL NFR BLD AUTO: 4.3 % (ref 0.3–6.2)
ERYTHROCYTE [DISTWIDTH] IN BLOOD BY AUTOMATED COUNT: 13.5 % (ref 12.3–15.4)
GFR SERPL CREATININE-BSD FRML MDRD: 83 ML/MIN/1.73
GLOBULIN UR ELPH-MCNC: 1.9 GM/DL
GLUCOSE BLD-MCNC: 78 MG/DL (ref 65–99)
HCT VFR BLD AUTO: 46 % (ref 37.5–51)
HDLC SERPL-MCNC: 57 MG/DL (ref 40–60)
HGB BLD-MCNC: 15.5 G/DL (ref 13–17.7)
IMM GRANULOCYTES # BLD AUTO: 0.02 10*3/MM3 (ref 0–0.05)
IMM GRANULOCYTES NFR BLD AUTO: 0.4 % (ref 0–0.5)
LDLC SERPL CALC-MCNC: 102 MG/DL (ref 0–100)
LDLC/HDLC SERPL: 1.79 {RATIO}
LYMPHOCYTES # BLD AUTO: 1.11 10*3/MM3 (ref 0.7–3.1)
LYMPHOCYTES NFR BLD AUTO: 20 % (ref 19.6–45.3)
MCH RBC QN AUTO: 28.9 PG (ref 26.6–33)
MCHC RBC AUTO-ENTMCNC: 33.7 G/DL (ref 31.5–35.7)
MCV RBC AUTO: 85.8 FL (ref 79–97)
MONOCYTES # BLD AUTO: 0.68 10*3/MM3 (ref 0.1–0.9)
MONOCYTES NFR BLD AUTO: 12.2 % (ref 5–12)
NEUTROPHILS # BLD AUTO: 3.43 10*3/MM3 (ref 1.7–7)
NEUTROPHILS NFR BLD AUTO: 61.7 % (ref 42.7–76)
NRBC BLD AUTO-RTO: 0 /100 WBC (ref 0–0.2)
PLATELET # BLD AUTO: 202 10*3/MM3 (ref 140–450)
PMV BLD AUTO: 10.4 FL (ref 6–12)
POTASSIUM BLD-SCNC: 4.2 MMOL/L (ref 3.5–5.2)
PROT SERPL-MCNC: 6.3 G/DL (ref 6–8.5)
RBC # BLD AUTO: 5.36 10*6/MM3 (ref 4.14–5.8)
SODIUM BLD-SCNC: 140 MMOL/L (ref 136–145)
TRIGL SERPL-MCNC: 55 MG/DL (ref 0–150)
TSH SERPL DL<=0.05 MIU/L-ACNC: 2.82 UIU/ML (ref 0.27–4.2)
VIT B12 BLD-MCNC: 318 PG/ML (ref 211–946)
VLDLC SERPL-MCNC: 11 MG/DL (ref 5–40)
WBC NRBC COR # BLD: 5.56 10*3/MM3 (ref 3.4–10.8)

## 2020-02-28 PROCEDURE — 84443 ASSAY THYROID STIM HORMONE: CPT | Performed by: NURSE PRACTITIONER

## 2020-02-28 PROCEDURE — 85025 COMPLETE CBC W/AUTO DIFF WBC: CPT | Performed by: NURSE PRACTITIONER

## 2020-02-28 PROCEDURE — 82607 VITAMIN B-12: CPT | Performed by: NURSE PRACTITIONER

## 2020-02-28 PROCEDURE — 80061 LIPID PANEL: CPT | Performed by: NURSE PRACTITIONER

## 2020-02-28 PROCEDURE — 80053 COMPREHEN METABOLIC PANEL: CPT | Performed by: NURSE PRACTITIONER

## 2020-02-28 PROCEDURE — 82306 VITAMIN D 25 HYDROXY: CPT | Performed by: NURSE PRACTITIONER

## 2020-03-17 DIAGNOSIS — I10 BENIGN ESSENTIAL HYPERTENSION: ICD-10-CM

## 2020-03-17 DIAGNOSIS — N40.0 BENIGN PROSTATIC HYPERPLASIA, UNSPECIFIED WHETHER LOWER URINARY TRACT SYMPTOMS PRESENT: ICD-10-CM

## 2020-03-17 DIAGNOSIS — E03.9 ACQUIRED HYPOTHYROIDISM: ICD-10-CM

## 2020-03-18 RX ORDER — NIFEDIPINE 30 MG/1
30 TABLET, EXTENDED RELEASE ORAL DAILY
Qty: 90 TABLET | Refills: 0 | Status: SHIPPED | OUTPATIENT
Start: 2020-03-18 | End: 2020-05-26

## 2020-03-18 RX ORDER — LEVOTHYROXINE SODIUM 0.1 MG/1
100 TABLET ORAL DAILY
Qty: 90 TABLET | Refills: 0 | Status: SHIPPED | OUTPATIENT
Start: 2020-03-18 | End: 2020-10-15

## 2020-03-18 RX ORDER — TAMSULOSIN HYDROCHLORIDE 0.4 MG/1
1 CAPSULE ORAL DAILY
Qty: 90 CAPSULE | Refills: 0 | Status: SHIPPED | OUTPATIENT
Start: 2020-03-18 | End: 2020-06-01

## 2020-05-25 DIAGNOSIS — I10 BENIGN ESSENTIAL HYPERTENSION: ICD-10-CM

## 2020-05-26 RX ORDER — NIFEDIPINE 30 MG/1
30 TABLET, EXTENDED RELEASE ORAL DAILY
Qty: 90 TABLET | Refills: 0 | Status: SHIPPED | OUTPATIENT
Start: 2020-05-26 | End: 2020-08-27

## 2020-05-30 DIAGNOSIS — N40.0 BENIGN PROSTATIC HYPERPLASIA, UNSPECIFIED WHETHER LOWER URINARY TRACT SYMPTOMS PRESENT: ICD-10-CM

## 2020-06-01 RX ORDER — TAMSULOSIN HYDROCHLORIDE 0.4 MG/1
1 CAPSULE ORAL DAILY
Qty: 90 CAPSULE | Refills: 0 | Status: SHIPPED | OUTPATIENT
Start: 2020-06-01 | End: 2020-08-27

## 2020-07-04 DIAGNOSIS — E03.9 ACQUIRED HYPOTHYROIDISM: ICD-10-CM

## 2020-07-06 RX ORDER — LEVOTHYROXINE SODIUM 0.1 MG/1
100 TABLET ORAL DAILY
Qty: 90 TABLET | Refills: 0 | OUTPATIENT
Start: 2020-07-06

## 2020-08-01 DIAGNOSIS — E03.9 ACQUIRED HYPOTHYROIDISM: ICD-10-CM

## 2020-08-04 RX ORDER — LEVOTHYROXINE SODIUM 0.1 MG/1
100 TABLET ORAL DAILY
Qty: 90 TABLET | Refills: 3 | OUTPATIENT
Start: 2020-08-04

## 2020-08-25 ENCOUNTER — OFFICE VISIT (OUTPATIENT)
Dept: INTERNAL MEDICINE | Facility: CLINIC | Age: 65
End: 2020-08-25

## 2020-08-25 ENCOUNTER — LAB (OUTPATIENT)
Dept: LAB | Facility: HOSPITAL | Age: 65
End: 2020-08-25

## 2020-08-25 VITALS
BODY MASS INDEX: 20.76 KG/M2 | TEMPERATURE: 97.3 F | HEIGHT: 74 IN | WEIGHT: 161.8 LBS | RESPIRATION RATE: 16 BRPM | SYSTOLIC BLOOD PRESSURE: 124 MMHG | DIASTOLIC BLOOD PRESSURE: 80 MMHG | HEART RATE: 75 BPM | OXYGEN SATURATION: 99 %

## 2020-08-25 DIAGNOSIS — C14.0 THROAT CANCER (HCC): ICD-10-CM

## 2020-08-25 DIAGNOSIS — E53.8 B12 DEFICIENCY: ICD-10-CM

## 2020-08-25 DIAGNOSIS — K21.9 GASTROESOPHAGEAL REFLUX DISEASE, ESOPHAGITIS PRESENCE NOT SPECIFIED: ICD-10-CM

## 2020-08-25 DIAGNOSIS — E55.9 VITAMIN D DEFICIENCY: ICD-10-CM

## 2020-08-25 DIAGNOSIS — I10 BENIGN ESSENTIAL HYPERTENSION: Primary | ICD-10-CM

## 2020-08-25 DIAGNOSIS — I10 BENIGN ESSENTIAL HYPERTENSION: ICD-10-CM

## 2020-08-25 DIAGNOSIS — N40.0 BENIGN PROSTATIC HYPERPLASIA, UNSPECIFIED WHETHER LOWER URINARY TRACT SYMPTOMS PRESENT: ICD-10-CM

## 2020-08-25 LAB
25(OH)D3 SERPL-MCNC: 29.2 NG/ML (ref 30–100)
ALBUMIN SERPL-MCNC: 4.6 G/DL (ref 3.5–5.2)
ALBUMIN/GLOB SERPL: 2 G/DL
ALP SERPL-CCNC: 54 U/L (ref 39–117)
ALT SERPL W P-5'-P-CCNC: 10 U/L (ref 1–41)
ANION GAP SERPL CALCULATED.3IONS-SCNC: 8.4 MMOL/L (ref 5–15)
AST SERPL-CCNC: 15 U/L (ref 1–40)
BASOPHILS # BLD AUTO: 0.08 10*3/MM3 (ref 0–0.2)
BASOPHILS NFR BLD AUTO: 1.4 % (ref 0–1.5)
BILIRUB SERPL-MCNC: 0.7 MG/DL (ref 0–1.2)
BUN SERPL-MCNC: 13 MG/DL (ref 8–23)
BUN/CREAT SERPL: 13.4 (ref 7–25)
CALCIUM SPEC-SCNC: 9.2 MG/DL (ref 8.6–10.5)
CHLORIDE SERPL-SCNC: 101 MMOL/L (ref 98–107)
CHOLEST SERPL-MCNC: 168 MG/DL (ref 0–200)
CO2 SERPL-SCNC: 28.6 MMOL/L (ref 22–29)
CREAT SERPL-MCNC: 0.97 MG/DL (ref 0.76–1.27)
DEPRECATED RDW RBC AUTO: 40.3 FL (ref 37–54)
EOSINOPHIL # BLD AUTO: 0.16 10*3/MM3 (ref 0–0.4)
EOSINOPHIL NFR BLD AUTO: 2.8 % (ref 0.3–6.2)
ERYTHROCYTE [DISTWIDTH] IN BLOOD BY AUTOMATED COUNT: 13.2 % (ref 12.3–15.4)
GFR SERPL CREATININE-BSD FRML MDRD: 78 ML/MIN/1.73
GLOBULIN UR ELPH-MCNC: 2.3 GM/DL
GLUCOSE SERPL-MCNC: 92 MG/DL (ref 65–99)
HCT VFR BLD AUTO: 44.2 % (ref 37.5–51)
HDLC SERPL-MCNC: 58 MG/DL (ref 40–60)
HGB BLD-MCNC: 15.1 G/DL (ref 13–17.7)
IMM GRANULOCYTES # BLD AUTO: 0.04 10*3/MM3 (ref 0–0.05)
IMM GRANULOCYTES NFR BLD AUTO: 0.7 % (ref 0–0.5)
LDLC SERPL CALC-MCNC: 97 MG/DL (ref 0–100)
LDLC/HDLC SERPL: 1.67 {RATIO}
LYMPHOCYTES # BLD AUTO: 1.07 10*3/MM3 (ref 0.7–3.1)
LYMPHOCYTES NFR BLD AUTO: 18.4 % (ref 19.6–45.3)
MCH RBC QN AUTO: 28.7 PG (ref 26.6–33)
MCHC RBC AUTO-ENTMCNC: 34.2 G/DL (ref 31.5–35.7)
MCV RBC AUTO: 84 FL (ref 79–97)
MONOCYTES # BLD AUTO: 0.68 10*3/MM3 (ref 0.1–0.9)
MONOCYTES NFR BLD AUTO: 11.7 % (ref 5–12)
NEUTROPHILS NFR BLD AUTO: 3.77 10*3/MM3 (ref 1.7–7)
NEUTROPHILS NFR BLD AUTO: 65 % (ref 42.7–76)
NRBC BLD AUTO-RTO: 0 /100 WBC (ref 0–0.2)
PLATELET # BLD AUTO: 209 10*3/MM3 (ref 140–450)
PMV BLD AUTO: 10.8 FL (ref 6–12)
POTASSIUM SERPL-SCNC: 3.9 MMOL/L (ref 3.5–5.2)
PROT SERPL-MCNC: 6.9 G/DL (ref 6–8.5)
RBC # BLD AUTO: 5.26 10*6/MM3 (ref 4.14–5.8)
SODIUM SERPL-SCNC: 138 MMOL/L (ref 136–145)
T4 FREE SERPL-MCNC: 1.83 NG/DL (ref 0.93–1.7)
TRIGL SERPL-MCNC: 65 MG/DL (ref 0–150)
TSH SERPL DL<=0.05 MIU/L-ACNC: 1.54 UIU/ML (ref 0.27–4.2)
VIT B12 BLD-MCNC: 325 PG/ML (ref 211–946)
VLDLC SERPL-MCNC: 13 MG/DL (ref 5–40)
WBC # BLD AUTO: 5.8 10*3/MM3 (ref 3.4–10.8)

## 2020-08-25 PROCEDURE — 85025 COMPLETE CBC W/AUTO DIFF WBC: CPT

## 2020-08-25 PROCEDURE — 84443 ASSAY THYROID STIM HORMONE: CPT

## 2020-08-25 PROCEDURE — 99214 OFFICE O/P EST MOD 30 MIN: CPT | Performed by: NURSE PRACTITIONER

## 2020-08-25 PROCEDURE — 84439 ASSAY OF FREE THYROXINE: CPT

## 2020-08-25 PROCEDURE — 82306 VITAMIN D 25 HYDROXY: CPT

## 2020-08-25 PROCEDURE — 82607 VITAMIN B-12: CPT

## 2020-08-25 PROCEDURE — 80053 COMPREHEN METABOLIC PANEL: CPT

## 2020-08-25 PROCEDURE — 80061 LIPID PANEL: CPT

## 2020-08-25 NOTE — PROGRESS NOTES
Subjective   Boone Riley is a 64 y.o. male    Chief Complaint   Patient presents with   • 6 month follow up   • Hypertension   • Hypothyroidism   • Benign Prostatic Hypertrophy     History of Present Illness     BPH - Seeing Urology ([facility]).  Has him on the Flomax.  Takes PRN meds for ED.  Was given 20mg Sildenafil that he takes PRN/rarely.  Does well with this regimen w/o SE.       HTN is well controlled on Procardia XL 30mg; did not tolerate an ACE or an ARB.  Checks his BP intermittently at home.    BP is generally within normal limits.  States that he does have low readings occasionally.  He just hold his blood pressure medicine for that day, but that seems to be happening less and less.  He is able to recognize it more quickly now.        Hypothyroid - currently taking 100mcg of Synthroid, due for labs      GERD - chronic and stable on Omeprazole 20mg daily; sx's well controlled      H/O throat (base of tongue) cancer.  Dx'd in 2008.  Followed by Dr. CAMACHO.  All recent reports have been good. Seeing him on a yearly basis now.      Vit D and B12 def - forgets to take his supplements a great deal.      Flu shot - 10/2019 @ work  Tdap - 2014  PSA - per Urology  colon - 3/2019  Hep A - 2/2019, 8/2019       The following portions of the patient's history were reviewed and updated as appropriate: allergies, current medications, past family history, past medical history, past social history, past surgical history and problem list.    Current Outpatient Medications:   •  levothyroxine (SYNTHROID, LEVOTHROID) 100 MCG tablet, TAKE 1 TABLET BY MOUTH  DAILY, Disp: 90 tablet, Rfl: 0  •  NIFEdipine XL (PROCARDIA XL) 30 MG 24 hr tablet, TAKE 1 TABLET BY MOUTH  DAILY, Disp: 90 tablet, Rfl: 0  •  omeprazole (priLOSEC) 20 MG capsule, Take 1 capsule by mouth Daily., Disp: 90 capsule, Rfl: 1  •  sildenafil (REVATIO) 20 MG tablet, 2-5 PO as needed for sexual activity, Disp: 50 tablet, Rfl: 2  •  tamsulosin (FLOMAX) 0.4 MG  "capsule 24 hr capsule, TAKE 1 CAPSULE BY MOUTH  DAILY, Disp: 90 capsule, Rfl: 0     Review of Systems   Constitutional: Negative for chills, fatigue and fever.   Respiratory: Negative for cough, chest tightness and shortness of breath.    Cardiovascular: Negative for chest pain.   Gastrointestinal: Negative for abdominal pain, diarrhea, nausea and vomiting.   Endocrine: Negative for cold intolerance and heat intolerance.   Musculoskeletal: Negative for arthralgias.   Neurological: Negative for dizziness.       Objective   Physical Exam   Constitutional: He is oriented to person, place, and time. He appears well-developed and well-nourished.   HENT:   Head: Normocephalic and atraumatic.   Eyes: Pupils are equal, round, and reactive to light. Conjunctivae and EOM are normal.   Neck: Normal range of motion.   Cardiovascular: Normal rate, regular rhythm and normal heart sounds.   Pulmonary/Chest: Effort normal and breath sounds normal.   Abdominal: Soft. Bowel sounds are normal.   Musculoskeletal: Normal range of motion.   Neurological: He is alert and oriented to person, place, and time. He has normal reflexes.   Skin: Skin is warm and dry.   Psychiatric: He has a normal mood and affect. His behavior is normal. Judgment and thought content normal.     Vitals:    08/25/20 0824   BP: 124/80   Pulse: 75   Resp: 16   Temp: 97.3 °F (36.3 °C)   TempSrc: Infrared   SpO2: 99%   Weight: 73.4 kg (161 lb 12.8 oz)   Height: 188 cm (74.02\")         Assessment/Plan   Boone was seen today for 6 month follow up, hypertension, hypothyroidism and benign prostatic hypertrophy.    Diagnoses and all orders for this visit:    Benign essential hypertension  -     CBC & Differential; Future  -     Comprehensive Metabolic Panel; Future  -     Lipid Panel; Future  -     TSH; Future  -     Vitamin B12; Future  -     Vitamin D 25 Hydroxy; Future  -     T4, Free; Future    Throat cancer (CMS/HCC)  -     CBC & Differential; Future  -     " Comprehensive Metabolic Panel; Future  -     Lipid Panel; Future  -     TSH; Future  -     Vitamin B12; Future  -     Vitamin D 25 Hydroxy; Future  -     T4, Free; Future    Gastroesophageal reflux disease, esophagitis presence not specified  -     CBC & Differential; Future  -     Comprehensive Metabolic Panel; Future  -     Lipid Panel; Future  -     TSH; Future  -     Vitamin B12; Future  -     Vitamin D 25 Hydroxy; Future  -     T4, Free; Future    Benign prostatic hyperplasia, unspecified whether lower urinary tract symptoms present  -     CBC & Differential; Future  -     Comprehensive Metabolic Panel; Future  -     Lipid Panel; Future  -     TSH; Future  -     Vitamin B12; Future  -     Vitamin D 25 Hydroxy; Future  -     T4, Free; Future    Vitamin D deficiency  -     CBC & Differential; Future  -     Comprehensive Metabolic Panel; Future  -     Lipid Panel; Future  -     TSH; Future  -     Vitamin B12; Future  -     Vitamin D 25 Hydroxy; Future  -     T4, Free; Future    B12 deficiency  -     CBC & Differential; Future  -     Comprehensive Metabolic Panel; Future  -     Lipid Panel; Future  -     TSH; Future  -     Vitamin B12; Future  -     Vitamin D 25 Hydroxy; Future  -     T4, Free; Future      Labs sent  No refills needed today  Will RTC for flu shot later this fall  Return in about 6 months (around 2/25/2021) for Annual.

## 2020-08-27 DIAGNOSIS — I10 BENIGN ESSENTIAL HYPERTENSION: ICD-10-CM

## 2020-08-27 DIAGNOSIS — N40.0 BENIGN PROSTATIC HYPERPLASIA, UNSPECIFIED WHETHER LOWER URINARY TRACT SYMPTOMS PRESENT: ICD-10-CM

## 2020-08-27 DIAGNOSIS — K21.9 GASTROESOPHAGEAL REFLUX DISEASE, ESOPHAGITIS PRESENCE NOT SPECIFIED: ICD-10-CM

## 2020-08-27 RX ORDER — NIFEDIPINE 30 MG/1
30 TABLET, EXTENDED RELEASE ORAL DAILY
Qty: 90 TABLET | Refills: 3 | Status: SHIPPED | OUTPATIENT
Start: 2020-08-27 | End: 2021-09-07 | Stop reason: SDUPTHER

## 2020-08-27 RX ORDER — OMEPRAZOLE 20 MG/1
20 CAPSULE, DELAYED RELEASE ORAL DAILY
Qty: 90 CAPSULE | Refills: 3 | Status: SHIPPED | OUTPATIENT
Start: 2020-08-27 | End: 2022-03-08 | Stop reason: SDUPTHER

## 2020-08-27 RX ORDER — TAMSULOSIN HYDROCHLORIDE 0.4 MG/1
1 CAPSULE ORAL DAILY
Qty: 90 CAPSULE | Refills: 3 | Status: SHIPPED | OUTPATIENT
Start: 2020-08-27 | End: 2022-03-08

## 2020-10-13 DIAGNOSIS — E03.9 ACQUIRED HYPOTHYROIDISM: ICD-10-CM

## 2020-10-15 RX ORDER — LEVOTHYROXINE SODIUM 0.1 MG/1
100 TABLET ORAL DAILY
Qty: 90 TABLET | Refills: 3 | Status: SHIPPED | OUTPATIENT
Start: 2020-10-15 | End: 2021-09-07 | Stop reason: SDUPTHER

## 2021-03-02 ENCOUNTER — LAB (OUTPATIENT)
Dept: LAB | Facility: HOSPITAL | Age: 66
End: 2021-03-02

## 2021-03-02 ENCOUNTER — OFFICE VISIT (OUTPATIENT)
Dept: INTERNAL MEDICINE | Facility: CLINIC | Age: 66
End: 2021-03-02

## 2021-03-02 VITALS
HEIGHT: 73 IN | HEART RATE: 76 BPM | BODY MASS INDEX: 21.02 KG/M2 | OXYGEN SATURATION: 98 % | SYSTOLIC BLOOD PRESSURE: 118 MMHG | RESPIRATION RATE: 16 BRPM | TEMPERATURE: 97.1 F | WEIGHT: 158.6 LBS | DIASTOLIC BLOOD PRESSURE: 72 MMHG

## 2021-03-02 DIAGNOSIS — Z00.00 ROUTINE GENERAL MEDICAL EXAMINATION AT A HEALTH CARE FACILITY: ICD-10-CM

## 2021-03-02 DIAGNOSIS — K21.9 GASTROESOPHAGEAL REFLUX DISEASE, UNSPECIFIED WHETHER ESOPHAGITIS PRESENT: ICD-10-CM

## 2021-03-02 DIAGNOSIS — I10 BENIGN ESSENTIAL HYPERTENSION: ICD-10-CM

## 2021-03-02 DIAGNOSIS — N40.0 BENIGN PROSTATIC HYPERPLASIA, UNSPECIFIED WHETHER LOWER URINARY TRACT SYMPTOMS PRESENT: ICD-10-CM

## 2021-03-02 DIAGNOSIS — C14.0 THROAT CANCER (HCC): ICD-10-CM

## 2021-03-02 DIAGNOSIS — E55.9 VITAMIN D DEFICIENCY: ICD-10-CM

## 2021-03-02 DIAGNOSIS — E03.9 HYPOTHYROIDISM, UNSPECIFIED TYPE: ICD-10-CM

## 2021-03-02 DIAGNOSIS — E53.8 B12 DEFICIENCY: ICD-10-CM

## 2021-03-02 DIAGNOSIS — Z00.00 ROUTINE GENERAL MEDICAL EXAMINATION AT A HEALTH CARE FACILITY: Primary | ICD-10-CM

## 2021-03-02 DIAGNOSIS — R05.9 COUGH: ICD-10-CM

## 2021-03-02 LAB
25(OH)D3 SERPL-MCNC: 39.7 NG/ML
ALBUMIN SERPL-MCNC: 4.7 G/DL (ref 3.5–5.2)
ALBUMIN/GLOB SERPL: 2 G/DL
ALP SERPL-CCNC: 68 U/L (ref 39–117)
ALT SERPL W P-5'-P-CCNC: 11 U/L (ref 1–41)
ANION GAP SERPL CALCULATED.3IONS-SCNC: 10.1 MMOL/L (ref 5–15)
AST SERPL-CCNC: 16 U/L (ref 1–40)
BASOPHILS # BLD AUTO: 0.06 10*3/MM3 (ref 0–0.2)
BASOPHILS NFR BLD AUTO: 1 % (ref 0–1.5)
BILIRUB SERPL-MCNC: 0.6 MG/DL (ref 0–1.2)
BUN SERPL-MCNC: 10 MG/DL (ref 8–23)
BUN/CREAT SERPL: 9.9 (ref 7–25)
CALCIUM SPEC-SCNC: 9.3 MG/DL (ref 8.6–10.5)
CHLORIDE SERPL-SCNC: 101 MMOL/L (ref 98–107)
CHOLEST SERPL-MCNC: 154 MG/DL (ref 0–200)
CO2 SERPL-SCNC: 30.9 MMOL/L (ref 22–29)
CREAT SERPL-MCNC: 1.01 MG/DL (ref 0.76–1.27)
DEPRECATED RDW RBC AUTO: 40.5 FL (ref 37–54)
EOSINOPHIL # BLD AUTO: 0.11 10*3/MM3 (ref 0–0.4)
EOSINOPHIL NFR BLD AUTO: 1.8 % (ref 0.3–6.2)
ERYTHROCYTE [DISTWIDTH] IN BLOOD BY AUTOMATED COUNT: 13.1 % (ref 12.3–15.4)
GFR SERPL CREATININE-BSD FRML MDRD: 75 ML/MIN/1.73
GLOBULIN UR ELPH-MCNC: 2.3 GM/DL
GLUCOSE SERPL-MCNC: 87 MG/DL (ref 65–99)
HCT VFR BLD AUTO: 46.7 % (ref 37.5–51)
HDLC SERPL-MCNC: 52 MG/DL (ref 40–60)
HGB BLD-MCNC: 16 G/DL (ref 13–17.7)
IMM GRANULOCYTES # BLD AUTO: 0.04 10*3/MM3 (ref 0–0.05)
IMM GRANULOCYTES NFR BLD AUTO: 0.7 % (ref 0–0.5)
LDLC SERPL CALC-MCNC: 89 MG/DL (ref 0–100)
LDLC/HDLC SERPL: 1.72 {RATIO}
LYMPHOCYTES # BLD AUTO: 1.07 10*3/MM3 (ref 0.7–3.1)
LYMPHOCYTES NFR BLD AUTO: 17.7 % (ref 19.6–45.3)
MCH RBC QN AUTO: 28.9 PG (ref 26.6–33)
MCHC RBC AUTO-ENTMCNC: 34.3 G/DL (ref 31.5–35.7)
MCV RBC AUTO: 84.4 FL (ref 79–97)
MONOCYTES # BLD AUTO: 0.7 10*3/MM3 (ref 0.1–0.9)
MONOCYTES NFR BLD AUTO: 11.6 % (ref 5–12)
NEUTROPHILS NFR BLD AUTO: 4.05 10*3/MM3 (ref 1.7–7)
NEUTROPHILS NFR BLD AUTO: 67.2 % (ref 42.7–76)
NRBC BLD AUTO-RTO: 0 /100 WBC (ref 0–0.2)
PLATELET # BLD AUTO: 228 10*3/MM3 (ref 140–450)
PMV BLD AUTO: 10.7 FL (ref 6–12)
POTASSIUM SERPL-SCNC: 4.3 MMOL/L (ref 3.5–5.2)
PROT SERPL-MCNC: 7 G/DL (ref 6–8.5)
RBC # BLD AUTO: 5.53 10*6/MM3 (ref 4.14–5.8)
SODIUM SERPL-SCNC: 142 MMOL/L (ref 136–145)
TRIGL SERPL-MCNC: 64 MG/DL (ref 0–150)
TSH SERPL DL<=0.05 MIU/L-ACNC: 1.59 UIU/ML (ref 0.27–4.2)
VIT B12 BLD-MCNC: 391 PG/ML (ref 211–946)
VLDLC SERPL-MCNC: 13 MG/DL (ref 5–40)
WBC # BLD AUTO: 6.03 10*3/MM3 (ref 3.4–10.8)

## 2021-03-02 PROCEDURE — 99396 PREV VISIT EST AGE 40-64: CPT | Performed by: NURSE PRACTITIONER

## 2021-03-02 PROCEDURE — 82306 VITAMIN D 25 HYDROXY: CPT | Performed by: NURSE PRACTITIONER

## 2021-03-02 PROCEDURE — 84443 ASSAY THYROID STIM HORMONE: CPT | Performed by: NURSE PRACTITIONER

## 2021-03-02 PROCEDURE — 82607 VITAMIN B-12: CPT | Performed by: NURSE PRACTITIONER

## 2021-03-02 PROCEDURE — 80053 COMPREHEN METABOLIC PANEL: CPT | Performed by: NURSE PRACTITIONER

## 2021-03-02 PROCEDURE — 85025 COMPLETE CBC W/AUTO DIFF WBC: CPT | Performed by: NURSE PRACTITIONER

## 2021-03-02 PROCEDURE — 80061 LIPID PANEL: CPT | Performed by: NURSE PRACTITIONER

## 2021-03-02 NOTE — PROGRESS NOTES
Subjective   Boone Riley is a 65 y.o. male    Chief Complaint   Patient presents with   • Annual Exam     History of Present Illness     Here for annual exam    BPH - Seeing Urology (Mario).  Has him on the Flomax.  Takes PRN meds for ED.  Was given 20mg Sildenafil that he takes PRN/rarely.  Does well with this regimen w/o SE.       HTN is well controlled on Procardia XL 30mg; did not tolerate an ACE or an ARB.  Checks his BP intermittently at home.    BP is generally within normal limits.  States that he does have low readings occasionally.  He just hold his blood pressure medicine for that day, but that seems to be happening less and less.  He is able to recognize it more quickly now.        Hypothyroid - currently taking 100mcg of Synthroid, due for labs      GERD - chronic and stable on Omeprazole 20mg daily; sx's well controlled.  Does not take on a daily basis.        H/O throat (base of tongue) cancer.  Dx'd in 2008.  Followed by Dr. CAMACHO.  All recent reports have been good. Seeing him on a yearly basis now.  All good reports!!     Vit D and B12 def - forgets to take his supplements a great deal.    Pt has noted in the last few months that when he takes a deep breath, it ilicits a cough.  He denies SOA.  Cough is not productive.        Flu shot - 10/2020 @ work  COVID - 12/23/20, 1/22/21  Tdap - 2014  PSA - per Urology yearly.    colon - 3/2019  Hep A - 2/2019, 8/2019     Diet - healthy/balanced    Exercise - minimal    Social History     Tobacco Use   • Smoking status: Former Smoker   • Smokeless tobacco: Never Used   Substance Use Topics   • Alcohol use: No   • Drug use: No         The following portions of the patient's history were reviewed and updated as appropriate: allergies, current medications, past family history, past medical history, past social history, past surgical history and problem list.    Current Outpatient Medications:   •  levothyroxine (SYNTHROID, LEVOTHROID) 100 MCG tablet, TAKE  1 TABLET BY MOUTH  DAILY, Disp: 90 tablet, Rfl: 3  •  NIFEdipine XL (PROCARDIA XL) 30 MG 24 hr tablet, TAKE 1 TABLET BY MOUTH  DAILY, Disp: 90 tablet, Rfl: 3  •  omeprazole (priLOSEC) 20 MG capsule, TAKE 1 CAPSULE BY MOUTH  DAILY, Disp: 90 capsule, Rfl: 3  •  sildenafil (REVATIO) 20 MG tablet, 2-5 PO as needed for sexual activity, Disp: 50 tablet, Rfl: 2  •  tamsulosin (FLOMAX) 0.4 MG capsule 24 hr capsule, TAKE 1 CAPSULE BY MOUTH  DAILY, Disp: 90 capsule, Rfl: 3     Review of Systems   Constitutional: Negative for appetite change, chills, fatigue, fever and unexpected weight change.   HENT: Negative for congestion, ear pain, nosebleeds, rhinorrhea and tinnitus.    Eyes: Negative for pain.   Respiratory: Positive for cough. Negative for chest tightness and shortness of breath.    Cardiovascular: Negative for chest pain, palpitations and leg swelling.   Gastrointestinal: Negative for abdominal distention, abdominal pain, blood in stool, constipation, diarrhea, nausea and vomiting.   Endocrine: Negative for cold intolerance, heat intolerance, polydipsia, polyphagia and polyuria.   Genitourinary: Negative for dysuria, flank pain, frequency, hematuria and urgency.   Musculoskeletal: Negative for arthralgias, back pain, gait problem, joint swelling, myalgias and neck pain.   Skin: Negative for color change, pallor, rash and wound.   Allergic/Immunologic: Negative for environmental allergies and food allergies.   Neurological: Negative for dizziness, syncope, weakness, light-headedness, numbness and headaches.   Hematological: Negative for adenopathy. Does not bruise/bleed easily.   Psychiatric/Behavioral: Negative for behavioral problems and suicidal ideas. The patient is not nervous/anxious.        Objective   Physical Exam  Constitutional:       Appearance: He is well-developed and normal weight.   HENT:      Head: Normocephalic and atraumatic.      Right Ear: External ear normal.      Left Ear: External ear normal.     "  Nose: Nose normal.      Mouth/Throat:      Mouth: Mucous membranes are moist.      Pharynx: Oropharynx is clear.   Eyes:      General: Lids are normal.      Conjunctiva/sclera: Conjunctivae normal.      Pupils: Pupils are equal, round, and reactive to light.   Neck:      Musculoskeletal: Normal range of motion and neck supple.      Vascular: No carotid bruit.   Cardiovascular:      Rate and Rhythm: Normal rate and regular rhythm.      Pulses: Normal pulses.      Heart sounds: Normal heart sounds. No murmur.   Pulmonary:      Effort: Pulmonary effort is normal.      Breath sounds: Normal breath sounds.   Abdominal:      General: Bowel sounds are normal.      Palpations: Abdomen is soft. There is no hepatomegaly or splenomegaly.      Hernia: No hernia is present.   Musculoskeletal: Normal range of motion.   Lymphadenopathy:      Cervical: No cervical adenopathy.   Skin:     General: Skin is warm and dry.      Capillary Refill: Capillary refill takes less than 2 seconds.   Neurological:      Mental Status: He is alert and oriented to person, place, and time.      Deep Tendon Reflexes: Reflexes are normal and symmetric.   Psychiatric:         Mood and Affect: Mood normal.         Behavior: Behavior normal.         Thought Content: Thought content normal.         Judgment: Judgment normal.       Vitals:    03/02/21 0850   BP: 118/72   Pulse: 76   Resp: 16   Temp: 97.1 °F (36.2 °C)   TempSrc: Infrared   SpO2: 98%   Weight: 71.9 kg (158 lb 9.6 oz)   Height: 185.4 cm (73\")         Assessment/Plan   Diagnoses and all orders for this visit:    1. Routine general medical examination at a health care facility (Primary)  -     CBC & Differential; Future  -     Comprehensive Metabolic Panel; Future  -     Lipid Panel; Future  -     TSH; Future  -     Vitamin B12; Future  -     Vitamin D 25 Hydroxy; Future  -     TSH; Future    2. Benign essential hypertension  -     CBC & Differential; Future  -     Comprehensive Metabolic " Panel; Future  -     Lipid Panel; Future  -     TSH; Future  -     Vitamin B12; Future  -     Vitamin D 25 Hydroxy; Future  -     TSH; Future    3. Hypothyroidism, unspecified type  -     CBC & Differential; Future  -     Comprehensive Metabolic Panel; Future  -     Lipid Panel; Future  -     TSH; Future  -     Vitamin B12; Future  -     Vitamin D 25 Hydroxy; Future  -     TSH; Future    4. Gastroesophageal reflux disease, unspecified whether esophagitis present  -     CBC & Differential; Future  -     Comprehensive Metabolic Panel; Future  -     Lipid Panel; Future  -     TSH; Future  -     Vitamin B12; Future  -     Vitamin D 25 Hydroxy; Future  -     TSH; Future    5. Benign prostatic hyperplasia, unspecified whether lower urinary tract symptoms present  -     CBC & Differential; Future  -     Comprehensive Metabolic Panel; Future  -     Lipid Panel; Future  -     TSH; Future  -     Vitamin B12; Future  -     Vitamin D 25 Hydroxy; Future  -     TSH; Future    6. Throat cancer (CMS/HCC)  -     CBC & Differential; Future  -     Comprehensive Metabolic Panel; Future  -     Lipid Panel; Future  -     TSH; Future  -     Vitamin B12; Future  -     Vitamin D 25 Hydroxy; Future  -     TSH; Future    7. Vitamin D deficiency  -     CBC & Differential; Future  -     Comprehensive Metabolic Panel; Future  -     Lipid Panel; Future  -     TSH; Future  -     Vitamin B12; Future  -     Vitamin D 25 Hydroxy; Future  -     TSH; Future    8. B12 deficiency  -     CBC & Differential; Future  -     Comprehensive Metabolic Panel; Future  -     Lipid Panel; Future  -     TSH; Future  -     Vitamin B12; Future  -     Vitamin D 25 Hydroxy; Future  -     TSH; Future    9. Cough  -     XR Chest PA & Lateral; Future      Labs sent  No med changes  No refills needed  Sent for CXR to further evaluate cough  Counseling - diet and exercise  Return in about 6 months (around 9/2/2021).

## 2021-09-05 DIAGNOSIS — N40.0 BENIGN PROSTATIC HYPERPLASIA, UNSPECIFIED WHETHER LOWER URINARY TRACT SYMPTOMS PRESENT: ICD-10-CM

## 2021-09-05 DIAGNOSIS — I10 BENIGN ESSENTIAL HYPERTENSION: ICD-10-CM

## 2021-09-05 DIAGNOSIS — E03.9 ACQUIRED HYPOTHYROIDISM: ICD-10-CM

## 2021-09-07 ENCOUNTER — OFFICE VISIT (OUTPATIENT)
Dept: INTERNAL MEDICINE | Facility: CLINIC | Age: 66
End: 2021-09-07

## 2021-09-07 ENCOUNTER — LAB (OUTPATIENT)
Dept: LAB | Facility: HOSPITAL | Age: 66
End: 2021-09-07

## 2021-09-07 VITALS
WEIGHT: 161 LBS | RESPIRATION RATE: 16 BRPM | SYSTOLIC BLOOD PRESSURE: 122 MMHG | HEIGHT: 73 IN | DIASTOLIC BLOOD PRESSURE: 82 MMHG | BODY MASS INDEX: 21.34 KG/M2 | TEMPERATURE: 97.3 F | OXYGEN SATURATION: 98 % | HEART RATE: 70 BPM

## 2021-09-07 DIAGNOSIS — N40.0 BENIGN PROSTATIC HYPERPLASIA, UNSPECIFIED WHETHER LOWER URINARY TRACT SYMPTOMS PRESENT: ICD-10-CM

## 2021-09-07 DIAGNOSIS — E55.9 VITAMIN D DEFICIENCY: ICD-10-CM

## 2021-09-07 DIAGNOSIS — I10 BENIGN ESSENTIAL HYPERTENSION: ICD-10-CM

## 2021-09-07 DIAGNOSIS — E03.9 ACQUIRED HYPOTHYROIDISM: ICD-10-CM

## 2021-09-07 DIAGNOSIS — E03.9 ACQUIRED HYPOTHYROIDISM: Primary | ICD-10-CM

## 2021-09-07 LAB
25(OH)D3 SERPL-MCNC: 25.9 NG/ML
ALBUMIN SERPL-MCNC: 4.8 G/DL (ref 3.5–5.2)
ALBUMIN/GLOB SERPL: 2.1 G/DL
ALP SERPL-CCNC: 59 U/L (ref 39–117)
ALT SERPL W P-5'-P-CCNC: 8 U/L (ref 1–41)
ANION GAP SERPL CALCULATED.3IONS-SCNC: 9.7 MMOL/L (ref 5–15)
AST SERPL-CCNC: 16 U/L (ref 1–40)
BASOPHILS # BLD AUTO: 0.08 10*3/MM3 (ref 0–0.2)
BASOPHILS NFR BLD AUTO: 1.4 % (ref 0–1.5)
BILIRUB SERPL-MCNC: 0.7 MG/DL (ref 0–1.2)
BUN SERPL-MCNC: 12 MG/DL (ref 8–23)
BUN/CREAT SERPL: 11.4 (ref 7–25)
CALCIUM SPEC-SCNC: 9.2 MG/DL (ref 8.6–10.5)
CHLORIDE SERPL-SCNC: 104 MMOL/L (ref 98–107)
CHOLEST SERPL-MCNC: 187 MG/DL (ref 0–200)
CO2 SERPL-SCNC: 28.3 MMOL/L (ref 22–29)
CREAT SERPL-MCNC: 1.05 MG/DL (ref 0.76–1.27)
DEPRECATED RDW RBC AUTO: 42.6 FL (ref 37–54)
EOSINOPHIL # BLD AUTO: 0.13 10*3/MM3 (ref 0–0.4)
EOSINOPHIL NFR BLD AUTO: 2.2 % (ref 0.3–6.2)
ERYTHROCYTE [DISTWIDTH] IN BLOOD BY AUTOMATED COUNT: 13.5 % (ref 12.3–15.4)
GFR SERPL CREATININE-BSD FRML MDRD: 71 ML/MIN/1.73
GLOBULIN UR ELPH-MCNC: 2.3 GM/DL
GLUCOSE SERPL-MCNC: 88 MG/DL (ref 65–99)
HCT VFR BLD AUTO: 48.7 % (ref 37.5–51)
HDLC SERPL-MCNC: 64 MG/DL (ref 40–60)
HGB BLD-MCNC: 15.8 G/DL (ref 13–17.7)
IMM GRANULOCYTES # BLD AUTO: 0.04 10*3/MM3 (ref 0–0.05)
IMM GRANULOCYTES NFR BLD AUTO: 0.7 % (ref 0–0.5)
LDLC SERPL CALC-MCNC: 111 MG/DL (ref 0–100)
LDLC/HDLC SERPL: 1.72 {RATIO}
LYMPHOCYTES # BLD AUTO: 1.22 10*3/MM3 (ref 0.7–3.1)
LYMPHOCYTES NFR BLD AUTO: 20.7 % (ref 19.6–45.3)
MCH RBC QN AUTO: 28.2 PG (ref 26.6–33)
MCHC RBC AUTO-ENTMCNC: 32.4 G/DL (ref 31.5–35.7)
MCV RBC AUTO: 87 FL (ref 79–97)
MONOCYTES # BLD AUTO: 0.78 10*3/MM3 (ref 0.1–0.9)
MONOCYTES NFR BLD AUTO: 13.2 % (ref 5–12)
NEUTROPHILS NFR BLD AUTO: 3.65 10*3/MM3 (ref 1.7–7)
NEUTROPHILS NFR BLD AUTO: 61.8 % (ref 42.7–76)
NRBC BLD AUTO-RTO: 0 /100 WBC (ref 0–0.2)
PLATELET # BLD AUTO: 192 10*3/MM3 (ref 140–450)
PMV BLD AUTO: 11 FL (ref 6–12)
POTASSIUM SERPL-SCNC: 3.9 MMOL/L (ref 3.5–5.2)
PROT SERPL-MCNC: 7.1 G/DL (ref 6–8.5)
RBC # BLD AUTO: 5.6 10*6/MM3 (ref 4.14–5.8)
SODIUM SERPL-SCNC: 142 MMOL/L (ref 136–145)
TRIGL SERPL-MCNC: 65 MG/DL (ref 0–150)
TSH SERPL DL<=0.05 MIU/L-ACNC: 3.08 UIU/ML (ref 0.27–4.2)
VIT B12 BLD-MCNC: 329 PG/ML (ref 211–946)
VLDLC SERPL-MCNC: 12 MG/DL (ref 5–40)
WBC # BLD AUTO: 5.9 10*3/MM3 (ref 3.4–10.8)

## 2021-09-07 PROCEDURE — 82607 VITAMIN B-12: CPT | Performed by: NURSE PRACTITIONER

## 2021-09-07 PROCEDURE — 84443 ASSAY THYROID STIM HORMONE: CPT | Performed by: NURSE PRACTITIONER

## 2021-09-07 PROCEDURE — 85025 COMPLETE CBC W/AUTO DIFF WBC: CPT | Performed by: NURSE PRACTITIONER

## 2021-09-07 PROCEDURE — 99214 OFFICE O/P EST MOD 30 MIN: CPT | Performed by: NURSE PRACTITIONER

## 2021-09-07 PROCEDURE — 80053 COMPREHEN METABOLIC PANEL: CPT | Performed by: NURSE PRACTITIONER

## 2021-09-07 PROCEDURE — 80061 LIPID PANEL: CPT | Performed by: NURSE PRACTITIONER

## 2021-09-07 PROCEDURE — 82306 VITAMIN D 25 HYDROXY: CPT | Performed by: NURSE PRACTITIONER

## 2021-09-07 RX ORDER — LEVOTHYROXINE SODIUM 0.1 MG/1
100 TABLET ORAL DAILY
Qty: 90 TABLET | Refills: 3 | Status: SHIPPED | OUTPATIENT
Start: 2021-09-07 | End: 2022-03-08

## 2021-09-07 RX ORDER — TAMSULOSIN HYDROCHLORIDE 0.4 MG/1
1 CAPSULE ORAL DAILY
Qty: 90 CAPSULE | Refills: 3 | OUTPATIENT
Start: 2021-09-07

## 2021-09-07 RX ORDER — NIFEDIPINE 30 MG/1
30 TABLET, EXTENDED RELEASE ORAL DAILY
Qty: 90 TABLET | Refills: 3 | OUTPATIENT
Start: 2021-09-07

## 2021-09-07 RX ORDER — NIFEDIPINE 30 MG/1
30 TABLET, EXTENDED RELEASE ORAL DAILY
Qty: 90 TABLET | Refills: 3 | Status: SHIPPED | OUTPATIENT
Start: 2021-09-07 | End: 2022-03-08 | Stop reason: SDUPTHER

## 2021-09-07 NOTE — PROGRESS NOTES
Subjective   Boone Riley is a 65 y.o. male    Chief Complaint   Patient presents with   • Hypothyroidism     f/u hasn't taken his levothyroxine for 4 days    • Hypertension   • Med Refill     History of Present Illness     BPH - Seeing Urology ([facility]).  Has him on the Flomax.  Takes PRN meds for ED.  Was given 20mg Sildenafil that he takes PRN/rarely.  Does well with this regimen w/o SE.       HTN is well controlled on Procardia XL 30mg; did not tolerate an ACE or an ARB.  Checks his BP intermittently at home.    BP is generally within normal limits.  States that he does have low readings occasionally.  He has been taking his Procardia and Flomax together at night and has been feeling dizzy/lightheaded in the AM.        Hypothyroid - currently taking 100mcg of Synthroid, due for labs      GERD - chronic and stable on Omeprazole 20mg daily; sx's well controlled.  Does not take on a daily basis.        H/O throat (base of tongue) cancer.  Dx'd in 2008.  Followed by Dr. CAMACHO.  All recent reports have been good. Seeing him on a yearly basis now.  All good reports!!     Vit D and B12 def - forgets to take his supplements a great deal.    Flu shot - 10/2020 @ work  COVID - 12/23/20, 1/22/21  Tdap - 2014  PSA - per Urology yearly.    colon - 10/1/2018  Hep A - 2/2019, 8/2019    The following portions of the patient's history were reviewed and updated as appropriate: allergies, current medications, past family history, past medical history, past social history, past surgical history and problem list.    Current Outpatient Medications:   •  levothyroxine (SYNTHROID, LEVOTHROID) 100 MCG tablet, Take 1 tablet by mouth Daily., Disp: 90 tablet, Rfl: 3  •  NIFEdipine XL (PROCARDIA XL) 30 MG 24 hr tablet, Take 1 tablet by mouth Daily., Disp: 90 tablet, Rfl: 3  •  sildenafil (REVATIO) 20 MG tablet, 2-5 PO as needed for sexual activity, Disp: 50 tablet, Rfl: 2  •  tamsulosin (FLOMAX) 0.4 MG capsule 24 hr capsule,  "TAKE 1 CAPSULE BY MOUTH  DAILY, Disp: 90 capsule, Rfl: 3  •  omeprazole (priLOSEC) 20 MG capsule, TAKE 1 CAPSULE BY MOUTH  DAILY, Disp: 90 capsule, Rfl: 3     Review of Systems   Constitutional: Negative for chills, fatigue and fever.   Respiratory: Negative for cough, chest tightness and shortness of breath.    Cardiovascular: Negative for chest pain.   Gastrointestinal: Negative for abdominal pain, diarrhea, nausea and vomiting.   Endocrine: Negative for cold intolerance and heat intolerance.   Musculoskeletal: Negative for arthralgias.   Neurological: Negative for dizziness.       Objective   Physical Exam  Constitutional:       Appearance: He is well-developed.   HENT:      Head: Normocephalic and atraumatic.   Eyes:      Conjunctiva/sclera: Conjunctivae normal.      Pupils: Pupils are equal, round, and reactive to light.   Cardiovascular:      Rate and Rhythm: Normal rate and regular rhythm.      Heart sounds: Normal heart sounds.   Pulmonary:      Effort: Pulmonary effort is normal.      Breath sounds: Normal breath sounds.   Abdominal:      General: Bowel sounds are normal.      Palpations: Abdomen is soft.   Musculoskeletal:         General: Normal range of motion.      Cervical back: Normal range of motion.   Skin:     General: Skin is warm and dry.   Neurological:      Mental Status: He is alert and oriented to person, place, and time.      Deep Tendon Reflexes: Reflexes are normal and symmetric.   Psychiatric:         Behavior: Behavior normal.         Thought Content: Thought content normal.         Judgment: Judgment normal.       Vitals:    09/07/21 0839   BP: 122/82   Cuff Size: Adult   Pulse: 70   Resp: 16   Temp: 97.3 °F (36.3 °C)   TempSrc: Infrared   SpO2: 98%   Weight: 73 kg (161 lb)   Height: 185.4 cm (73\")         Assessment/Plan   Diagnoses and all orders for this visit:    1. Acquired hypothyroidism (Primary)  -     levothyroxine (SYNTHROID, LEVOTHROID) 100 MCG tablet; Take 1 tablet by mouth " Daily.  Dispense: 90 tablet; Refill: 3  -     CBC & Differential; Future  -     Comprehensive Metabolic Panel; Future  -     Lipid Panel; Future  -     TSH; Future  -     Vitamin B12; Future  -     Vitamin D 25 Hydroxy; Future    2. Benign essential hypertension  -     NIFEdipine XL (PROCARDIA XL) 30 MG 24 hr tablet; Take 1 tablet by mouth Daily.  Dispense: 90 tablet; Refill: 3  -     CBC & Differential; Future  -     Comprehensive Metabolic Panel; Future  -     Lipid Panel; Future  -     TSH; Future  -     Vitamin B12; Future  -     Vitamin D 25 Hydroxy; Future    3. Vitamin D deficiency  -     CBC & Differential; Future  -     Comprehensive Metabolic Panel; Future  -     Lipid Panel; Future  -     TSH; Future  -     Vitamin B12; Future  -     Vitamin D 25 Hydroxy; Future    4. Benign prostatic hyperplasia, unspecified whether lower urinary tract symptoms present  -     CBC & Differential; Future  -     Comprehensive Metabolic Panel; Future  -     Lipid Panel; Future  -     TSH; Future  -     Vitamin B12; Future  -     Vitamin D 25 Hydroxy; Future      Will try taking Procardia in the AM and Flomax at night to see if this helps the dizziness in the AM  Labs sent  No med changes  Return in about 6 months (around 3/7/2022) for Annual, collect labs today.

## 2022-03-07 ENCOUNTER — LAB (OUTPATIENT)
Dept: LAB | Facility: HOSPITAL | Age: 67
End: 2022-03-07

## 2022-03-07 DIAGNOSIS — E55.9 VITAMIN D DEFICIENCY: ICD-10-CM

## 2022-03-07 DIAGNOSIS — Z00.00 ROUTINE GENERAL MEDICAL EXAMINATION AT A HEALTH CARE FACILITY: Primary | ICD-10-CM

## 2022-03-07 DIAGNOSIS — E03.9 HYPOTHYROIDISM, UNSPECIFIED TYPE: ICD-10-CM

## 2022-03-07 DIAGNOSIS — I10 BENIGN ESSENTIAL HYPERTENSION: ICD-10-CM

## 2022-03-07 DIAGNOSIS — Z00.00 ROUTINE GENERAL MEDICAL EXAMINATION AT A HEALTH CARE FACILITY: ICD-10-CM

## 2022-03-07 DIAGNOSIS — C14.0 THROAT CANCER: ICD-10-CM

## 2022-03-07 LAB
25(OH)D3 SERPL-MCNC: 15.7 NG/ML (ref 30–100)
ALBUMIN SERPL-MCNC: 4.8 G/DL (ref 3.5–5.2)
ALBUMIN/GLOB SERPL: 2.1 G/DL
ALP SERPL-CCNC: 67 U/L (ref 39–117)
ALT SERPL W P-5'-P-CCNC: 12 U/L (ref 1–41)
ANION GAP SERPL CALCULATED.3IONS-SCNC: 7.6 MMOL/L (ref 5–15)
AST SERPL-CCNC: 18 U/L (ref 1–40)
BASOPHILS # BLD AUTO: 0.1 10*3/MM3 (ref 0–0.2)
BASOPHILS NFR BLD AUTO: 1.6 % (ref 0–1.5)
BILIRUB SERPL-MCNC: 0.7 MG/DL (ref 0–1.2)
BUN SERPL-MCNC: 12 MG/DL (ref 8–23)
BUN/CREAT SERPL: 11.9 (ref 7–25)
CALCIUM SPEC-SCNC: 9.8 MG/DL (ref 8.6–10.5)
CHLORIDE SERPL-SCNC: 103 MMOL/L (ref 98–107)
CHOLEST SERPL-MCNC: 189 MG/DL (ref 0–200)
CO2 SERPL-SCNC: 31.4 MMOL/L (ref 22–29)
CREAT SERPL-MCNC: 1.01 MG/DL (ref 0.76–1.27)
DEPRECATED RDW RBC AUTO: 39.9 FL (ref 37–54)
EGFRCR SERPLBLD CKD-EPI 2021: 83 ML/MIN/1.73
EOSINOPHIL # BLD AUTO: 0.19 10*3/MM3 (ref 0–0.4)
EOSINOPHIL NFR BLD AUTO: 3 % (ref 0.3–6.2)
ERYTHROCYTE [DISTWIDTH] IN BLOOD BY AUTOMATED COUNT: 13.1 % (ref 12.3–15.4)
GLOBULIN UR ELPH-MCNC: 2.3 GM/DL
GLUCOSE SERPL-MCNC: 93 MG/DL (ref 65–99)
HCT VFR BLD AUTO: 49.6 % (ref 37.5–51)
HDLC SERPL-MCNC: 65 MG/DL (ref 40–60)
HGB BLD-MCNC: 16.7 G/DL (ref 13–17.7)
IMM GRANULOCYTES # BLD AUTO: 0.04 10*3/MM3 (ref 0–0.05)
IMM GRANULOCYTES NFR BLD AUTO: 0.6 % (ref 0–0.5)
LDLC SERPL CALC-MCNC: 112 MG/DL (ref 0–100)
LDLC/HDLC SERPL: 1.7 {RATIO}
LYMPHOCYTES # BLD AUTO: 1.47 10*3/MM3 (ref 0.7–3.1)
LYMPHOCYTES NFR BLD AUTO: 23.3 % (ref 19.6–45.3)
MCH RBC QN AUTO: 28.5 PG (ref 26.6–33)
MCHC RBC AUTO-ENTMCNC: 33.7 G/DL (ref 31.5–35.7)
MCV RBC AUTO: 84.6 FL (ref 79–97)
MONOCYTES # BLD AUTO: 0.73 10*3/MM3 (ref 0.1–0.9)
MONOCYTES NFR BLD AUTO: 11.6 % (ref 5–12)
NEUTROPHILS NFR BLD AUTO: 3.78 10*3/MM3 (ref 1.7–7)
NEUTROPHILS NFR BLD AUTO: 59.9 % (ref 42.7–76)
NRBC BLD AUTO-RTO: 0 /100 WBC (ref 0–0.2)
PLATELET # BLD AUTO: 223 10*3/MM3 (ref 140–450)
PMV BLD AUTO: 10.4 FL (ref 6–12)
POTASSIUM SERPL-SCNC: 4 MMOL/L (ref 3.5–5.2)
PROT SERPL-MCNC: 7.1 G/DL (ref 6–8.5)
RBC # BLD AUTO: 5.86 10*6/MM3 (ref 4.14–5.8)
SODIUM SERPL-SCNC: 142 MMOL/L (ref 136–145)
TRIGL SERPL-MCNC: 66 MG/DL (ref 0–150)
TSH SERPL DL<=0.05 MIU/L-ACNC: 2.29 UIU/ML (ref 0.27–4.2)
VIT B12 BLD-MCNC: 282 PG/ML (ref 211–946)
VLDLC SERPL-MCNC: 12 MG/DL (ref 5–40)
WBC NRBC COR # BLD: 6.31 10*3/MM3 (ref 3.4–10.8)

## 2022-03-07 PROCEDURE — 82607 VITAMIN B-12: CPT | Performed by: NURSE PRACTITIONER

## 2022-03-07 PROCEDURE — 85025 COMPLETE CBC W/AUTO DIFF WBC: CPT | Performed by: NURSE PRACTITIONER

## 2022-03-07 PROCEDURE — 80061 LIPID PANEL: CPT | Performed by: NURSE PRACTITIONER

## 2022-03-07 PROCEDURE — 82306 VITAMIN D 25 HYDROXY: CPT | Performed by: NURSE PRACTITIONER

## 2022-03-07 PROCEDURE — 84443 ASSAY THYROID STIM HORMONE: CPT | Performed by: NURSE PRACTITIONER

## 2022-03-07 PROCEDURE — 80053 COMPREHEN METABOLIC PANEL: CPT | Performed by: NURSE PRACTITIONER

## 2022-03-08 ENCOUNTER — OFFICE VISIT (OUTPATIENT)
Dept: INTERNAL MEDICINE | Facility: CLINIC | Age: 67
End: 2022-03-08

## 2022-03-08 VITALS
HEART RATE: 71 BPM | RESPIRATION RATE: 16 BRPM | OXYGEN SATURATION: 97 % | SYSTOLIC BLOOD PRESSURE: 140 MMHG | TEMPERATURE: 96.9 F | BODY MASS INDEX: 21.68 KG/M2 | DIASTOLIC BLOOD PRESSURE: 84 MMHG | HEIGHT: 73 IN | WEIGHT: 163.6 LBS

## 2022-03-08 DIAGNOSIS — E55.9 VITAMIN D DEFICIENCY: ICD-10-CM

## 2022-03-08 DIAGNOSIS — K21.9 GASTROESOPHAGEAL REFLUX DISEASE: ICD-10-CM

## 2022-03-08 DIAGNOSIS — N52.9 IMPOTENCE OF ORGANIC ORIGIN: ICD-10-CM

## 2022-03-08 DIAGNOSIS — Z00.00 ANNUAL PHYSICAL EXAM: Primary | ICD-10-CM

## 2022-03-08 DIAGNOSIS — R31.21 ASYMPTOMATIC MICROSCOPIC HEMATURIA: ICD-10-CM

## 2022-03-08 DIAGNOSIS — I10 BENIGN ESSENTIAL HYPERTENSION: ICD-10-CM

## 2022-03-08 DIAGNOSIS — N40.0 BENIGN PROSTATIC HYPERPLASIA, UNSPECIFIED WHETHER LOWER URINARY TRACT SYMPTOMS PRESENT: ICD-10-CM

## 2022-03-08 DIAGNOSIS — E03.9 ACQUIRED HYPOTHYROIDISM: ICD-10-CM

## 2022-03-08 LAB
BILIRUB BLD-MCNC: NEGATIVE MG/DL
CLARITY, POC: CLEAR
COLOR UR: ABNORMAL
EXPIRATION DATE: ABNORMAL
GLUCOSE UR STRIP-MCNC: NEGATIVE MG/DL
KETONES UR QL: NEGATIVE
LEUKOCYTE EST, POC: NEGATIVE
Lab: ABNORMAL
NITRITE UR-MCNC: NEGATIVE MG/ML
PH UR: 6 [PH] (ref 5–8)
PROT UR STRIP-MCNC: ABNORMAL MG/DL
RBC # UR STRIP: ABNORMAL /UL
SP GR UR: 1.02 (ref 1–1.03)
UROBILINOGEN UR QL: NORMAL

## 2022-03-08 PROCEDURE — 81003 URINALYSIS AUTO W/O SCOPE: CPT | Performed by: NURSE PRACTITIONER

## 2022-03-08 PROCEDURE — 99396 PREV VISIT EST AGE 40-64: CPT | Performed by: NURSE PRACTITIONER

## 2022-03-08 RX ORDER — SILDENAFIL CITRATE 20 MG/1
TABLET ORAL
Qty: 50 TABLET | Refills: 2 | Status: SHIPPED | OUTPATIENT
Start: 2022-03-08 | End: 2022-06-08 | Stop reason: SDUPTHER

## 2022-03-08 RX ORDER — CHOLECALCIFEROL (VITAMIN D3) 1250 MCG
50000 CAPSULE ORAL
Qty: 8 CAPSULE | Refills: 0 | Status: SHIPPED | OUTPATIENT
Start: 2022-03-08 | End: 2022-04-27

## 2022-03-08 RX ORDER — NIFEDIPINE 30 MG/1
30 TABLET, EXTENDED RELEASE ORAL DAILY
Qty: 90 TABLET | Refills: 3 | Status: SHIPPED | OUTPATIENT
Start: 2022-03-08 | End: 2022-09-23

## 2022-03-08 RX ORDER — OMEPRAZOLE 20 MG/1
20 CAPSULE, DELAYED RELEASE ORAL DAILY
Qty: 90 CAPSULE | Refills: 3 | Status: SHIPPED | OUTPATIENT
Start: 2022-03-08

## 2022-03-08 RX ORDER — LEVOTHYROXINE SODIUM 0.1 MG/1
100 TABLET ORAL DAILY
Qty: 90 TABLET | Refills: 3 | Status: SHIPPED | OUTPATIENT
Start: 2022-03-08 | End: 2022-12-02

## 2022-03-08 NOTE — PROGRESS NOTES
Subjective   Boone Riley is a 66 y.o. male    Chief Complaint   Patient presents with   • Annual Exam     Labs done yesterda   • Hypertension   • Heartburn   • Med Refill   • Erectile Dysfunction   • Hypothyroidism     History of Present Illness     Here for annual exam    BPH - Seeing Urology ([facility]).  Has him on the Flomax.  Takes PRN meds for ED.  Was given 20mg Sildenafil that he takes PRN/rarely.  Does well with this regimen w/o SE.  Has f/u later this week.  May be doing a Uro-lift     HTN is well controlled on Procardia XL 30mg; did not tolerate an ACE or an ARB.  Checks his BP intermittently at home.    BP is generally within normal limits.  States that he does have low readings occasionally.  He has not had his med this AM      Hypothyroid - currently taking 100mcg of Synthroid  Lab Results   Component Value Date    TSH 2.290 03/07/2022     GERD - chronic and stable on Omeprazole 20mg daily; sx's well controlled.  Does not take on a daily basis.        H/O throat (base of tongue) cancer.  Dx'd in 2008.  Followed by Dr. CAMACHO.  All recent reports have been good. Seeing him on a yearly basis now.  All good reports!!     Vit D and B12 def - chronic/levels are low.  Not currently taking any supplements     Flu shot - 10/2021 @ work  COVID - 12/23/20, 1/22/21  Tdap - 2014  PSA - per Urology yearly.    colon - 10/1/2018  Hep A - 2/2019, 8/2019    Diet    Exercise    Social History     Tobacco Use   • Smoking status: Former Smoker   • Smokeless tobacco: Never Used   Substance Use Topics   • Alcohol use: No   • Drug use: No         The following portions of the patient's history were reviewed and updated as appropriate: allergies, current medications, past family history, past medical history, past social history, past surgical history and problem list.    Current Outpatient Medications:   •  levothyroxine (SYNTHROID, LEVOTHROID) 100 MCG tablet, Take 1 tablet by mouth Daily., Disp: 90 tablet,  Rfl: 3  •  NIFEdipine XL (PROCARDIA XL) 30 MG 24 hr tablet, Take 1 tablet by mouth Daily., Disp: 90 tablet, Rfl: 3  •  omeprazole (priLOSEC) 20 MG capsule, Take 1 capsule by mouth Daily., Disp: 90 capsule, Rfl: 3  •  sildenafil (REVATIO) 20 MG tablet, 2-5 PO as needed for sexual activity, Disp: 50 tablet, Rfl: 2  •  Cholecalciferol (Vitamin D3) 1.25 MG (91126 UT) capsule, Take 1 capsule by mouth Every 7 (Seven) Days for 8 doses., Disp: 8 capsule, Rfl: 0     Review of Systems   Constitutional: Negative for appetite change, chills, fatigue, fever and unexpected weight change.   HENT: Negative for congestion, ear pain, nosebleeds, rhinorrhea and tinnitus.    Eyes: Negative for pain.   Respiratory: Negative for cough, chest tightness and shortness of breath.    Cardiovascular: Negative for chest pain, palpitations and leg swelling.   Gastrointestinal: Negative for abdominal distention, abdominal pain, blood in stool, constipation, diarrhea, nausea and vomiting.   Endocrine: Negative for cold intolerance, heat intolerance, polydipsia, polyphagia and polyuria.   Genitourinary: Negative for dysuria, flank pain, frequency, hematuria and urgency.   Musculoskeletal: Negative for arthralgias, back pain, gait problem, joint swelling, myalgias and neck pain.   Skin: Negative for color change, pallor, rash and wound.   Allergic/Immunologic: Negative for environmental allergies and food allergies.   Neurological: Negative for dizziness, syncope, weakness, light-headedness, numbness and headaches.   Hematological: Negative for adenopathy. Does not bruise/bleed easily.   Psychiatric/Behavioral: Negative for behavioral problems and suicidal ideas. The patient is not nervous/anxious.        Objective   Physical Exam  Constitutional:       Appearance: He is well-developed and normal weight.   HENT:      Head: Normocephalic and atraumatic.      Right Ear: External ear normal.      Left Ear: External ear normal.      Nose: Nose normal.  "     Mouth/Throat:      Mouth: Mucous membranes are moist.      Pharynx: Oropharynx is clear.   Eyes:      General: Lids are normal.      Conjunctiva/sclera: Conjunctivae normal.      Pupils: Pupils are equal, round, and reactive to light.   Neck:      Vascular: No carotid bruit.   Cardiovascular:      Rate and Rhythm: Normal rate and regular rhythm.      Pulses: Normal pulses.      Heart sounds: Normal heart sounds. No murmur heard.  Pulmonary:      Effort: Pulmonary effort is normal.      Breath sounds: Normal breath sounds.   Abdominal:      General: Bowel sounds are normal.      Palpations: Abdomen is soft. There is no hepatomegaly or splenomegaly.      Hernia: No hernia is present.   Musculoskeletal:         General: Normal range of motion.      Cervical back: Normal range of motion and neck supple.   Lymphadenopathy:      Cervical: No cervical adenopathy.   Skin:     General: Skin is warm and dry.      Capillary Refill: Capillary refill takes less than 2 seconds.   Neurological:      Mental Status: He is alert and oriented to person, place, and time.      Deep Tendon Reflexes: Reflexes are normal and symmetric.   Psychiatric:         Mood and Affect: Mood normal.         Behavior: Behavior normal.         Thought Content: Thought content normal.         Judgment: Judgment normal.       Vitals:    03/08/22 0825   BP: 140/84   Cuff Size: Adult   Pulse: 71   Resp: 16   Temp: 96.9 °F (36.1 °C)   TempSrc: Infrared   SpO2: 97%   Weight: 74.2 kg (163 lb 9.6 oz)   Height: 185.4 cm (73\")         Assessment/Plan   Diagnoses and all orders for this visit:    1. Annual physical exam (Primary)  -     POCT urinalysis dipstick, automated    2. Acquired hypothyroidism  -     levothyroxine (SYNTHROID, LEVOTHROID) 100 MCG tablet; Take 1 tablet by mouth Daily.  Dispense: 90 tablet; Refill: 3    3. Benign essential hypertension  -     NIFEdipine XL (PROCARDIA XL) 30 MG 24 hr tablet; Take 1 tablet by mouth Daily.  Dispense: 90 " tablet; Refill: 3    4. Gastroesophageal reflux disease  -     omeprazole (priLOSEC) 20 MG capsule; Take 1 capsule by mouth Daily.  Dispense: 90 capsule; Refill: 3    5. Impotence of organic origin  -     sildenafil (REVATIO) 20 MG tablet; 2-5 PO as needed for sexual activity  Dispense: 50 tablet; Refill: 2    6. Benign prostatic hyperplasia, unspecified whether lower urinary tract symptoms present    7. Vitamin D deficiency  -     Cholecalciferol (Vitamin D3) 1.25 MG (44928 UT) capsule; Take 1 capsule by mouth Every 7 (Seven) Days for 8 doses.  Dispense: 8 capsule; Refill: 0    8. Asymptomatic microscopic hematuria  -     Urine Culture - , Urine, Clean Catch; Future      Labs reviewed  Will start Rx strength D3 and then switch to 5000 units D3 daily  Meds refilled w/o changes  UA + for protein and blood, will send for culture and discuss with Urologist when he sees them later this week  Counseling - diet and exercise  Return in about 6 months (around 9/8/2022) for f/u.

## 2022-03-09 ENCOUNTER — LAB (OUTPATIENT)
Dept: LAB | Facility: HOSPITAL | Age: 67
End: 2022-03-09

## 2022-03-09 PROCEDURE — 87086 URINE CULTURE/COLONY COUNT: CPT | Performed by: NURSE PRACTITIONER

## 2022-03-10 LAB — BACTERIA SPEC AEROBE CULT: NO GROWTH

## 2022-06-08 DIAGNOSIS — N52.9 IMPOTENCE OF ORGANIC ORIGIN: ICD-10-CM

## 2022-06-08 NOTE — TELEPHONE ENCOUNTER
Caller: Boone Riley    Relationship: Self    Best call back number: [phone]    Requested Prescriptions:   Requested Prescriptions     Pending Prescriptions Disp Refills   • sildenafil (REVATIO) 20 MG tablet 50 tablet 2     Si-5 PO as needed for sexual activity        Pharmacy where request should be sent: [id number] - [place] [address]  [phone]  - [phone] FX     Additional details provided by patient: PATIENT IS COMPLETELY OUT     Does the patient have less than a 3 day supply:  [x] Yes  [] No    Danelle Russell Rep   22 11:12 EDT

## 2022-06-09 RX ORDER — SILDENAFIL CITRATE 20 MG/1
TABLET ORAL
Qty: 50 TABLET | Refills: 2 | Status: SHIPPED | OUTPATIENT
Start: 2022-06-09

## 2022-06-09 NOTE — TELEPHONE ENCOUNTER
Rx Refill Note  Requested Prescriptions     Pending Prescriptions Disp Refills   • sildenafil (REVATIO) 20 MG tablet 50 tablet 2     Si-5 PO as needed for sexual activity      Last office visit with prescribing clinician: 3/8/2022      Next office visit with prescribing clinician: 2022            Josey Tripp MA  22, 14:05 EDT

## 2022-09-23 ENCOUNTER — TELEMEDICINE (OUTPATIENT)
Dept: INTERNAL MEDICINE | Facility: CLINIC | Age: 67
End: 2022-09-23

## 2022-09-23 DIAGNOSIS — N40.0 BENIGN PROSTATIC HYPERPLASIA, UNSPECIFIED WHETHER LOWER URINARY TRACT SYMPTOMS PRESENT: ICD-10-CM

## 2022-09-23 DIAGNOSIS — I10 BENIGN ESSENTIAL HYPERTENSION: Primary | ICD-10-CM

## 2022-09-23 DIAGNOSIS — Z23 NEED FOR PNEUMOCOCCAL VACCINATION: ICD-10-CM

## 2022-09-23 DIAGNOSIS — E55.9 VITAMIN D DEFICIENCY: ICD-10-CM

## 2022-09-23 DIAGNOSIS — E03.9 HYPOTHYROIDISM, UNSPECIFIED TYPE: ICD-10-CM

## 2022-09-23 DIAGNOSIS — C14.0 THROAT CANCER: ICD-10-CM

## 2022-09-23 PROCEDURE — 99214 OFFICE O/P EST MOD 30 MIN: CPT | Performed by: NURSE PRACTITIONER

## 2022-09-23 RX ORDER — NIFEDIPINE 10 MG/1
10 CAPSULE ORAL 2 TIMES DAILY
Qty: 60 CAPSULE | Refills: 2 | Status: SHIPPED | OUTPATIENT
Start: 2022-09-23 | End: 2023-03-20

## 2022-09-23 NOTE — PROGRESS NOTES
Subjective   Boone Riley is a 67 y.o. male    Chief Complaint   Patient presents with   • Hypertension   • Hypothyroidism   • Heartburn   • Cancer     Throat/base of tongue     History of Present Illness     This was an audio and video enabled telemedicine encounter.    You have chosen to receive care through a telehealth visit.  Do you consent to use a video/audio connection for your medical care today? Yes    My location: my home  Pt location: pt's home    BPH - Seeing Urology ([place] now [facility]).  Takes PRN meds for ED.  Was given 20mg Sildenafil that he takes PRN/rarely. He did have a Urolift 7/20/2022. He has been able to d/c the flomax     HTN is well controlled on Procardia XL 30mg; did not tolerate an ACE or an ARB.  Checks his BP intermittently at home.    BP is generally within normal limits.  States that at times he does have low readings at times, but if he goes w/o this med his BP does go up.      Hypothyroid - currently taking 100mcg of Synthroid    GERD - chronic and stable on Omeprazole 20mg daily; sx's well controlled.  Does not take on a daily basis.        H/O throat (base of tongue) cancer.  Dx'd in 2008.  Followed by Dr. CAMACHO.  All recent reports have been good. Seeing him on a yearly basis now.  All good reports!!     Vit D and B12 def - chronic/levels are low.  Not currently taking any supplements     Flu shot - 10/2021 @ work  COVID - 12/23/20, 1/22/21  Tdap - 2014  PSA - per Urology yearly.    colon - 10/1/2018  Hep A - 2/2019, 8/2019         The following portions of the patient's history were reviewed and updated as appropriate: allergies, current medications, past family history, past medical history, past social history, past surgical history and problem list.    Current Outpatient Medications:   •  levothyroxine (SYNTHROID, LEVOTHROID) 100 MCG tablet, Take 1 tablet by mouth Daily., Disp: 90 tablet, Rfl: 3  •  NIFEdipine (Procardia) 10 MG capsule, Take 1 capsule by mouth 2 (Two)  Times a Day., Disp: 60 capsule, Rfl: 2  •  omeprazole (priLOSEC) 20 MG capsule, Take 1 capsule by mouth Daily., Disp: 90 capsule, Rfl: 3  •  sildenafil (REVATIO) 20 MG tablet, 2-5 PO as needed for sexual activity, Disp: 50 tablet, Rfl: 2     Review of Systems   Constitutional: Negative for chills, fatigue and fever.   Respiratory: Negative for cough, chest tightness and shortness of breath.    Cardiovascular: Negative for chest pain.   Gastrointestinal: Negative for abdominal pain, diarrhea, nausea and vomiting.   Endocrine: Negative for cold intolerance and heat intolerance.   Musculoskeletal: Negative for arthralgias.   Neurological: Negative for dizziness.       Objective   Physical Exam  Constitutional:       Appearance: Normal appearance.   HENT:      Head: Normocephalic.      Nose: Nose normal.      Mouth/Throat:      Mouth: Mucous membranes are moist.      Pharynx: Oropharynx is clear.   Eyes:      Pupils: Pupils are equal, round, and reactive to light.   Pulmonary:      Effort: Pulmonary effort is normal.   Musculoskeletal:         General: Normal range of motion.      Cervical back: Normal range of motion.   Neurological:      Mental Status: He is alert and oriented to person, place, and time.   Psychiatric:         Behavior: Behavior normal.         Thought Content: Thought content normal.         Judgment: Judgment normal.       There were no vitals filed for this visit.      Assessment & Plan   Diagnoses and all orders for this visit:    1. Benign essential hypertension (Primary)  -     NIFEdipine (Procardia) 10 MG capsule; Take 1 capsule by mouth 2 (Two) Times a Day.  Dispense: 60 capsule; Refill: 2  -     CBC & Differential; Future  -     Comprehensive Metabolic Panel; Future  -     Lipid Panel; Future  -     TSH; Future  -     Vitamin B12; Future  -     Vitamin D 25 Hydroxy; Future    2. Hypothyroidism, unspecified type  -     CBC & Differential; Future  -     Comprehensive Metabolic Panel; Future  -      Lipid Panel; Future  -     TSH; Future  -     Vitamin B12; Future  -     Vitamin D 25 Hydroxy; Future    3. Benign prostatic hyperplasia, unspecified whether lower urinary tract symptoms present  -     CBC & Differential; Future  -     Comprehensive Metabolic Panel; Future  -     Lipid Panel; Future  -     TSH; Future  -     Vitamin B12; Future  -     Vitamin D 25 Hydroxy; Future    4. Throat cancer (HCC)  -     CBC & Differential; Future  -     Comprehensive Metabolic Panel; Future  -     Lipid Panel; Future  -     TSH; Future  -     Vitamin B12; Future  -     Vitamin D 25 Hydroxy; Future    5. Vitamin D deficiency  -     CBC & Differential; Future  -     Comprehensive Metabolic Panel; Future  -     Lipid Panel; Future  -     TSH; Future  -     Vitamin B12; Future  -     Vitamin D 25 Hydroxy; Future    6. Need for pneumococcal vaccination  -     Pneumococcal Conjugate Vaccine 20-Valent (PCV20); Future      We will change Procardia from XL to regular release, 10 mg BID  Keep BP log  He will come by the office fasting for labs  We will also update Prevnar 20  Return in about 6 weeks (around 11/4/2022) for f/u on BP.

## 2022-10-24 ENCOUNTER — LAB (OUTPATIENT)
Dept: LAB | Facility: HOSPITAL | Age: 67
End: 2022-10-24

## 2022-10-24 DIAGNOSIS — E03.9 HYPOTHYROIDISM, UNSPECIFIED TYPE: ICD-10-CM

## 2022-10-24 DIAGNOSIS — I10 BENIGN ESSENTIAL HYPERTENSION: ICD-10-CM

## 2022-10-24 DIAGNOSIS — E55.9 VITAMIN D DEFICIENCY: ICD-10-CM

## 2022-10-24 DIAGNOSIS — C14.0 THROAT CANCER: ICD-10-CM

## 2022-10-24 DIAGNOSIS — N40.0 BENIGN PROSTATIC HYPERPLASIA, UNSPECIFIED WHETHER LOWER URINARY TRACT SYMPTOMS PRESENT: ICD-10-CM

## 2022-10-24 LAB
25(OH)D3 SERPL-MCNC: 52.7 NG/ML (ref 30–100)
ALBUMIN SERPL-MCNC: 4.5 G/DL (ref 3.5–5.2)
ALBUMIN/GLOB SERPL: 2.1 G/DL
ALP SERPL-CCNC: 59 U/L (ref 39–117)
ALT SERPL W P-5'-P-CCNC: 8 U/L (ref 1–41)
ANION GAP SERPL CALCULATED.3IONS-SCNC: 10.3 MMOL/L (ref 5–15)
AST SERPL-CCNC: 18 U/L (ref 1–40)
BASOPHILS # BLD AUTO: 0.07 10*3/MM3 (ref 0–0.2)
BASOPHILS NFR BLD AUTO: 1.1 % (ref 0–1.5)
BILIRUB SERPL-MCNC: 0.6 MG/DL (ref 0–1.2)
BUN SERPL-MCNC: 18 MG/DL (ref 8–23)
BUN/CREAT SERPL: 17.6 (ref 7–25)
CALCIUM SPEC-SCNC: 9.4 MG/DL (ref 8.6–10.5)
CHLORIDE SERPL-SCNC: 101 MMOL/L (ref 98–107)
CHOLEST SERPL-MCNC: 201 MG/DL (ref 0–200)
CO2 SERPL-SCNC: 30.7 MMOL/L (ref 22–29)
CREAT SERPL-MCNC: 1.02 MG/DL (ref 0.76–1.27)
DEPRECATED RDW RBC AUTO: 43.8 FL (ref 37–54)
EGFRCR SERPLBLD CKD-EPI 2021: 81.6 ML/MIN/1.73
EOSINOPHIL # BLD AUTO: 0.12 10*3/MM3 (ref 0–0.4)
EOSINOPHIL NFR BLD AUTO: 1.9 % (ref 0.3–6.2)
ERYTHROCYTE [DISTWIDTH] IN BLOOD BY AUTOMATED COUNT: 13.9 % (ref 12.3–15.4)
GLOBULIN UR ELPH-MCNC: 2.1 GM/DL
GLUCOSE SERPL-MCNC: 64 MG/DL (ref 65–99)
HCT VFR BLD AUTO: 43.5 % (ref 37.5–51)
HDLC SERPL-MCNC: 63 MG/DL (ref 40–60)
HGB BLD-MCNC: 14.4 G/DL (ref 13–17.7)
IMM GRANULOCYTES # BLD AUTO: 0.03 10*3/MM3 (ref 0–0.05)
IMM GRANULOCYTES NFR BLD AUTO: 0.5 % (ref 0–0.5)
LDLC SERPL CALC-MCNC: 117 MG/DL (ref 0–100)
LDLC/HDLC SERPL: 1.81 {RATIO}
LYMPHOCYTES # BLD AUTO: 1.29 10*3/MM3 (ref 0.7–3.1)
LYMPHOCYTES NFR BLD AUTO: 20.5 % (ref 19.6–45.3)
MCH RBC QN AUTO: 28.5 PG (ref 26.6–33)
MCHC RBC AUTO-ENTMCNC: 33.1 G/DL (ref 31.5–35.7)
MCV RBC AUTO: 86.1 FL (ref 79–97)
MONOCYTES # BLD AUTO: 0.67 10*3/MM3 (ref 0.1–0.9)
MONOCYTES NFR BLD AUTO: 10.6 % (ref 5–12)
NEUTROPHILS NFR BLD AUTO: 4.12 10*3/MM3 (ref 1.7–7)
NEUTROPHILS NFR BLD AUTO: 65.4 % (ref 42.7–76)
NRBC BLD AUTO-RTO: 0 /100 WBC (ref 0–0.2)
PLATELET # BLD AUTO: 220 10*3/MM3 (ref 140–450)
PMV BLD AUTO: 10.6 FL (ref 6–12)
POTASSIUM SERPL-SCNC: 4.3 MMOL/L (ref 3.5–5.2)
PROT SERPL-MCNC: 6.6 G/DL (ref 6–8.5)
RBC # BLD AUTO: 5.05 10*6/MM3 (ref 4.14–5.8)
SODIUM SERPL-SCNC: 142 MMOL/L (ref 136–145)
TRIGL SERPL-MCNC: 120 MG/DL (ref 0–150)
TSH SERPL DL<=0.05 MIU/L-ACNC: 7.18 UIU/ML (ref 0.27–4.2)
VIT B12 BLD-MCNC: 830 PG/ML (ref 211–946)
VLDLC SERPL-MCNC: 21 MG/DL (ref 5–40)
WBC NRBC COR # BLD: 6.3 10*3/MM3 (ref 3.4–10.8)

## 2022-10-24 PROCEDURE — 84443 ASSAY THYROID STIM HORMONE: CPT | Performed by: NURSE PRACTITIONER

## 2022-10-24 PROCEDURE — 82306 VITAMIN D 25 HYDROXY: CPT | Performed by: NURSE PRACTITIONER

## 2022-10-24 PROCEDURE — 82607 VITAMIN B-12: CPT | Performed by: NURSE PRACTITIONER

## 2022-10-24 PROCEDURE — 85025 COMPLETE CBC W/AUTO DIFF WBC: CPT | Performed by: NURSE PRACTITIONER

## 2022-10-24 PROCEDURE — 80053 COMPREHEN METABOLIC PANEL: CPT | Performed by: NURSE PRACTITIONER

## 2022-10-24 PROCEDURE — 80061 LIPID PANEL: CPT | Performed by: NURSE PRACTITIONER

## 2022-12-02 DIAGNOSIS — E03.9 ACQUIRED HYPOTHYROIDISM: Primary | ICD-10-CM

## 2022-12-02 RX ORDER — LEVOTHYROXINE SODIUM 112 UG/1
112 TABLET ORAL DAILY
Qty: 90 TABLET | Refills: 1 | Status: SHIPPED | OUTPATIENT
Start: 2022-12-02

## 2023-02-03 DIAGNOSIS — K21.9 GASTROESOPHAGEAL REFLUX DISEASE: ICD-10-CM

## 2023-02-03 RX ORDER — OMEPRAZOLE 20 MG/1
20 CAPSULE, DELAYED RELEASE ORAL DAILY
Qty: 90 CAPSULE | Refills: 3 | OUTPATIENT
Start: 2023-02-03

## 2023-02-03 NOTE — TELEPHONE ENCOUNTER
LV: 09/23/22-telehealth  LV: 3/8/22-annual  NV: 3/20/2023    Called and spoke with patient, scheduled him a follow-up appointment. He stated that he didn't need a refill at this time on this prescription.

## 2023-03-20 ENCOUNTER — OFFICE VISIT (OUTPATIENT)
Dept: INTERNAL MEDICINE | Facility: CLINIC | Age: 68
End: 2023-03-20
Payer: COMMERCIAL

## 2023-03-20 VITALS
DIASTOLIC BLOOD PRESSURE: 88 MMHG | HEART RATE: 79 BPM | BODY MASS INDEX: 21.15 KG/M2 | WEIGHT: 164.8 LBS | TEMPERATURE: 97.5 F | RESPIRATION RATE: 18 BRPM | OXYGEN SATURATION: 98 % | HEIGHT: 74 IN | SYSTOLIC BLOOD PRESSURE: 142 MMHG

## 2023-03-20 DIAGNOSIS — Z23 NEED FOR PNEUMOCOCCAL VACCINATION: ICD-10-CM

## 2023-03-20 DIAGNOSIS — Z00.00 ROUTINE GENERAL MEDICAL EXAMINATION AT A HEALTH CARE FACILITY: Primary | ICD-10-CM

## 2023-03-20 DIAGNOSIS — Z12.5 SCREENING FOR PROSTATE CANCER: ICD-10-CM

## 2023-03-20 DIAGNOSIS — E03.9 HYPOTHYROIDISM, UNSPECIFIED TYPE: ICD-10-CM

## 2023-03-20 DIAGNOSIS — Z85.819 HISTORY OF THROAT CANCER: ICD-10-CM

## 2023-03-20 DIAGNOSIS — N40.0 BENIGN PROSTATIC HYPERPLASIA, UNSPECIFIED WHETHER LOWER URINARY TRACT SYMPTOMS PRESENT: ICD-10-CM

## 2023-03-20 DIAGNOSIS — E55.9 VITAMIN D DEFICIENCY: ICD-10-CM

## 2023-03-20 DIAGNOSIS — I10 BENIGN ESSENTIAL HYPERTENSION: ICD-10-CM

## 2023-03-20 PROCEDURE — 99397 PER PM REEVAL EST PAT 65+ YR: CPT | Performed by: NURSE PRACTITIONER

## 2023-03-20 PROCEDURE — 90677 PCV20 VACCINE IM: CPT | Performed by: NURSE PRACTITIONER

## 2023-03-20 PROCEDURE — 90471 IMMUNIZATION ADMIN: CPT | Performed by: NURSE PRACTITIONER

## 2023-03-20 RX ORDER — NIFEDIPINE 30 MG/1
30 TABLET, EXTENDED RELEASE ORAL DAILY
Qty: 90 TABLET | Refills: 1 | Status: SHIPPED | OUTPATIENT
Start: 2023-03-20

## 2023-03-20 NOTE — PROGRESS NOTES
Subjective   Boone Riley is a 67 y.o. male    Chief Complaint   Patient presents with   • Annual Exam     History of Present Illness     Here for annual exam    BPH - Seeing Urology ([facility]ario, now [facility]).  Takes PRN meds for ED.  Was given 20mg Sildenafil that he takes PRN/rarely. He did have a Urolift 7/20/2022. He has been able to d/c the flomax     HTN is well controlled on Procardia XL 30mg; did not tolerate an ACE or an ARB.  Checks his BP intermittently at home.  States that blood pressure does run much lower at home!      Hypothyroid - should be taking 112 mcg of Synthroid but he thinks he may only be taking 100 mcg and did not increase in Sept when we called with labs.       GERD - chronic and stable on Omeprazole 20mg daily; sx's well controlled.  Does not take on a daily basis.        H/O throat (base of tongue) cancer.  Dx'd in 2008.  Followed by Dr. CAMACHO.  All recent reports have been good. Seeing him on a yearly basis now.  All good reports!!     Vit D and B12 def - chronic/levels are low.  Not currently taking any supplements     Flu shot - 10/2022 @ work  COVID - 12/23/20, 1/22/21  Tdap - 2014  PSA - per Urology yearly.    colon - 10/1/2018  Hep A - 2/2019, 8/2019    Diet- generally healthy    Exercise- moderately active    Social History     Tobacco Use   • Smoking status: Never   • Smokeless tobacco: Never   Vaping Use   • Vaping Use: Never used   Substance Use Topics   • Alcohol use: No   • Drug use: No         The following portions of the patient's history were reviewed and updated as appropriate: allergies, current medications, past family history, past medical history, past social history, past surgical history and problem list.    Current Outpatient Medications:   •  levothyroxine (Synthroid) 112 MCG tablet, Take 1 tablet by mouth Daily., Disp: 90 tablet, Rfl: 1  •  omeprazole (priLOSEC) 20 MG capsule, Take 1 capsule by mouth Daily., Disp: 90 capsule, Rfl: 3  •  sildenafil (REVATIO) 20  MG tablet, 2-5 PO as needed for sexual activity, Disp: 50 tablet, Rfl: 2  •  NIFEdipine XL (Procardia XL) 30 MG 24 hr tablet, Take 1 tablet by mouth Daily., Disp: 90 tablet, Rfl: 1     Review of Systems   Constitutional: Negative for appetite change, chills, fatigue, fever and unexpected weight change.   HENT: Negative for congestion, ear pain, nosebleeds, rhinorrhea and tinnitus.    Eyes: Negative for pain.   Respiratory: Negative for cough, chest tightness and shortness of breath.    Cardiovascular: Negative for chest pain, palpitations and leg swelling.   Gastrointestinal: Negative for abdominal distention, abdominal pain, blood in stool, constipation, diarrhea, nausea and vomiting.   Endocrine: Negative for cold intolerance, heat intolerance, polydipsia, polyphagia and polyuria.   Genitourinary: Negative for dysuria, flank pain, frequency, hematuria and urgency.   Musculoskeletal: Negative for arthralgias, back pain, gait problem, joint swelling, myalgias and neck pain.   Skin: Negative for color change, pallor, rash and wound.   Allergic/Immunologic: Negative for environmental allergies and food allergies.   Neurological: Negative for dizziness, syncope, weakness, light-headedness, numbness and headaches.   Hematological: Negative for adenopathy. Does not bruise/bleed easily.   Psychiatric/Behavioral: Negative for behavioral problems and suicidal ideas. The patient is not nervous/anxious.        Objective   Physical Exam  Constitutional:       Appearance: He is well-developed.   HENT:      Head: Normocephalic and atraumatic.   Eyes:      Conjunctiva/sclera: Conjunctivae normal.      Pupils: Pupils are equal, round, and reactive to light.   Cardiovascular:      Rate and Rhythm: Normal rate and regular rhythm.      Heart sounds: Normal heart sounds.   Pulmonary:      Effort: Pulmonary effort is normal.      Breath sounds: Normal breath sounds.   Abdominal:      General: Bowel sounds are normal.      Palpations:  "Abdomen is soft.   Musculoskeletal:         General: Normal range of motion.      Cervical back: Normal range of motion.   Skin:     General: Skin is warm and dry.   Neurological:      Mental Status: He is alert and oriented to person, place, and time.      Deep Tendon Reflexes: Reflexes are normal and symmetric.   Psychiatric:         Behavior: Behavior normal.         Thought Content: Thought content normal.         Judgment: Judgment normal.       Vitals:    03/20/23 1035   BP: 142/88   BP Location: Right arm   Patient Position: Sitting   Cuff Size: Adult   Pulse: 79   Resp: 18   Temp: 97.5 °F (36.4 °C)   TempSrc: Infrared   SpO2: 98%   Weight: 74.8 kg (164 lb 12.8 oz)   Height: 186.7 cm (73.5\")         Assessment & Plan   Diagnoses and all orders for this visit:    1. Routine general medical examination at a health care facility (Primary)  -     CBC & Differential; Future  -     Comprehensive Metabolic Panel; Future  -     Lipid Panel; Future  -     TSH; Future  -     Vitamin B12; Future  -     Vitamin D,25-Hydroxy; Future  -     PSA Screen; Future    2. Benign essential hypertension  -     NIFEdipine XL (Procardia XL) 30 MG 24 hr tablet; Take 1 tablet by mouth Daily.  Dispense: 90 tablet; Refill: 1  -     CBC & Differential; Future  -     Comprehensive Metabolic Panel; Future  -     Lipid Panel; Future  -     TSH; Future  -     Vitamin B12; Future  -     Vitamin D,25-Hydroxy; Future  -     PSA Screen; Future    3. Need for pneumococcal vaccination  -     Pneumococcal Conjugate Vaccine 20-Valent (PCV20)    4. Hypothyroidism, unspecified type  -     CBC & Differential; Future  -     Comprehensive Metabolic Panel; Future  -     Lipid Panel; Future  -     TSH; Future  -     Vitamin B12; Future  -     Vitamin D,25-Hydroxy; Future  -     PSA Screen; Future  -     T4, Free; Future    5. Benign prostatic hyperplasia, unspecified whether lower urinary tract symptoms present  -     CBC & Differential; Future  -     " Comprehensive Metabolic Panel; Future  -     Lipid Panel; Future  -     TSH; Future  -     Vitamin B12; Future  -     Vitamin D,25-Hydroxy; Future  -     PSA Screen; Future    6. History of throat cancer  -     CBC & Differential; Future  -     Comprehensive Metabolic Panel; Future  -     Lipid Panel; Future  -     TSH; Future  -     Vitamin B12; Future  -     Vitamin D,25-Hydroxy; Future  -     PSA Screen; Future    7. Vitamin D deficiency  -     CBC & Differential; Future  -     Comprehensive Metabolic Panel; Future  -     Lipid Panel; Future  -     TSH; Future  -     Vitamin B12; Future  -     Vitamin D,25-Hydroxy; Future  -     PSA Screen; Future    8. Screening for prostate cancer  -     CBC & Differential; Future  -     Comprehensive Metabolic Panel; Future  -     Lipid Panel; Future  -     TSH; Future  -     Vitamin B12; Future  -     Vitamin D,25-Hydroxy; Future  -     PSA Screen; Future      Meds refilled without change  He is not fasting, will return to clinic for labs  Prevnar 20 updated  Counseling-diet and exercise  Return in about 6 months (around 9/20/2023) for f/u, collect labs today.

## 2023-03-21 ENCOUNTER — LAB (OUTPATIENT)
Dept: INTERNAL MEDICINE | Facility: CLINIC | Age: 68
End: 2023-03-21
Payer: COMMERCIAL

## 2023-03-21 DIAGNOSIS — E55.9 VITAMIN D DEFICIENCY: ICD-10-CM

## 2023-03-21 DIAGNOSIS — Z85.819 HISTORY OF THROAT CANCER: ICD-10-CM

## 2023-03-21 DIAGNOSIS — E03.9 HYPOTHYROIDISM, UNSPECIFIED TYPE: ICD-10-CM

## 2023-03-21 DIAGNOSIS — N40.0 BENIGN PROSTATIC HYPERPLASIA, UNSPECIFIED WHETHER LOWER URINARY TRACT SYMPTOMS PRESENT: ICD-10-CM

## 2023-03-21 DIAGNOSIS — Z00.00 ROUTINE GENERAL MEDICAL EXAMINATION AT A HEALTH CARE FACILITY: ICD-10-CM

## 2023-03-21 DIAGNOSIS — Z12.5 SCREENING FOR PROSTATE CANCER: ICD-10-CM

## 2023-03-21 DIAGNOSIS — I10 BENIGN ESSENTIAL HYPERTENSION: ICD-10-CM

## 2023-03-21 LAB
25(OH)D3 SERPL-MCNC: 35.3 NG/ML (ref 30–100)
ALBUMIN SERPL-MCNC: 4.7 G/DL (ref 3.5–5.2)
ALBUMIN/GLOB SERPL: 1.7 G/DL
ALP SERPL-CCNC: 61 U/L (ref 39–117)
ALT SERPL W P-5'-P-CCNC: 10 U/L (ref 1–41)
ANION GAP SERPL CALCULATED.3IONS-SCNC: 12.5 MMOL/L (ref 5–15)
AST SERPL-CCNC: 21 U/L (ref 1–40)
BASOPHILS # BLD AUTO: 0.07 10*3/MM3 (ref 0–0.2)
BASOPHILS NFR BLD AUTO: 0.8 % (ref 0–1.5)
BILIRUB SERPL-MCNC: 0.9 MG/DL (ref 0–1.2)
BUN SERPL-MCNC: 12 MG/DL (ref 8–23)
BUN/CREAT SERPL: 10.8 (ref 7–25)
CALCIUM SPEC-SCNC: 9.8 MG/DL (ref 8.6–10.5)
CHLORIDE SERPL-SCNC: 103 MMOL/L (ref 98–107)
CHOLEST SERPL-MCNC: 206 MG/DL (ref 0–200)
CO2 SERPL-SCNC: 28.5 MMOL/L (ref 22–29)
CREAT SERPL-MCNC: 1.11 MG/DL (ref 0.76–1.27)
DEPRECATED RDW RBC AUTO: 39.4 FL (ref 37–54)
EGFRCR SERPLBLD CKD-EPI 2021: 73.7 ML/MIN/1.73
EOSINOPHIL # BLD AUTO: 0.17 10*3/MM3 (ref 0–0.4)
EOSINOPHIL NFR BLD AUTO: 1.9 % (ref 0.3–6.2)
ERYTHROCYTE [DISTWIDTH] IN BLOOD BY AUTOMATED COUNT: 13.2 % (ref 12.3–15.4)
GLOBULIN UR ELPH-MCNC: 2.7 GM/DL
GLUCOSE SERPL-MCNC: 85 MG/DL (ref 65–99)
HCT VFR BLD AUTO: 47.9 % (ref 37.5–51)
HDLC SERPL-MCNC: 67 MG/DL (ref 40–60)
HGB BLD-MCNC: 15.9 G/DL (ref 13–17.7)
IMM GRANULOCYTES # BLD AUTO: 0.03 10*3/MM3 (ref 0–0.05)
IMM GRANULOCYTES NFR BLD AUTO: 0.3 % (ref 0–0.5)
LDLC SERPL CALC-MCNC: 124 MG/DL (ref 0–100)
LDLC/HDLC SERPL: 1.82 {RATIO}
LYMPHOCYTES # BLD AUTO: 1.53 10*3/MM3 (ref 0.7–3.1)
LYMPHOCYTES NFR BLD AUTO: 17.3 % (ref 19.6–45.3)
MCH RBC QN AUTO: 27.8 PG (ref 26.6–33)
MCHC RBC AUTO-ENTMCNC: 33.2 G/DL (ref 31.5–35.7)
MCV RBC AUTO: 83.7 FL (ref 79–97)
MONOCYTES # BLD AUTO: 0.94 10*3/MM3 (ref 0.1–0.9)
MONOCYTES NFR BLD AUTO: 10.6 % (ref 5–12)
NEUTROPHILS NFR BLD AUTO: 6.12 10*3/MM3 (ref 1.7–7)
NEUTROPHILS NFR BLD AUTO: 69.1 % (ref 42.7–76)
NRBC BLD AUTO-RTO: 0 /100 WBC (ref 0–0.2)
PLATELET # BLD AUTO: 209 10*3/MM3 (ref 140–450)
PMV BLD AUTO: 10.8 FL (ref 6–12)
POTASSIUM SERPL-SCNC: 4 MMOL/L (ref 3.5–5.2)
PROT SERPL-MCNC: 7.4 G/DL (ref 6–8.5)
PSA SERPL-MCNC: 1.27 NG/ML (ref 0–4)
RBC # BLD AUTO: 5.72 10*6/MM3 (ref 4.14–5.8)
SODIUM SERPL-SCNC: 144 MMOL/L (ref 136–145)
T4 FREE SERPL-MCNC: 1.67 NG/DL (ref 0.93–1.7)
TRIGL SERPL-MCNC: 84 MG/DL (ref 0–150)
TSH SERPL DL<=0.05 MIU/L-ACNC: 3.85 UIU/ML (ref 0.27–4.2)
VIT B12 BLD-MCNC: 594 PG/ML (ref 211–946)
VLDLC SERPL-MCNC: 15 MG/DL (ref 5–40)
WBC NRBC COR # BLD: 8.86 10*3/MM3 (ref 3.4–10.8)

## 2023-03-21 PROCEDURE — 84439 ASSAY OF FREE THYROXINE: CPT | Performed by: NURSE PRACTITIONER

## 2023-03-21 PROCEDURE — 82306 VITAMIN D 25 HYDROXY: CPT | Performed by: NURSE PRACTITIONER

## 2023-03-21 PROCEDURE — G0103 PSA SCREENING: HCPCS | Performed by: NURSE PRACTITIONER

## 2023-03-21 PROCEDURE — 80050 GENERAL HEALTH PANEL: CPT | Performed by: NURSE PRACTITIONER

## 2023-03-21 PROCEDURE — 80061 LIPID PANEL: CPT | Performed by: NURSE PRACTITIONER

## 2023-03-21 PROCEDURE — 36415 COLL VENOUS BLD VENIPUNCTURE: CPT | Performed by: NURSE PRACTITIONER

## 2023-03-21 PROCEDURE — 82607 VITAMIN B-12: CPT | Performed by: NURSE PRACTITIONER

## 2023-08-12 DIAGNOSIS — E03.9 ACQUIRED HYPOTHYROIDISM: ICD-10-CM

## 2023-08-14 RX ORDER — LEVOTHYROXINE SODIUM 112 UG/1
112 TABLET ORAL DAILY
Qty: 90 TABLET | Refills: 1 | Status: SHIPPED | OUTPATIENT
Start: 2023-08-14

## 2023-09-21 ENCOUNTER — OFFICE VISIT (OUTPATIENT)
Dept: INTERNAL MEDICINE | Facility: CLINIC | Age: 68
End: 2023-09-21
Payer: COMMERCIAL

## 2023-09-21 VITALS
HEIGHT: 74 IN | HEART RATE: 98 BPM | DIASTOLIC BLOOD PRESSURE: 70 MMHG | OXYGEN SATURATION: 99 % | WEIGHT: 162 LBS | BODY MASS INDEX: 20.79 KG/M2 | TEMPERATURE: 97.3 F | SYSTOLIC BLOOD PRESSURE: 130 MMHG

## 2023-09-21 DIAGNOSIS — K21.9 GASTROESOPHAGEAL REFLUX DISEASE: ICD-10-CM

## 2023-09-21 DIAGNOSIS — I10 BENIGN ESSENTIAL HYPERTENSION: Primary | ICD-10-CM

## 2023-09-21 DIAGNOSIS — E03.9 HYPOTHYROIDISM, UNSPECIFIED TYPE: ICD-10-CM

## 2023-09-21 DIAGNOSIS — E55.9 VITAMIN D DEFICIENCY: ICD-10-CM

## 2023-09-21 DIAGNOSIS — N52.9 IMPOTENCE OF ORGANIC ORIGIN: ICD-10-CM

## 2023-09-21 DIAGNOSIS — C14.0 THROAT CANCER: ICD-10-CM

## 2023-09-21 PROCEDURE — 99214 OFFICE O/P EST MOD 30 MIN: CPT | Performed by: NURSE PRACTITIONER

## 2023-09-21 RX ORDER — SILDENAFIL CITRATE 20 MG/1
TABLET ORAL
Qty: 50 TABLET | Refills: 2 | Status: SHIPPED | OUTPATIENT
Start: 2023-09-21

## 2023-09-21 RX ORDER — OMEPRAZOLE 20 MG/1
20 CAPSULE, DELAYED RELEASE ORAL DAILY
Qty: 90 CAPSULE | Refills: 1 | Status: SHIPPED | OUTPATIENT
Start: 2023-09-21

## 2023-09-21 RX ORDER — NIFEDIPINE 30 MG/1
30 TABLET, EXTENDED RELEASE ORAL DAILY
Qty: 90 TABLET | Refills: 1 | Status: SHIPPED | OUTPATIENT
Start: 2023-09-21

## 2023-09-21 NOTE — PROGRESS NOTES
Subjective   Boone Riley is a 68 y.o. male    Chief Complaint   Patient presents with    Hypothyroidism     Patient in clinic for 6 month follow up and refill     History of Present Illness     BPH - Seeing Urology ([facility], now [facility]).  Takes PRN meds for ED.  Was given 20mg Sildenafil that he takes PRN/rarely. He did have a Urolift 7/20/2022. He has been able to d/c the flomax     HTN is well controlled on Procardia XL 30mg; did not tolerate an ACE or an ARB.  Checks his BP intermittently at home.  States that blood pressure does run much lower at home!      Hypothyroid - should be taking 112 mcg of Synthroid but he thinks he may only be taking 100 mcg and did not increase in Sept when we called with labs.       GERD - chronic and stable on Omeprazole 20mg daily; sx's well controlled.  Does not take on a daily basis.        H/O throat (base of tongue) cancer.  Dx'd in 2008.  Followed by Dr. CAMACHO.  All recent reports have been good. Seeing him on a yearly basis now.  All good reports!!     Vit D and B12 def - chronic/levels are low.  Not currently taking any supplements     Flu shot - 10/2022 @ work  COVID - 12/23/20, 1/22/21  Tdap - 2014  PSA - per Urology yearly.    colon - 10/1/2018  Hep A - 2/2019, 8/2019       The following portions of the patient's history were reviewed and updated as appropriate: allergies, current medications, past family history, past medical history, past social history, past surgical history, and problem list.    Current Outpatient Medications:     levothyroxine (Synthroid) 112 MCG tablet, Take 1 tablet by mouth Daily., Disp: 90 tablet, Rfl: 1    NIFEdipine XL (Procardia XL) 30 MG 24 hr tablet, Take 1 tablet by mouth Daily., Disp: 90 tablet, Rfl: 1    omeprazole (priLOSEC) 20 MG capsule, Take 1 capsule by mouth Daily., Disp: 90 capsule, Rfl: 1    sildenafil (REVATIO) 20 MG tablet, 2-5 PO as needed for sexual activity, Disp: 50 tablet, Rfl: 2     Review of Systems   Constitutional:  " Negative for chills, fatigue and fever.   Respiratory:  Negative for cough, chest tightness and shortness of breath.    Cardiovascular:  Negative for chest pain.   Gastrointestinal:  Negative for abdominal pain, diarrhea, nausea and vomiting.   Endocrine: Negative for cold intolerance and heat intolerance.   Musculoskeletal:  Negative for arthralgias.   Neurological:  Negative for dizziness.     Objective   Physical Exam  Constitutional:       Appearance: He is well-developed.   HENT:      Head: Normocephalic and atraumatic.   Eyes:      Conjunctiva/sclera: Conjunctivae normal.      Pupils: Pupils are equal, round, and reactive to light.   Cardiovascular:      Rate and Rhythm: Normal rate and regular rhythm.      Heart sounds: Normal heart sounds.   Pulmonary:      Effort: Pulmonary effort is normal.      Breath sounds: Normal breath sounds.   Abdominal:      General: Bowel sounds are normal.      Palpations: Abdomen is soft.   Musculoskeletal:         General: Normal range of motion.      Cervical back: Normal range of motion.   Skin:     General: Skin is warm and dry.   Neurological:      Mental Status: He is alert and oriented to person, place, and time.      Deep Tendon Reflexes: Reflexes are normal and symmetric.   Psychiatric:         Behavior: Behavior normal.         Thought Content: Thought content normal.         Judgment: Judgment normal.     Vitals:    09/21/23 0823   BP: 130/70   Pulse: 98   Temp: 97.3 °F (36.3 °C)   SpO2: 99%   Weight: 73.5 kg (162 lb)   Height: 186.7 cm (73.5\")         Assessment & Plan   Diagnoses and all orders for this visit:    1. Benign essential hypertension (Primary)  -     NIFEdipine XL (Procardia XL) 30 MG 24 hr tablet; Take 1 tablet by mouth Daily.  Dispense: 90 tablet; Refill: 1  -     CBC & Differential; Future  -     Comprehensive Metabolic Panel; Future  -     Lipid Panel; Future  -     TSH; Future  -     Vitamin B12; Future  -     Vitamin D,25-Hydroxy; Future    2. " Gastroesophageal reflux disease  -     omeprazole (priLOSEC) 20 MG capsule; Take 1 capsule by mouth Daily.  Dispense: 90 capsule; Refill: 1  -     CBC & Differential; Future  -     Comprehensive Metabolic Panel; Future  -     Lipid Panel; Future  -     TSH; Future  -     Vitamin B12; Future  -     Vitamin D,25-Hydroxy; Future    3. Impotence of organic origin  -     sildenafil (REVATIO) 20 MG tablet; 2-5 PO as needed for sexual activity  Dispense: 50 tablet; Refill: 2  -     CBC & Differential; Future  -     Comprehensive Metabolic Panel; Future  -     Lipid Panel; Future  -     TSH; Future  -     Vitamin B12; Future  -     Vitamin D,25-Hydroxy; Future    4. Hypothyroidism, unspecified type  -     CBC & Differential; Future  -     Comprehensive Metabolic Panel; Future  -     Lipid Panel; Future  -     TSH; Future  -     Vitamin B12; Future  -     Vitamin D,25-Hydroxy; Future  -     T4, Free; Future    5. Throat cancer  -     CBC & Differential; Future  -     Comprehensive Metabolic Panel; Future  -     Lipid Panel; Future  -     TSH; Future  -     Vitamin B12; Future  -     Vitamin D,25-Hydroxy; Future    6. Vitamin D deficiency  -     CBC & Differential; Future  -     Comprehensive Metabolic Panel; Future  -     Lipid Panel; Future  -     TSH; Future  -     Vitamin B12; Future  -     Vitamin D,25-Hydroxy; Future      Meds refilled  He is not fasting, so he will RTC for labs  Will get flu shot at work  Return in about 6 months (around 3/21/2024) for Annual.

## 2023-09-22 ENCOUNTER — LAB (OUTPATIENT)
Dept: INTERNAL MEDICINE | Facility: CLINIC | Age: 68
End: 2023-09-22
Payer: COMMERCIAL

## 2023-09-22 DIAGNOSIS — I10 BENIGN ESSENTIAL HYPERTENSION: ICD-10-CM

## 2023-09-22 DIAGNOSIS — E55.9 VITAMIN D DEFICIENCY: ICD-10-CM

## 2023-09-22 DIAGNOSIS — K21.9 GASTROESOPHAGEAL REFLUX DISEASE: ICD-10-CM

## 2023-09-22 DIAGNOSIS — E03.9 HYPOTHYROIDISM, UNSPECIFIED TYPE: ICD-10-CM

## 2023-09-22 DIAGNOSIS — C14.0 THROAT CANCER: ICD-10-CM

## 2023-09-22 DIAGNOSIS — N52.9 IMPOTENCE OF ORGANIC ORIGIN: ICD-10-CM

## 2023-09-22 LAB
25(OH)D3 SERPL-MCNC: 30.4 NG/ML (ref 30–100)
ALBUMIN SERPL-MCNC: 4.6 G/DL (ref 3.5–5.2)
ALBUMIN/GLOB SERPL: 1.9 G/DL
ALP SERPL-CCNC: 59 U/L (ref 39–117)
ALT SERPL W P-5'-P-CCNC: 12 U/L (ref 1–41)
ANION GAP SERPL CALCULATED.3IONS-SCNC: 11.2 MMOL/L (ref 5–15)
AST SERPL-CCNC: 21 U/L (ref 1–40)
BASOPHILS # BLD AUTO: 0.07 10*3/MM3 (ref 0–0.2)
BASOPHILS NFR BLD AUTO: 1.1 % (ref 0–1.5)
BILIRUB SERPL-MCNC: 0.7 MG/DL (ref 0–1.2)
BUN SERPL-MCNC: 16 MG/DL (ref 8–23)
BUN/CREAT SERPL: 14.4 (ref 7–25)
CALCIUM SPEC-SCNC: 9.3 MG/DL (ref 8.6–10.5)
CHLORIDE SERPL-SCNC: 102 MMOL/L (ref 98–107)
CHOLEST SERPL-MCNC: 196 MG/DL (ref 0–200)
CO2 SERPL-SCNC: 26.8 MMOL/L (ref 22–29)
CREAT SERPL-MCNC: 1.11 MG/DL (ref 0.76–1.27)
DEPRECATED RDW RBC AUTO: 41 FL (ref 37–54)
EGFRCR SERPLBLD CKD-EPI 2021: 73.2 ML/MIN/1.73
EOSINOPHIL # BLD AUTO: 0.14 10*3/MM3 (ref 0–0.4)
EOSINOPHIL NFR BLD AUTO: 2.2 % (ref 0.3–6.2)
ERYTHROCYTE [DISTWIDTH] IN BLOOD BY AUTOMATED COUNT: 13.4 % (ref 12.3–15.4)
GLOBULIN UR ELPH-MCNC: 2.4 GM/DL
GLUCOSE SERPL-MCNC: 91 MG/DL (ref 65–99)
HCT VFR BLD AUTO: 45.9 % (ref 37.5–51)
HDLC SERPL-MCNC: 60 MG/DL (ref 40–60)
HGB BLD-MCNC: 15.6 G/DL (ref 13–17.7)
IMM GRANULOCYTES # BLD AUTO: 0.06 10*3/MM3 (ref 0–0.05)
IMM GRANULOCYTES NFR BLD AUTO: 0.9 % (ref 0–0.5)
LDLC SERPL CALC-MCNC: 123 MG/DL (ref 0–100)
LDLC/HDLC SERPL: 2.04 {RATIO}
LYMPHOCYTES # BLD AUTO: 1.35 10*3/MM3 (ref 0.7–3.1)
LYMPHOCYTES NFR BLD AUTO: 21.1 % (ref 19.6–45.3)
MCH RBC QN AUTO: 28.6 PG (ref 26.6–33)
MCHC RBC AUTO-ENTMCNC: 34 G/DL (ref 31.5–35.7)
MCV RBC AUTO: 84.2 FL (ref 79–97)
MONOCYTES # BLD AUTO: 0.67 10*3/MM3 (ref 0.1–0.9)
MONOCYTES NFR BLD AUTO: 10.5 % (ref 5–12)
NEUTROPHILS NFR BLD AUTO: 4.1 10*3/MM3 (ref 1.7–7)
NEUTROPHILS NFR BLD AUTO: 64.2 % (ref 42.7–76)
NRBC BLD AUTO-RTO: 0 /100 WBC (ref 0–0.2)
PLATELET # BLD AUTO: 205 10*3/MM3 (ref 140–450)
PMV BLD AUTO: 10.8 FL (ref 6–12)
POTASSIUM SERPL-SCNC: 4 MMOL/L (ref 3.5–5.2)
PROT SERPL-MCNC: 7 G/DL (ref 6–8.5)
RBC # BLD AUTO: 5.45 10*6/MM3 (ref 4.14–5.8)
SODIUM SERPL-SCNC: 140 MMOL/L (ref 136–145)
T4 FREE SERPL-MCNC: 1.95 NG/DL (ref 0.93–1.7)
TRIGL SERPL-MCNC: 69 MG/DL (ref 0–150)
TSH SERPL DL<=0.05 MIU/L-ACNC: 1.05 UIU/ML (ref 0.27–4.2)
VIT B12 BLD-MCNC: 450 PG/ML (ref 211–946)
VLDLC SERPL-MCNC: 13 MG/DL (ref 5–40)
WBC NRBC COR # BLD: 6.39 10*3/MM3 (ref 3.4–10.8)

## 2023-09-22 PROCEDURE — 84439 ASSAY OF FREE THYROXINE: CPT | Performed by: NURSE PRACTITIONER

## 2023-09-22 PROCEDURE — 80050 GENERAL HEALTH PANEL: CPT | Performed by: NURSE PRACTITIONER

## 2023-09-22 PROCEDURE — 36415 COLL VENOUS BLD VENIPUNCTURE: CPT | Performed by: NURSE PRACTITIONER

## 2023-09-22 PROCEDURE — 82607 VITAMIN B-12: CPT | Performed by: NURSE PRACTITIONER

## 2023-09-22 PROCEDURE — 82306 VITAMIN D 25 HYDROXY: CPT | Performed by: NURSE PRACTITIONER

## 2023-09-22 PROCEDURE — 80061 LIPID PANEL: CPT | Performed by: NURSE PRACTITIONER

## 2023-10-16 DIAGNOSIS — I10 BENIGN ESSENTIAL HYPERTENSION: ICD-10-CM

## 2023-10-16 RX ORDER — NIFEDIPINE 30 MG/1
30 TABLET, EXTENDED RELEASE ORAL DAILY
Qty: 90 TABLET | Refills: 1 | OUTPATIENT
Start: 2023-10-16

## 2023-10-16 RX ORDER — NIFEDIPINE 30 MG/1
30 TABLET, EXTENDED RELEASE ORAL DAILY
Qty: 90 TABLET | Refills: 1 | Status: SHIPPED | OUTPATIENT
Start: 2023-10-16 | End: 2023-10-17 | Stop reason: SDUPTHER

## 2023-10-16 NOTE — TELEPHONE ENCOUNTER
Caller: Boone Riley    Relationship: Self    Best call back number: [phone]     Requested Prescriptions:   Requested Prescriptions     Pending Prescriptions Disp Refills    NIFEdipine XL (Procardia XL) 30 MG 24 hr tablet 90 tablet 1     Sig: Take 1 tablet by mouth Daily.        Pharmacy where request should be sent: [id number] - [address] - [phone]  - [phone] FX     Last office visit with prescribing clinician: 9/21/2023   Last telemedicine visit with prescribing clinician: Visit date not found   Next office visit with prescribing clinician: 4/1/2024     Additional details provided by patient: PATIENT IS OUT OF MEDICATION    Does the patient have less than a 3 day supply:  [x] Yes  [] No    Would you like a call back once the refill request has been completed: [] Yes [x] No    If the office needs to give you a call back, can they leave a voicemail: [] Yes [x] No    Danelle Hawley Rep   10/16/23 09:19 EDT

## 2023-10-16 NOTE — TELEPHONE ENCOUNTER
PT CALLING BACK ABOUT REFILL.  COMPLETELY OUT OF PROCARDIA XL 30 MG, PHARMACY STATING THAT THEY DO NOT HAVE AN ORDER IN.

## 2023-10-17 DIAGNOSIS — I10 BENIGN ESSENTIAL HYPERTENSION: ICD-10-CM

## 2023-10-17 RX ORDER — NIFEDIPINE 30 MG/1
30 TABLET, EXTENDED RELEASE ORAL DAILY
Qty: 90 TABLET | Refills: 1 | Status: SHIPPED | OUTPATIENT
Start: 2023-10-17

## 2023-10-17 NOTE — TELEPHONE ENCOUNTER
Caller: Boone Riley    Relationship: Self    Best call back number: [phone]    Requested Prescriptions:   Requested Prescriptions     Pending Prescriptions Disp Refills    NIFEdipine XL (Procardia XL) 30 MG 24 hr tablet 90 tablet 1     Sig: Take 1 tablet by mouth Daily.        Pharmacy where request should be sent: [id number] - [address] - [phone]  - [phone] FX     Last office visit with prescribing clinician: 9/21/2023   Last telemedicine visit with prescribing clinician: Visit date not found   Next office visit with prescribing clinician: 4/1/2024     Additional details provided by patient: THE PHARMACY DID NOT RECEIVE THE PRESCRIPTION     Does the patient have less than a 3 day supply:  [x] Yes  [] No    Would you like a call back once the refill request has been completed: [x] Yes [] No    If the office needs to give you a call back, can they leave a voicemail: [x] Yes [] No    Danelle Baldwin Rep   10/17/23 10:56 EDT

## 2024-02-14 DIAGNOSIS — E03.9 ACQUIRED HYPOTHYROIDISM: ICD-10-CM

## 2024-02-14 RX ORDER — LEVOTHYROXINE SODIUM 112 UG/1
112 TABLET ORAL DAILY
Qty: 90 TABLET | Refills: 0 | Status: SHIPPED | OUTPATIENT
Start: 2024-02-14

## 2024-04-01 ENCOUNTER — OFFICE VISIT (OUTPATIENT)
Dept: NEUROSURGERY | Facility: CLINIC | Age: 69
End: 2024-04-01
Payer: COMMERCIAL

## 2024-04-01 VITALS
HEIGHT: 74 IN | SYSTOLIC BLOOD PRESSURE: 114 MMHG | WEIGHT: 162 LBS | DIASTOLIC BLOOD PRESSURE: 72 MMHG | BODY MASS INDEX: 20.79 KG/M2 | TEMPERATURE: 97.5 F

## 2024-04-01 DIAGNOSIS — I65.22 CAROTID STENOSIS, ASYMPTOMATIC, LEFT: Primary | ICD-10-CM

## 2024-04-01 PROCEDURE — 99204 OFFICE O/P NEW MOD 45 MIN: CPT | Performed by: RADIOLOGY

## 2024-04-01 RX ORDER — ASPIRIN 81 MG/1
81 TABLET, CHEWABLE ORAL DAILY
Qty: 30 TABLET | Refills: 5 | Status: SHIPPED | OUTPATIENT
Start: 2024-04-01

## 2024-04-01 RX ORDER — CLOPIDOGREL BISULFATE 75 MG/1
75 TABLET ORAL DAILY
Qty: 30 TABLET | Refills: 2 | Status: SHIPPED | OUTPATIENT
Start: 2024-04-01

## 2024-04-01 RX ORDER — SODIUM CHLORIDE 9 MG/ML
50 INJECTION, SOLUTION INTRAVENOUS CONTINUOUS
OUTPATIENT
Start: 2024-04-01

## 2024-04-01 RX ORDER — SODIUM CHLORIDE 0.9 % (FLUSH) 0.9 %
1-10 SYRINGE (ML) INJECTION AS NEEDED
OUTPATIENT
Start: 2024-04-01

## 2024-04-01 RX ORDER — SODIUM CHLORIDE 9 MG/ML
40 INJECTION, SOLUTION INTRAVENOUS AS NEEDED
OUTPATIENT
Start: 2024-04-01

## 2024-04-01 RX ORDER — SODIUM CHLORIDE 0.9 % (FLUSH) 0.9 %
10 SYRINGE (ML) INJECTION EVERY 12 HOURS SCHEDULED
OUTPATIENT
Start: 2024-04-01

## 2024-04-01 NOTE — PROGRESS NOTES
"NAME: LOLITA LANDAVERDE   DOS: 2024  : 1955  PCP: Yesy Kearney APRN    Chief Complaint:    Chief Complaint   Patient presents with    Carotid Artery Disease       History of Present Illness:  68 y.o. male with history of head neck cancer, status post left modified radical neck dissection, along with radiation therapy.  He completed his treatment over 10 years ago, and is thought to be \"disease-free\".  He has undergone noninvasive imaging studies which raise a question of a high-grade left common carotid artery stenosis.  He denies any prior stroke or TIA-like symptoms.  Specifically, no unilateral weakness/numbness, no speech or vision changes.  He is currently on Lipitor statin regimen, but not any antiplatelet therapy.  I am seeing him in consultation regarding his asymptomatic left carotid disease.    Past Medical History:  Past Medical History:   Diagnosis Date    Allergic     Seasonal    Carotid artery occlusion 3/28/2024    CTA report, same date    Cervical disc disorder Seceral years    Noted in CTA report 3/28/2024    Diverticulosis of small intestine without hemorrhage     Erectile dysfunction 2018    Esophageal reflux     Essential and other specified forms of tremor     General medical exam     HL (hearing loss) 2018    Began with chemo in , not severe    Hypertension     Hypertrophy (benign) of prostate     Hypothyroidism     Paroxysmal supraventricular tachycardia     Peripheral neuropathy Seversl years    Left hand, thumb snd index finger numb    Screening PSA (prostate specific antigen)     per Urology    Sleep disorder     Spinal stenosis of cervical region     Throat cancer 2008    base of tongue    Tremor 2010    Diagnosed essential tremor       Past Surgical History:  Past Surgical History:   Procedure Laterality Date    CARDIAC ABLATION   or 2009?    COLONOSCOPY      due 2018    ENDOSCOPY  2015    Dr. Gallo- Dialation    NECK DISSECTION      Dr. CAMACHO    PROSTATE " SURGERY  07/20/2022    Urolift       Review of Systems:        Review of Systems   Constitutional:  Positive for fatigue. Negative for activity change, appetite change, chills, diaphoresis, fever and unexpected weight change.   HENT:  Positive for trouble swallowing. Negative for congestion, dental problem, drooling, ear discharge, ear pain, facial swelling, hearing loss, mouth sores, nosebleeds, postnasal drip, rhinorrhea, sinus pressure, sinus pain, sneezing, sore throat, tinnitus and voice change.    Eyes:  Negative for photophobia, pain, discharge, redness, itching and visual disturbance.   Respiratory:  Negative for apnea, cough, choking, chest tightness, shortness of breath, wheezing and stridor.    Cardiovascular:  Positive for palpitations. Negative for chest pain and leg swelling.   Gastrointestinal:  Negative for abdominal distention, abdominal pain, anal bleeding, blood in stool, constipation, diarrhea, nausea, rectal pain and vomiting.   Endocrine: Negative for cold intolerance, heat intolerance, polydipsia, polyphagia and polyuria.   Genitourinary:  Negative for decreased urine volume, difficulty urinating, dysuria, enuresis, flank pain, frequency, genital sores, hematuria, penile discharge, penile pain, penile swelling, scrotal swelling, testicular pain and urgency.   Musculoskeletal:  Negative for arthralgias, back pain, gait problem, joint swelling, myalgias, neck pain and neck stiffness.   Skin:  Negative for color change, pallor, rash and wound.   Allergic/Immunologic: Negative for environmental allergies, food allergies and immunocompromised state.   Neurological:  Positive for light-headedness and numbness. Negative for dizziness, tremors, seizures, syncope, facial asymmetry, speech difficulty, weakness and headaches.   Hematological:  Negative for adenopathy. Does not bruise/bleed easily.   Psychiatric/Behavioral:  Negative for agitation, behavioral problems, confusion, decreased concentration,  dysphoric mood, hallucinations, self-injury, sleep disturbance and suicidal ideas. The patient is not nervous/anxious and is not hyperactive.         Medications    Current Outpatient Medications:     aspirin 81 MG chewable tablet, Chew 1 tablet Daily., Disp: 30 tablet, Rfl: 5    clopidogrel (Plavix) 75 MG tablet, Take 1 tablet by mouth Daily. Start taking 5 days prior to the procedure, Disp: 30 tablet, Rfl: 2    levothyroxine (SYNTHROID, LEVOTHROID) 112 MCG tablet, Take 1 tablet by mouth once daily, Disp: 90 tablet, Rfl: 0    NIFEdipine XL (Procardia XL) 30 MG 24 hr tablet, Take 1 tablet by mouth Daily., Disp: 90 tablet, Rfl: 1    omeprazole (priLOSEC) 20 MG capsule, Take 1 capsule by mouth Daily., Disp: 90 capsule, Rfl: 1    sildenafil (REVATIO) 20 MG tablet, 2-5 PO as needed for sexual activity, Disp: 50 tablet, Rfl: 2    Allergies:  No Known Allergies    Social Hx:  Social History     Tobacco Use    Smoking status: Never    Smokeless tobacco: Never   Vaping Use    Vaping status: Never Used   Substance Use Topics    Alcohol use: No    Drug use: No       Family Hx:  Family History   Problem Relation Age of Onset    Breast cancer Mother     Cancer Mother         Breast Cancer    Early death Mother         Due to untreated breast cancer, age 53    Lung cancer Father     Alcohol abuse Father     Cancer Father         Lung cancer, lifetime smoker    Diabetes Brother         Type 2 onset approx age 55       Review of Imaging:  Carotid duplex and CT angiogram of the neck, both dated 3/28/2024 from [facility] were reviewed along with her corresponding radiologic reports.  The carotid duplex demonstrates elevated peak systolic velocities of 346 cm/s within the mid cervical left common carotid artery, concerning for high-grade (greater than 70%) stenosis.  These findings are corroborated with the CT angiogram, demonstrating eccentric lipid-laden and calcific plaque within the mid cervical left common  "carotid artery, producing a high-grade stenosis.  There is relatively mild plaque formation at the carotid bifurcations, without hemodynamically significant disease.      Physical Examination:  Vitals:    04/01/24 1435   BP: 114/72   Temp: 97.5 °F (36.4 °C)        General Appearance:   Well developed, well nourished, well groomed, alert, and cooperative.  Cardiovascular: Regular rate and rhythm. No carotid bruits      Neurological examination:  Alert and oriented x 3.  Speech is clear.  I do not appreciate any gross visual field deficits.  Extraocular muscles are full.  He has a rigid neck from prior radiation therapy.  Strength is symmetric in the upper and lower extremities.  No pronator drift.  He ambulates unassisted.    Diagnoses/Plan:    Mr. Riley is a 68 y.o. male with asymptomatic, high-grade stenosis involving the mid cervical left common carotid artery.  He denies any prior stroke or TIA-like symptoms.  He does have history of head neck malignancy with prior surgery and radiation therapy, and thus he is a \"high surgical risk\" for endarterectomy.  Treatment options to include conservative management versus angioplasty/stent placement were discussed in detail with Mr. Riley and family, and they are concerned about his stroke risk, and wished to proceed with carotid revascularization.  He is scheduled to follow-up with Dr. Petersen of ENT in the next week or so for further evaluation, and I recommended that we hold off until this has been completed, as we will need to start a dual antiplatelet regimen for his procedure.  Once he has seen his ENT, he will contact our office to schedule his carotid stent, and we will start him on a Plavix/aspirin regimen for 5 days leading up to the procedure.  We will try to perform his angiogram and left carotid stent placement via right radial access.  During the interim, he will contact our office in/or call 911 if he experiences any stroke or TIA-like " symptoms.

## 2024-04-01 NOTE — H&P (VIEW-ONLY)
"NAME: LOLITA LANDAVERDE   DOS: 2024  : 1955  PCP: Yesy Kearney APRN    Chief Complaint:    Chief Complaint   Patient presents with    Carotid Artery Disease       History of Present Illness:  68 y.o. male with history of head neck cancer, status post left modified radical neck dissection, along with radiation therapy.  He completed his treatment over 10 years ago, and is thought to be \"disease-free\".  He has undergone noninvasive imaging studies which raise a question of a high-grade left common carotid artery stenosis.  He denies any prior stroke or TIA-like symptoms.  Specifically, no unilateral weakness/numbness, no speech or vision changes.  He is currently on Lipitor statin regimen, but not any antiplatelet therapy.  I am seeing him in consultation regarding his asymptomatic left carotid disease.    Past Medical History:  Past Medical History:   Diagnosis Date    Allergic     Seasonal    Carotid artery occlusion 3/28/2024    CTA report, same date    Cervical disc disorder Seceral years    Noted in CTA report 3/28/2024    Diverticulosis of small intestine without hemorrhage     Erectile dysfunction 2018    Esophageal reflux     Essential and other specified forms of tremor     General medical exam     HL (hearing loss) 2018    Began with chemo in , not severe    Hypertension     Hypertrophy (benign) of prostate     Hypothyroidism     Paroxysmal supraventricular tachycardia     Peripheral neuropathy Seversl years    Left hand, thumb snd index finger numb    Screening PSA (prostate specific antigen)     per Urology    Sleep disorder     Spinal stenosis of cervical region     Throat cancer 2008    base of tongue    Tremor 2010    Diagnosed essential tremor       Past Surgical History:  Past Surgical History:   Procedure Laterality Date    CARDIAC ABLATION   or 2009?    COLONOSCOPY      due 2018    ENDOSCOPY  2015    Dr. Gallo- Dialation    NECK DISSECTION      Dr. CAMACHO    PROSTATE " SURGERY  07/20/2022    Urolift       Review of Systems:        Review of Systems   Constitutional:  Positive for fatigue. Negative for activity change, appetite change, chills, diaphoresis, fever and unexpected weight change.   HENT:  Positive for trouble swallowing. Negative for congestion, dental problem, drooling, ear discharge, ear pain, facial swelling, hearing loss, mouth sores, nosebleeds, postnasal drip, rhinorrhea, sinus pressure, sinus pain, sneezing, sore throat, tinnitus and voice change.    Eyes:  Negative for photophobia, pain, discharge, redness, itching and visual disturbance.   Respiratory:  Negative for apnea, cough, choking, chest tightness, shortness of breath, wheezing and stridor.    Cardiovascular:  Positive for palpitations. Negative for chest pain and leg swelling.   Gastrointestinal:  Negative for abdominal distention, abdominal pain, anal bleeding, blood in stool, constipation, diarrhea, nausea, rectal pain and vomiting.   Endocrine: Negative for cold intolerance, heat intolerance, polydipsia, polyphagia and polyuria.   Genitourinary:  Negative for decreased urine volume, difficulty urinating, dysuria, enuresis, flank pain, frequency, genital sores, hematuria, penile discharge, penile pain, penile swelling, scrotal swelling, testicular pain and urgency.   Musculoskeletal:  Negative for arthralgias, back pain, gait problem, joint swelling, myalgias, neck pain and neck stiffness.   Skin:  Negative for color change, pallor, rash and wound.   Allergic/Immunologic: Negative for environmental allergies, food allergies and immunocompromised state.   Neurological:  Positive for light-headedness and numbness. Negative for dizziness, tremors, seizures, syncope, facial asymmetry, speech difficulty, weakness and headaches.   Hematological:  Negative for adenopathy. Does not bruise/bleed easily.   Psychiatric/Behavioral:  Negative for agitation, behavioral problems, confusion, decreased concentration,  dysphoric mood, hallucinations, self-injury, sleep disturbance and suicidal ideas. The patient is not nervous/anxious and is not hyperactive.         Medications    Current Outpatient Medications:     aspirin 81 MG chewable tablet, Chew 1 tablet Daily., Disp: 30 tablet, Rfl: 5    clopidogrel (Plavix) 75 MG tablet, Take 1 tablet by mouth Daily. Start taking 5 days prior to the procedure, Disp: 30 tablet, Rfl: 2    levothyroxine (SYNTHROID, LEVOTHROID) 112 MCG tablet, Take 1 tablet by mouth once daily, Disp: 90 tablet, Rfl: 0    NIFEdipine XL (Procardia XL) 30 MG 24 hr tablet, Take 1 tablet by mouth Daily., Disp: 90 tablet, Rfl: 1    omeprazole (priLOSEC) 20 MG capsule, Take 1 capsule by mouth Daily., Disp: 90 capsule, Rfl: 1    sildenafil (REVATIO) 20 MG tablet, 2-5 PO as needed for sexual activity, Disp: 50 tablet, Rfl: 2    Allergies:  No Known Allergies    Social Hx:  Social History     Tobacco Use    Smoking status: Never    Smokeless tobacco: Never   Vaping Use    Vaping status: Never Used   Substance Use Topics    Alcohol use: No    Drug use: No       Family Hx:  Family History   Problem Relation Age of Onset    Breast cancer Mother     Cancer Mother         Breast Cancer    Early death Mother         Due to untreated breast cancer, age 53    Lung cancer Father     Alcohol abuse Father     Cancer Father         Lung cancer, lifetime smoker    Diabetes Brother         Type 2 onset approx age 55       Review of Imaging:  Carotid duplex and CT angiogram of the neck, both dated 3/28/2024 from [facility] were reviewed along with her corresponding radiologic reports.  The carotid duplex demonstrates elevated peak systolic velocities of 346 cm/s within the mid cervical left common carotid artery, concerning for high-grade (greater than 70%) stenosis.  These findings are corroborated with the CT angiogram, demonstrating eccentric lipid-laden and calcific plaque within the mid cervical left common  "carotid artery, producing a high-grade stenosis.  There is relatively mild plaque formation at the carotid bifurcations, without hemodynamically significant disease.      Physical Examination:  Vitals:    04/01/24 1435   BP: 114/72   Temp: 97.5 °F (36.4 °C)        General Appearance:   Well developed, well nourished, well groomed, alert, and cooperative.  Cardiovascular: Regular rate and rhythm. No carotid bruits      Neurological examination:  Alert and oriented x 3.  Speech is clear.  I do not appreciate any gross visual field deficits.  Extraocular muscles are full.  He has a rigid neck from prior radiation therapy.  Strength is symmetric in the upper and lower extremities.  No pronator drift.  He ambulates unassisted.    Diagnoses/Plan:    Mr. Riley is a 68 y.o. male with asymptomatic, high-grade stenosis involving the mid cervical left common carotid artery.  He denies any prior stroke or TIA-like symptoms.  He does have history of head neck malignancy with prior surgery and radiation therapy, and thus he is a \"high surgical risk\" for endarterectomy.  Treatment options to include conservative management versus angioplasty/stent placement were discussed in detail with Mr. Riley and family, and they are concerned about his stroke risk, and wished to proceed with carotid revascularization.  He is scheduled to follow-up with Dr. Petersen of ENT in the next week or so for further evaluation, and I recommended that we hold off until this has been completed, as we will need to start a dual antiplatelet regimen for his procedure.  Once he has seen his ENT, he will contact our office to schedule his carotid stent, and we will start him on a Plavix/aspirin regimen for 5 days leading up to the procedure.  We will try to perform his angiogram and left carotid stent placement via right radial access.  During the interim, he will contact our office in/or call 911 if he experiences any stroke or TIA-like " symptoms.

## 2024-04-08 DIAGNOSIS — I10 BENIGN ESSENTIAL HYPERTENSION: ICD-10-CM

## 2024-04-08 RX ORDER — NIFEDIPINE 30 MG/1
30 TABLET, EXTENDED RELEASE ORAL DAILY
Qty: 90 TABLET | Refills: 0 | Status: SHIPPED | OUTPATIENT
Start: 2024-04-08

## 2024-04-08 NOTE — TELEPHONE ENCOUNTER
Rx Refill Note  Requested Prescriptions     Pending Prescriptions Disp Refills    NIFEdipine XL (PROCARDIA XL) 30 MG 24 hr tablet [Pharmacy Med Name: NIFEdipine ER Osmotic Release Oral Tablet Extended Release 24 Hour 30 MG] 90 tablet 0     Sig: TAKE 1 TABLET BY MOUTH EVERY DAY      Last office visit with prescribing clinician: 9/21/2023   Last telemedicine visit with prescribing clinician: Visit date not found   Next office visit with prescribing clinician: 4/15/2024       Ruma Pena MA  04/08/24, 07:55 EDT

## 2024-04-11 PROBLEM — I65.22 CAROTID STENOSIS, ASYMPTOMATIC, LEFT: Status: ACTIVE | Noted: 2024-04-01

## 2024-04-15 ENCOUNTER — OFFICE VISIT (OUTPATIENT)
Dept: INTERNAL MEDICINE | Facility: CLINIC | Age: 69
End: 2024-04-15
Payer: COMMERCIAL

## 2024-04-15 VITALS
TEMPERATURE: 99.1 F | OXYGEN SATURATION: 98 % | BODY MASS INDEX: 20.97 KG/M2 | SYSTOLIC BLOOD PRESSURE: 136 MMHG | HEART RATE: 95 BPM | WEIGHT: 163.4 LBS | RESPIRATION RATE: 16 BRPM | DIASTOLIC BLOOD PRESSURE: 86 MMHG | HEIGHT: 74 IN

## 2024-04-15 DIAGNOSIS — E55.9 VITAMIN D DEFICIENCY: ICD-10-CM

## 2024-04-15 DIAGNOSIS — Z00.00 ROUTINE GENERAL MEDICAL EXAMINATION AT A HEALTH CARE FACILITY: Primary | ICD-10-CM

## 2024-04-15 DIAGNOSIS — Z85.819 HISTORY OF ORAL CANCER: ICD-10-CM

## 2024-04-15 DIAGNOSIS — I65.22 CAROTID STENOSIS, ASYMPTOMATIC, LEFT: ICD-10-CM

## 2024-04-15 DIAGNOSIS — I10 BENIGN ESSENTIAL HYPERTENSION: ICD-10-CM

## 2024-04-15 DIAGNOSIS — E03.9 HYPOTHYROIDISM, UNSPECIFIED TYPE: ICD-10-CM

## 2024-04-15 DIAGNOSIS — Z12.5 SCREENING FOR PROSTATE CANCER: ICD-10-CM

## 2024-04-15 PROCEDURE — 99397 PER PM REEVAL EST PAT 65+ YR: CPT | Performed by: NURSE PRACTITIONER

## 2024-04-15 NOTE — PROGRESS NOTES
Subjective   Boone Riley is a 68 y.o. male    Chief Complaint   Patient presents with    Annual Exam     Patient is here for annual exam, patient states he is scheduled Thursday to get a stint. He states this was just determined last week.      History of Present Illness     Frederick works at [employer].  They were running tests at work and did a carotid duplex on him that led to a CTA of the neck which revealed 80 % stenosis of the left carotid.  He has seen Dr. Ballesteros and will be having a angiogram on Thursday of this week.  Had PET earlier in relation to this as well.  Groundglass was noted in lung.  He will have repeat scan in 3 months.      BPH - Seeing Urology ([facility], now [facility]).  Takes PRN meds for ED.  Was given 20mg Sildenafil that he takes PRN/rarely. He did have a Urolift 7/20/2022. He has been able to d/c the flomax     HTN is well controlled on Procardia XL 30mg; did not tolerate an ACE or an ARB.  Checks his BP intermittently at home.  States that blood pressure does run much lower at home.        Hypothyroid - should be taking 112 mcg of Synthroid but he thinks he may only be taking 100 mcg and did not increase in Sept when we called with labs.       GERD - chronic and stable on Omeprazole 20mg daily; sx's well controlled.  Does not take on a daily basis.        H/O throat (base of tongue) cancer.  Dx'd in 2008.  Followed by Dr. CAMACHO.  All recent reports have been good. Seeing him on a yearly basis now.      Vit D and B12 def - chronic/levels are low.  Not currently taking any supplements     Flu shot - 10/2023 @ work  COVID - 12/23/20, 1/22/21, declines booster  Tdap - 2014  PSA - ordered  colon - 10/1/2018  Hep A - 2/2019, 8/2019    Diet - generally healthy, tries to eat high caloric    Exercise - moderately active    Social History     Tobacco Use    Smoking status: Never     Passive exposure: Never    Smokeless tobacco: Never   Vaping Use    Vaping status: Never Used   Substance Use Topics     Alcohol use: No    Drug use: No       The following portions of the patient's history were reviewed and updated as appropriate: allergies, current medications, past family history, past medical history, past social history, past surgical history, and problem list.    Current Outpatient Medications:     aspirin 81 MG chewable tablet, Chew 1 tablet Daily., Disp: 30 tablet, Rfl: 5    clopidogrel (Plavix) 75 MG tablet, Take 1 tablet by mouth Daily. Start taking 5 days prior to the procedure, Disp: 30 tablet, Rfl: 2    levothyroxine (SYNTHROID, LEVOTHROID) 112 MCG tablet, Take 1 tablet by mouth once daily, Disp: 90 tablet, Rfl: 0    NIFEdipine XL (PROCARDIA XL) 30 MG 24 hr tablet, TAKE 1 TABLET BY MOUTH EVERY DAY, Disp: 90 tablet, Rfl: 0    omeprazole (priLOSEC) 20 MG capsule, Take 1 capsule by mouth Daily., Disp: 90 capsule, Rfl: 1    sildenafil (REVATIO) 20 MG tablet, 2-5 PO as needed for sexual activity, Disp: 50 tablet, Rfl: 2     Review of Systems   Constitutional:  Negative for appetite change, chills, fatigue, fever and unexpected weight change.   HENT:  Negative for congestion, ear pain, nosebleeds, rhinorrhea and tinnitus.    Eyes:  Negative for pain.   Respiratory:  Negative for cough, chest tightness and shortness of breath.    Cardiovascular:  Negative for chest pain, palpitations and leg swelling.   Gastrointestinal:  Negative for abdominal distention, abdominal pain, blood in stool, constipation, diarrhea, nausea and vomiting.   Endocrine: Negative for cold intolerance, heat intolerance, polydipsia, polyphagia and polyuria.   Genitourinary:  Negative for dysuria, flank pain, frequency, hematuria and urgency.   Musculoskeletal:  Negative for arthralgias, back pain, gait problem, joint swelling, myalgias and neck pain.   Skin:  Negative for color change, pallor, rash and wound.   Allergic/Immunologic: Negative for environmental allergies and food allergies.   Neurological:  Negative for dizziness, syncope,  "weakness, light-headedness, numbness and headaches.   Hematological:  Negative for adenopathy. Does not bruise/bleed easily.   Psychiatric/Behavioral:  Negative for behavioral problems and suicidal ideas. The patient is not nervous/anxious.        Objective   Physical Exam  Constitutional:       Appearance: He is well-developed.   HENT:      Head: Normocephalic and atraumatic.      Right Ear: Tympanic membrane, ear canal and external ear normal. There is no impacted cerumen.      Left Ear: Tympanic membrane, ear canal and external ear normal. There is no impacted cerumen.      Nose: Nose normal. No congestion.      Mouth/Throat:      Mouth: Mucous membranes are moist.      Pharynx: Oropharynx is clear. No oropharyngeal exudate.   Eyes:      Conjunctiva/sclera: Conjunctivae normal.      Pupils: Pupils are equal, round, and reactive to light.   Cardiovascular:      Rate and Rhythm: Normal rate and regular rhythm.      Heart sounds: Normal heart sounds.   Pulmonary:      Effort: Pulmonary effort is normal.      Breath sounds: Normal breath sounds.   Abdominal:      General: Bowel sounds are normal.      Palpations: Abdomen is soft.   Musculoskeletal:         General: Normal range of motion.      Cervical back: Normal range of motion.   Skin:     General: Skin is warm and dry.   Neurological:      Mental Status: He is alert and oriented to person, place, and time.      Deep Tendon Reflexes: Reflexes are normal and symmetric.   Psychiatric:         Behavior: Behavior normal.         Thought Content: Thought content normal.         Judgment: Judgment normal.       Vitals:    04/15/24 1135 04/15/24 1205   BP: 148/96 136/86   Pulse: 95    Resp: 16    Temp: 99.1 °F (37.3 °C)    TempSrc: Temporal    SpO2: 98%    Weight: 74.1 kg (163 lb 6.4 oz)    Height: 188 cm (74.02\")          Assessment & Plan   Diagnoses and all orders for this visit:    1. Routine general medical examination at a health care facility (Primary)  -     CBC " & Differential; Future  -     Comprehensive Metabolic Panel; Future  -     Lipid Panel; Future  -     TSH; Future  -     Vitamin B12; Future  -     Vitamin D,25-Hydroxy; Future    2. Carotid stenosis, asymptomatic, left  -     CBC & Differential; Future  -     Comprehensive Metabolic Panel; Future  -     Lipid Panel; Future  -     TSH; Future  -     Vitamin B12; Future  -     Vitamin D,25-Hydroxy; Future    3. Benign essential hypertension  -     CBC & Differential; Future  -     Comprehensive Metabolic Panel; Future  -     Lipid Panel; Future  -     TSH; Future  -     Vitamin B12; Future  -     Vitamin D,25-Hydroxy; Future    4. Hypothyroidism, unspecified type  -     CBC & Differential; Future  -     Comprehensive Metabolic Panel; Future  -     Lipid Panel; Future  -     TSH; Future  -     Vitamin B12; Future  -     Vitamin D,25-Hydroxy; Future  -     T4, Free; Future    5. History of oral cancer  -     CBC & Differential; Future  -     Comprehensive Metabolic Panel; Future  -     Lipid Panel; Future  -     TSH; Future  -     Vitamin B12; Future  -     Vitamin D,25-Hydroxy; Future    6. Vitamin D deficiency  -     CBC & Differential; Future  -     Comprehensive Metabolic Panel; Future  -     Lipid Panel; Future  -     TSH; Future  -     Vitamin B12; Future  -     Vitamin D,25-Hydroxy; Future    7. Screening for prostate cancer  -     PSA Screen; Future      He is not fasting, so he will RTC for labs  No refills needed  Return in about 6 months (around 10/15/2024) for f/u.

## 2024-04-16 ENCOUNTER — LAB (OUTPATIENT)
Dept: INTERNAL MEDICINE | Facility: CLINIC | Age: 69
End: 2024-04-16
Payer: COMMERCIAL

## 2024-04-16 DIAGNOSIS — Z85.819 HISTORY OF ORAL CANCER: ICD-10-CM

## 2024-04-16 DIAGNOSIS — I65.22 CAROTID STENOSIS, ASYMPTOMATIC, LEFT: ICD-10-CM

## 2024-04-16 DIAGNOSIS — I10 BENIGN ESSENTIAL HYPERTENSION: ICD-10-CM

## 2024-04-16 DIAGNOSIS — E55.9 VITAMIN D DEFICIENCY: ICD-10-CM

## 2024-04-16 DIAGNOSIS — E03.9 HYPOTHYROIDISM, UNSPECIFIED TYPE: ICD-10-CM

## 2024-04-16 DIAGNOSIS — Z12.5 SCREENING FOR PROSTATE CANCER: ICD-10-CM

## 2024-04-16 DIAGNOSIS — Z00.00 ROUTINE GENERAL MEDICAL EXAMINATION AT A HEALTH CARE FACILITY: ICD-10-CM

## 2024-04-16 LAB
25(OH)D3 SERPL-MCNC: 31.1 NG/ML (ref 30–100)
ALBUMIN SERPL-MCNC: 4.2 G/DL (ref 3.5–5.2)
ALBUMIN/GLOB SERPL: 1.6 G/DL
ALP SERPL-CCNC: 59 U/L (ref 39–117)
ALT SERPL W P-5'-P-CCNC: 10 U/L (ref 1–41)
ANION GAP SERPL CALCULATED.3IONS-SCNC: 10.2 MMOL/L (ref 5–15)
AST SERPL-CCNC: 21 U/L (ref 1–40)
BASOPHILS # BLD AUTO: 0.07 10*3/MM3 (ref 0–0.2)
BASOPHILS NFR BLD AUTO: 0.7 % (ref 0–1.5)
BILIRUB SERPL-MCNC: 1 MG/DL (ref 0–1.2)
BUN SERPL-MCNC: 17 MG/DL (ref 8–23)
BUN/CREAT SERPL: 15.3 (ref 7–25)
CALCIUM SPEC-SCNC: 9.6 MG/DL (ref 8.6–10.5)
CHLORIDE SERPL-SCNC: 102 MMOL/L (ref 98–107)
CHOLEST SERPL-MCNC: 185 MG/DL (ref 0–200)
CO2 SERPL-SCNC: 28.8 MMOL/L (ref 22–29)
CREAT SERPL-MCNC: 1.11 MG/DL (ref 0.76–1.27)
DEPRECATED RDW RBC AUTO: 42.4 FL (ref 37–54)
EGFRCR SERPLBLD CKD-EPI 2021: 73.2 ML/MIN/1.73
EOSINOPHIL # BLD AUTO: 0.12 10*3/MM3 (ref 0–0.4)
EOSINOPHIL NFR BLD AUTO: 1.3 % (ref 0.3–6.2)
ERYTHROCYTE [DISTWIDTH] IN BLOOD BY AUTOMATED COUNT: 13.8 % (ref 12.3–15.4)
GLOBULIN UR ELPH-MCNC: 2.7 GM/DL
GLUCOSE SERPL-MCNC: 96 MG/DL (ref 65–99)
HCT VFR BLD AUTO: 49.2 % (ref 37.5–51)
HDLC SERPL-MCNC: 58 MG/DL (ref 40–60)
HGB BLD-MCNC: 16.2 G/DL (ref 13–17.7)
IMM GRANULOCYTES # BLD AUTO: 0.05 10*3/MM3 (ref 0–0.05)
IMM GRANULOCYTES NFR BLD AUTO: 0.5 % (ref 0–0.5)
LDLC SERPL CALC-MCNC: 108 MG/DL (ref 0–100)
LDLC/HDLC SERPL: 1.82 {RATIO}
LYMPHOCYTES # BLD AUTO: 1.41 10*3/MM3 (ref 0.7–3.1)
LYMPHOCYTES NFR BLD AUTO: 14.7 % (ref 19.6–45.3)
MCH RBC QN AUTO: 28 PG (ref 26.6–33)
MCHC RBC AUTO-ENTMCNC: 32.9 G/DL (ref 31.5–35.7)
MCV RBC AUTO: 85.1 FL (ref 79–97)
MONOCYTES # BLD AUTO: 0.88 10*3/MM3 (ref 0.1–0.9)
MONOCYTES NFR BLD AUTO: 9.2 % (ref 5–12)
NEUTROPHILS NFR BLD AUTO: 7.06 10*3/MM3 (ref 1.7–7)
NEUTROPHILS NFR BLD AUTO: 73.6 % (ref 42.7–76)
NRBC BLD AUTO-RTO: 0 /100 WBC (ref 0–0.2)
PLATELET # BLD AUTO: 199 10*3/MM3 (ref 140–450)
PMV BLD AUTO: 10.9 FL (ref 6–12)
POTASSIUM SERPL-SCNC: 4.4 MMOL/L (ref 3.5–5.2)
PROT SERPL-MCNC: 6.9 G/DL (ref 6–8.5)
PSA SERPL-MCNC: 1.82 NG/ML (ref 0–4)
RBC # BLD AUTO: 5.78 10*6/MM3 (ref 4.14–5.8)
SODIUM SERPL-SCNC: 141 MMOL/L (ref 136–145)
T4 FREE SERPL-MCNC: 1.97 NG/DL (ref 0.93–1.7)
TRIGL SERPL-MCNC: 107 MG/DL (ref 0–150)
TSH SERPL DL<=0.05 MIU/L-ACNC: 0.91 UIU/ML (ref 0.27–4.2)
VIT B12 BLD-MCNC: 453 PG/ML (ref 211–946)
VLDLC SERPL-MCNC: 19 MG/DL (ref 5–40)
WBC NRBC COR # BLD AUTO: 9.59 10*3/MM3 (ref 3.4–10.8)

## 2024-04-16 PROCEDURE — 84439 ASSAY OF FREE THYROXINE: CPT | Performed by: NURSE PRACTITIONER

## 2024-04-16 PROCEDURE — 36415 COLL VENOUS BLD VENIPUNCTURE: CPT | Performed by: NURSE PRACTITIONER

## 2024-04-16 PROCEDURE — 82306 VITAMIN D 25 HYDROXY: CPT | Performed by: NURSE PRACTITIONER

## 2024-04-16 PROCEDURE — 80061 LIPID PANEL: CPT | Performed by: NURSE PRACTITIONER

## 2024-04-16 PROCEDURE — G0103 PSA SCREENING: HCPCS | Performed by: NURSE PRACTITIONER

## 2024-04-16 PROCEDURE — 82607 VITAMIN B-12: CPT | Performed by: NURSE PRACTITIONER

## 2024-04-16 PROCEDURE — 80050 GENERAL HEALTH PANEL: CPT | Performed by: NURSE PRACTITIONER

## 2024-04-18 ENCOUNTER — HOSPITAL ENCOUNTER (INPATIENT)
Facility: HOSPITAL | Age: 69
LOS: 1 days | Discharge: HOME | End: 2024-04-19
Attending: RADIOLOGY | Admitting: RADIOLOGY
Payer: COMMERCIAL

## 2024-04-18 ENCOUNTER — APPOINTMENT (OUTPATIENT)
Dept: CARDIOLOGY | Facility: HOSPITAL | Age: 69
End: 2024-04-18
Payer: COMMERCIAL

## 2024-04-18 DIAGNOSIS — I65.22 CAROTID STENOSIS, ASYMPTOMATIC, LEFT: ICD-10-CM

## 2024-04-18 PROBLEM — I65.29 CAROTID ARTERY STENOSIS: Status: ACTIVE | Noted: 2024-04-18

## 2024-04-18 PROBLEM — R13.10 DYSPHAGIA: Status: ACTIVE | Noted: 2024-04-18

## 2024-04-18 LAB
BH CV LEFT MID CCA HIDDEN LRR: 1
BH CV LEFT PROX CCA HIDDEN LRR: 1
BH CV VAS CAROTID LEFT DISTAL STENT EDV: 30.1 CM/S
BH CV VAS CAROTID LEFT DISTAL STENT: 106 CM/S
BH CV VAS CAROTID LEFT DISTAL TO STENT EDV: 29.4 CM/S
BH CV VAS CAROTID LEFT DISTAL TO STENT: 96.7 CM/S
BH CV VAS CAROTID LEFT MID STENT EDV: 25.9 CM/S
BH CV VAS CAROTID LEFT MID STENT: 100 CM/S
BH CV VAS CAROTID LEFT PROXIMAL STENT EDV: 21.7 CM/S
BH CV VAS CAROTID LEFT PROXIMAL STENT: 79.5 CM/S
BH CV VAS CAROTID LEFT PROXIMAL TO STENT: 107 CM/S
BH CV VAS CAROTID LEFT STENT NATIVE VESSEL PROXIMAL ED: 25.9 CM/S
BH CV XLRA MEAS LEFT DIST CCA EDV: 31.5 CM/SEC
BH CV XLRA MEAS LEFT DIST CCA PSV: 117 CM/SEC
BH CV XLRA MEAS LEFT DIST ICA PSV: 91.9 CM/SEC
BH CV XLRA MEAS LEFT ICA/CCA RATIO: 0.83
BH CV XLRA MEAS LEFT MID CCA EDV: 25.9 CM/SEC
BH CV XLRA MEAS LEFT MID CCA PSV: 100 CM/SEC
BH CV XLRA MEAS LEFT MID ICA EDV: 28 CM/SEC
BH CV XLRA MEAS LEFT MID ICA PSV: 88.3 CM/SEC
BH CV XLRA MEAS LEFT PROX CCA EDV: 25.9 CM/SEC
BH CV XLRA MEAS LEFT PROX CCA PSV: 107 CM/SEC
BH CV XLRA MEAS LEFT PROX ECA EDV: 21.7 CM/SEC
BH CV XLRA MEAS LEFT PROX ECA PSV: 82 CM/SEC
BH CV XLRA MEAS LEFT PROX ICA EDV: 15.4 CM/SEC
BH CV XLRA MEAS LEFT PROX ICA PSV: 63.8 CM/SEC
BH CV XLRA MEAS LEFT PROX SCLA PSV: 149 CM/SEC
BH CV XLRA MEAS RIGHT DIST CCA EDV: 21 CM/SEC
BH CV XLRA MEAS RIGHT DIST CCA PSV: 96 CM/SEC
BH CV XLRA MEAS RIGHT DIST ICA EDV: 35.8 CM/SEC
BH CV XLRA MEAS RIGHT DIST ICA PSV: 113 CM/SEC
BH CV XLRA MEAS RIGHT ICA/CCA RATIO: 1.04
BH CV XLRA MEAS RIGHT MID CCA EDV: 23.1 CM/SEC
BH CV XLRA MEAS RIGHT MID CCA PSV: 85.5 CM/SEC
BH CV XLRA MEAS RIGHT MID ICA EDV: 31.1 CM/SEC
BH CV XLRA MEAS RIGHT MID ICA PSV: 91.3 CM/SEC
BH CV XLRA MEAS RIGHT PROX CCA EDV: 18.8 CM/SEC
BH CV XLRA MEAS RIGHT PROX CCA PSV: 111 CM/SEC
BH CV XLRA MEAS RIGHT PROX ECA EDV: 19.4 CM/SEC
BH CV XLRA MEAS RIGHT PROX ECA PSV: 109 CM/SEC
BH CV XLRA MEAS RIGHT PROX ICA EDV: 32.9 CM/SEC
BH CV XLRA MEAS RIGHT PROX ICA PSV: 100 CM/SEC
BH CV XLRA MEAS RIGHT PROX SCLA PSV: 215 CM/SEC
BH CV XLRA MEAS RIGHT VERTEBRAL A EDV: 19.2 CM/SEC
BH CV XLRA MEAS RIGHT VERTEBRAL A PSV: 57.5 CM/SEC
LEFT ARM BP: NORMAL MMHG
QT INTERVAL: 356 MS
QTC INTERVAL: 435 MS

## 2024-04-18 PROCEDURE — C1769 GUIDE WIRE: HCPCS | Performed by: RADIOLOGY

## 2024-04-18 PROCEDURE — C1876 STENT, NON-COA/NON-COV W/DEL: HCPCS | Performed by: RADIOLOGY

## 2024-04-18 PROCEDURE — 0 IODIXANOL PER 1 ML: Performed by: RADIOLOGY

## 2024-04-18 PROCEDURE — B31GYZZ FLUOROSCOPY OF BILATERAL VERTEBRAL ARTERIES USING OTHER CONTRAST: ICD-10-PCS | Performed by: RADIOLOGY

## 2024-04-18 PROCEDURE — 99221 1ST HOSP IP/OBS SF/LOW 40: CPT | Performed by: INTERNAL MEDICINE

## 2024-04-18 PROCEDURE — B31RYZZ FLUOROSCOPY OF INTRACRANIAL ARTERIES USING OTHER CONTRAST: ICD-10-PCS | Performed by: RADIOLOGY

## 2024-04-18 PROCEDURE — 36225 PLACE CATH SUBCLAVIAN ART: CPT | Performed by: RADIOLOGY

## 2024-04-18 PROCEDURE — 99153 MOD SED SAME PHYS/QHP EA: CPT | Performed by: RADIOLOGY

## 2024-04-18 PROCEDURE — 93880 EXTRACRANIAL BILAT STUDY: CPT | Performed by: INTERNAL MEDICINE

## 2024-04-18 PROCEDURE — 93880 EXTRACRANIAL BILAT STUDY: CPT

## 2024-04-18 PROCEDURE — 25010000002 NICARDIPINE 2.5 MG/ML SOLUTION: Performed by: RADIOLOGY

## 2024-04-18 PROCEDURE — 25010000002 LIDOCAINE 1 % SOLUTION: Performed by: RADIOLOGY

## 2024-04-18 PROCEDURE — 36223 PLACE CATH CAROTID/INOM ART: CPT | Performed by: RADIOLOGY

## 2024-04-18 PROCEDURE — 93005 ELECTROCARDIOGRAM TRACING: CPT | Performed by: RADIOLOGY

## 2024-04-18 PROCEDURE — 99232 SBSQ HOSP IP/OBS MODERATE 35: CPT

## 2024-04-18 PROCEDURE — 25010000002 FENTANYL CITRATE (PF) 50 MCG/ML SOLUTION: Performed by: RADIOLOGY

## 2024-04-18 PROCEDURE — 25810000003 SODIUM CHLORIDE 0.9 % SOLUTION

## 2024-04-18 PROCEDURE — 37215 TRANSCATH STENT CCA W/EPS: CPT | Performed by: RADIOLOGY

## 2024-04-18 PROCEDURE — 25010000002 HEPARIN (PORCINE) PER 1000 UNITS: Performed by: RADIOLOGY

## 2024-04-18 PROCEDURE — C1894 INTRO/SHEATH, NON-LASER: HCPCS | Performed by: RADIOLOGY

## 2024-04-18 PROCEDURE — C1725 CATH, TRANSLUMIN NON-LASER: HCPCS | Performed by: RADIOLOGY

## 2024-04-18 PROCEDURE — 037J3DZ DILATION OF LEFT COMMON CAROTID ARTERY WITH INTRALUMINAL DEVICE, PERCUTANEOUS APPROACH: ICD-10-PCS | Performed by: RADIOLOGY

## 2024-04-18 PROCEDURE — 25810000003 SODIUM CHLORIDE 0.9 % SOLUTION: Performed by: RADIOLOGY

## 2024-04-18 PROCEDURE — B313YZZ FLUOROSCOPY OF RIGHT COMMON CAROTID ARTERY USING OTHER CONTRAST: ICD-10-PCS | Performed by: RADIOLOGY

## 2024-04-18 PROCEDURE — 36226 PLACE CATH VERTEBRAL ART: CPT | Performed by: RADIOLOGY

## 2024-04-18 PROCEDURE — C1884 EMBOLIZATION PROTECT SYST: HCPCS | Performed by: RADIOLOGY

## 2024-04-18 PROCEDURE — 99152 MOD SED SAME PHYS/QHP 5/>YRS: CPT | Performed by: RADIOLOGY

## 2024-04-18 PROCEDURE — 25010000002 MIDAZOLAM PER 1 MG: Performed by: RADIOLOGY

## 2024-04-18 PROCEDURE — 76937 US GUIDE VASCULAR ACCESS: CPT

## 2024-04-18 DEVICE — XACT CAROTID STENT SYSTEM 10.0 MM X 30 MM STRAIGHT
Type: IMPLANTABLE DEVICE | Status: FUNCTIONAL
Brand: XACT

## 2024-04-18 RX ORDER — SODIUM CHLORIDE 0.9 % (FLUSH) 0.9 %
10 SYRINGE (ML) INJECTION EVERY 12 HOURS SCHEDULED
Status: DISCONTINUED | OUTPATIENT
Start: 2024-04-18 | End: 2024-04-19 | Stop reason: HOSPADM

## 2024-04-18 RX ORDER — MIDAZOLAM HYDROCHLORIDE 1 MG/ML
INJECTION INTRAMUSCULAR; INTRAVENOUS
Status: DISCONTINUED | OUTPATIENT
Start: 2024-04-18 | End: 2024-04-18 | Stop reason: HOSPADM

## 2024-04-18 RX ORDER — PANTOPRAZOLE SODIUM 40 MG/1
40 TABLET, DELAYED RELEASE ORAL
Status: DISCONTINUED | OUTPATIENT
Start: 2024-04-18 | End: 2024-04-19 | Stop reason: HOSPADM

## 2024-04-18 RX ORDER — IODIXANOL 320 MG/ML
INJECTION, SOLUTION INTRAVASCULAR
Status: DISCONTINUED | OUTPATIENT
Start: 2024-04-18 | End: 2024-04-18 | Stop reason: HOSPADM

## 2024-04-18 RX ORDER — SODIUM CHLORIDE 9 MG/ML
75 INJECTION, SOLUTION INTRAVENOUS CONTINUOUS
Status: ACTIVE | OUTPATIENT
Start: 2024-04-18 | End: 2024-04-18

## 2024-04-18 RX ORDER — LIDOCAINE HYDROCHLORIDE 10 MG/ML
INJECTION, SOLUTION INFILTRATION; PERINEURAL
Status: DISCONTINUED | OUTPATIENT
Start: 2024-04-18 | End: 2024-04-18 | Stop reason: HOSPADM

## 2024-04-18 RX ORDER — ASPIRIN 81 MG/1
81 TABLET, CHEWABLE ORAL DAILY
Status: DISCONTINUED | OUTPATIENT
Start: 2024-04-18 | End: 2024-04-19 | Stop reason: HOSPADM

## 2024-04-18 RX ORDER — FENTANYL CITRATE 50 UG/ML
INJECTION, SOLUTION INTRAMUSCULAR; INTRAVENOUS
Status: DISCONTINUED | OUTPATIENT
Start: 2024-04-18 | End: 2024-04-18 | Stop reason: HOSPADM

## 2024-04-18 RX ORDER — SODIUM CHLORIDE 9 MG/ML
40 INJECTION, SOLUTION INTRAVENOUS AS NEEDED
Status: DISCONTINUED | OUTPATIENT
Start: 2024-04-18 | End: 2024-04-18

## 2024-04-18 RX ORDER — SODIUM CHLORIDE 0.9 % (FLUSH) 0.9 %
10 SYRINGE (ML) INJECTION AS NEEDED
Status: DISCONTINUED | OUTPATIENT
Start: 2024-04-18 | End: 2024-04-19 | Stop reason: HOSPADM

## 2024-04-18 RX ORDER — NICARDIPINE HCL-0.9% SOD CHLOR 1 MG/10 ML
SYRINGE (ML) INTRAVENOUS
Status: DISCONTINUED | OUTPATIENT
Start: 2024-04-18 | End: 2024-04-18 | Stop reason: HOSPADM

## 2024-04-18 RX ORDER — HEPARIN SODIUM 1000 [USP'U]/ML
INJECTION, SOLUTION INTRAVENOUS; SUBCUTANEOUS
Status: DISCONTINUED | OUTPATIENT
Start: 2024-04-18 | End: 2024-04-18 | Stop reason: HOSPADM

## 2024-04-18 RX ORDER — SODIUM CHLORIDE 0.9 % (FLUSH) 0.9 %
1-10 SYRINGE (ML) INJECTION AS NEEDED
Status: DISCONTINUED | OUTPATIENT
Start: 2024-04-18 | End: 2024-04-18

## 2024-04-18 RX ORDER — CLOPIDOGREL BISULFATE 75 MG/1
75 TABLET ORAL DAILY
Status: DISCONTINUED | OUTPATIENT
Start: 2024-04-18 | End: 2024-04-19 | Stop reason: HOSPADM

## 2024-04-18 RX ORDER — LEVOTHYROXINE SODIUM 112 UG/1
112 TABLET ORAL DAILY
Status: DISCONTINUED | OUTPATIENT
Start: 2024-04-18 | End: 2024-04-19 | Stop reason: HOSPADM

## 2024-04-18 RX ORDER — SODIUM CHLORIDE 0.9 % (FLUSH) 0.9 %
10 SYRINGE (ML) INJECTION EVERY 12 HOURS SCHEDULED
Status: DISCONTINUED | OUTPATIENT
Start: 2024-04-18 | End: 2024-04-18

## 2024-04-18 RX ORDER — SODIUM CHLORIDE 9 MG/ML
50 INJECTION, SOLUTION INTRAVENOUS CONTINUOUS
Status: DISCONTINUED | OUTPATIENT
Start: 2024-04-18 | End: 2024-04-19 | Stop reason: HOSPADM

## 2024-04-18 RX ORDER — SODIUM CHLORIDE 9 MG/ML
40 INJECTION, SOLUTION INTRAVENOUS AS NEEDED
Status: DISCONTINUED | OUTPATIENT
Start: 2024-04-18 | End: 2024-04-19 | Stop reason: HOSPADM

## 2024-04-18 RX ORDER — NIFEDIPINE 30 MG/1
30 TABLET, EXTENDED RELEASE ORAL DAILY
Status: DISCONTINUED | OUTPATIENT
Start: 2024-04-18 | End: 2024-04-19 | Stop reason: HOSPADM

## 2024-04-18 RX ADMIN — Medication 10 ML: at 10:52

## 2024-04-18 RX ADMIN — SODIUM CHLORIDE 75 ML/HR: 9 INJECTION, SOLUTION INTRAVENOUS at 10:52

## 2024-04-18 RX ADMIN — Medication 10 ML: at 20:32

## 2024-04-18 NOTE — PROGRESS NOTES
Intensive Care Follow-up     Hospital:  LOS: 0 days    Mr. Boone Riley, 68 y.o. male is followed for: Glycemic, Electrolyte, Respiratory, and Medical management      Subjective   Interval History:  Patient alert and oriented, pleasant and conversant, sitting up in the bed. Wife at bedside.     Denies pain, nausea/vomiting, shortness of breath, or neurological symptoms. Hemodynamically stable.     The patient's past medical, surgical and social history were reviewed and updated in Epic as appropriate.     Objective     Infusions:  niCARdipine, 5-15 mg/hr  phenylephrine, 0.5-3 mcg/kg/min  sodium chloride, 50 mL/hr  sodium chloride, 75 mL/hr, Last Rate: 75 mL/hr (04/18/24 1052)      Medications:  aspirin, 81 mg, Oral, Daily  clopidogrel, 75 mg, Oral, Daily  levothyroxine, 112 mcg, Oral, Daily  NIFEdipine XL, 30 mg, Oral, Daily  pantoprazole, 40 mg, Oral, Q AM  sodium chloride, 10 mL, Intravenous, Q12H      I reviewed the patient's medications.    Vital Sign Min/Max for last 24 hours  Temp  Min: 98.9 °F (37.2 °C)  Max: 98.9 °F (37.2 °C)   BP  Min: 83/73  Max: 171/102   Pulse  Min: 83  Max: 112   Resp  Min: 16  Max: 16   SpO2  Min: 97 %  Max: 100 %   No data recorded       Input/Output for last 24 hour shift  No intake/output data recorded.      Physical Exam:  GENERAL: Patient lying in bed and conversant. No acute distress.   HEENT: Normocephalic and atraumatic. Prior left radial neck dissection. Trachea midline. PER. EOM WNL.   LUNGS: Chest rise of normal depth and symmetric. Lungs clear to auscultation bilaterally. No wheezes, rhonchi, or rales.   HEART: S1,S2 detected. Regular rate and rhythm. First degree AVB with PACs. No rub, murmur, or gallop.   ABDOMEN: Soft, round, nondistended, and nontender. Bowel sounds present.   EXTREMITIES: No clubbing, edema, or cyanosis. Peripheral pulses present. Skin warm and dry. Right radial TR band in place.   NEURO/PSYCH: Alert and oriented. Follows commands. Moves all  extremities. No focal deficits.      Results from last 7 days   Lab Units 04/16/24  0807   WBC 10*3/mm3 9.59   HEMOGLOBIN g/dL 16.2   PLATELETS 10*3/mm3 199     Results from last 7 days   Lab Units 04/16/24  0807   SODIUM mmol/L 141   POTASSIUM mmol/L 4.4   CO2 mmol/L 28.8   BUN mg/dL 17   CREATININE mg/dL 1.11   GLUCOSE mg/dL 96     Estimated Creatinine Clearance: 68 mL/min (by C-G formula based on SCr of 1.11 mg/dL).        Assessment & Plan   Impression      Carotid stenosis, asymptomatic, left    Paroxysmal supraventricular tachycardia    Hypothyroidism    Esophageal reflux    Benign essential hypertension    Dysphagia       Plan        Boone Riley is a 68 y.o. male with PMH head/neck cancer s/p left modified radical neck dissection and radiation therapy, BPH, dysphagia, GERD, hypothyroidism, HLD, paroxysmal SCT s/p cardiac ablation, and HTN who presents to [facility] 4/18/2024 for an elective left carotid stent per Dr. Ballesteros.      Patient underwent carotid duplex and CT angiogram of the neck on 3/28/24. Carotid duplex demonstrated elevated peak systolic velocities within the mid-cervical left common carotid artery concerning for high-grade stenosis (greater than 70%). CT angiogram showed eccentric lipid-laden and calcific plaque within the mid-cervical left common carotid artery with mild plaque formation at the carotid bifurcations without hemodynamically significant disease.      After discussing risks and benefits, patient elected to undergo left carotid stenting per Dr. Ballesteros on 4/18/24. He is seen in the ICU post-procedure.     # Carotid stenosis, asymptomatic, left s/p stenting per Dr. Ballesteros on 4/18/24 via right radial approach  Post-procedure orders per NS  Neurovascular and peripheral vascular checks per protocol  ASA/Plavix  Nicardipine and phenylephrine for SBP  or MAP > 65  Remove TR band per protocol  Post-procedure left carotid duplex  AM labs    # Hypothyroidism   Continue home  levothyroxine    # Esophageal reflux  # Dysphagia  Followed outpatient by ENT  No diet modifications required  Continue home PPI      # Benign essential hypertension  # Paroxysmal SVT s/p ablation in [id number]  Continue home nifedipine  Dr. Ballesteros placed Cardiology consult for irregular rhythm  EKG 4/18/24 showed sinus rhythm with 1st degree AV block and PACs    DVT Prophylaxis: Mechanical  GI Prophylaxis: PPI  Dispo: ICU    Critical Care time spent in direct patient care: 35 minutes (excluding procedure time, if applicable) including high complexity decision making to assess, manipulate, and support vital organ system failure in this individual who has impairment of one or more vital organ systems such that there is a high probability of imminent or life threatening deterioration in the patient's condition.     Stephenie Tom, MSN, APRN, ACNPC-AG  Pulmonary and Critical Care Medicine  Electronically signed by KIERSTEN Miranda, 04/18/24, 10:42 AM EDT.

## 2024-04-18 NOTE — BRIEF OP NOTE
CV CAROTID STENT  Progress Note    Boone Riley  4/18/2024    Pre-op Diagnosis:   Carotid stenosis, asymptomatic, left [I65.22]       Post-Op Diagnosis Codes:     * Carotid stenosis, asymptomatic, left [I65.22]    Procedure/CPT® Codes:        Procedure(s):  Left Carotid Stent              Surgeon(s):  Ciro Ballesteros MD    Anesthesia: Sedation    Staff:   Documenter: Tami Page  Invasive Nurse: Harini De Leon RN  Invasive Technologist: Tong Vieira         Estimated Blood Loss: minimal    Urine Voided: * No values recorded between 4/18/2024  8:57 AM and 4/18/2024  9:54 AM *    Specimens:                None          Drains: * No LDAs found *    Findings:   There is a high-grade, 70% stenosis involving the mid cervical left common carotid artery.  This lesion responded well to angioplasty/stent placement, restoring a near normal caliber lumen and without appreciable residual stenosis.  There is normal (TICI 3) flow to the left cerebral hemisphere.      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        Complications: None apparent.          Ciro Ballesteros MD     Date: 4/18/2024  Time: 10:05 EDT

## 2024-04-18 NOTE — CONSULTS
[facility] at [facility]  CARDIOLOGY CONSULTATION NOTE    Boone Riley  : 1955  MRN:[id number]  Home Phone:[phone]    Date of Admission:2024  Date of Consultation: 24  Primary Provider:  Yesy Kearney APRN    Referring Provider: Ciro Ballesteros MD  Reason for Consultation: Paroxysmal SVT    IDENTIFICATION: A 68 y.o. male from [facility]    CC: Carotid Stenosis      PROBLEM LIST:   Paroxysmal SVT ?  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 Ballesteros  Hypertension  Dyslipidemia  Hypothyroidism  History of oral cancer  S/p left modified radical neck dissection and radiation therapy        ALLERGIES: No Known Allergies    HPI:   Mr. Riley is a 68-year-old male with the above medical history who we are seeing in consultation for paroxysmal SVT.  He underwent stenting to left carotid artery today by Dr. Ballesteros and was noted to have paroxysmal unknown narrow complex tachyarrhythmia. The patient underwent some sort of cardiac ablation at [facility] 15 years ago but doesn't remember who performed the procedure and is not sure what kind of ablation was performed. He was not started on anticoagulation and does not think it was atrial fibrillation. The patient does have an impressive first degree AV block and common PACs at baseline. He fell out of follow up with cardiology shortly after the ablation and has had no cardiac-related issues that he is aware of. His  carotid stenosis is likely from radiation he underwent 15 years ago for his oral/neck cancer. He has occasionally had a fluttering sensation over the last 15 years but these episodes have been brief and nonlimiting for him. He has not had these for at least 6 months. There is some telemetry that was marked as afib this  morning around 830 but there are clearly p-waves and appears to be sinus rhythm with PACs. He denies any cardiac testing like echocardiograms, stress tests, ambulatory monitors or catheterizations since his ablation. He denies any chest pain, palpitations, shortness of breath, lower extremity edema, orthopnea or lightheadedness/dizziness.      ROS: All systems have been reviewed and are negative with the exception of those mentioned in the HPI and problem list above.    HOME MEDICINES:   Current Outpatient Medications   Medication Instructions    aspirin 81 mg, Oral, Daily    clopidogrel (PLAVIX) 75 mg, Oral, Daily, Start taking 5 days prior to the procedure    levothyroxine (SYNTHROID, LEVOTHROID) 112 mcg, Oral, Daily    NIFEdipine XL (PROCARDIA XL) 30 mg, Oral, Daily    omeprazole (PRILOSEC) 20 mg, Oral, Daily    sildenafil (REVATIO) 20 MG tablet 2-5 PO as needed for sexual activity       Surgical History:   Past Surgical History:   Procedure Laterality Date    CARDIAC ABLATION  2008 or 2009?    COLONOSCOPY  2008    due 2018    ENDOSCOPY  01/2015    Dr. Gallo- [facility]    NECK DISSECTION      Dr. CAMACHO    PROSTATE SURGERY  07/20/2022    [facility]       Social History:   Social History     Socioeconomic History    Marital status:    Tobacco Use    Smoking status: Never     Passive exposure: Never    Smokeless tobacco: Never   Vaping Use    Vaping status: Never Used   Substance and Sexual Activity    Alcohol use: No    Drug use: No    Sexual activity: Yes     Partners: Female     Birth control/protection: None     Comment:        Family History:   Family History   Problem Relation Age of Onset    Breast cancer Mother     Cancer Mother         Breast Cancer    Early death Mother         Due to untreated breast cancer, age 53    Lung cancer Father     Alcohol abuse Father     Cancer Father         Lung cancer, lifetime smoker    Diabetes Brother         Type 2 onset approx age 55       Objective     BP  "115/85 (BP Location: Left arm, Patient Position: Lying)   Pulse 71   Temp 98.4 °F (36.9 °C) (Axillary)   Resp 16   Ht 188 cm (74\")   Wt 73.4 kg (161 lb 12.8 oz)   SpO2 94%   BMI 20.77 kg/m²   No intake or output data in the 24 hours ending 04/18/24 1215    PHYSICAL EXAM:  CONSTITUTIONAL: Well nourished, cooperative, in no acute distress  CARDIOVASCULAR:  Regular rhythm and normal rate, no murmur, gallop, rub.   RESPIRATORY: Clear to auscultation, normal respiratory effort, no wheezing, rales or rhonchi  EXTREMITIES: No gross deformities, no edema. Varicosities noted. Peripheral pulses are present and equal bilaterally  SKIN: Warm, dry. No bleeding, bruising or rash  NEUROLOGICAL: No gross focal deficits  PSYCHIATRIC: Normal mood and affect. Behavior is normal     Labs/Diagnostic Data  Results from last 7 days   Lab Units 04/16/24  0807   SODIUM mmol/L 141   POTASSIUM mmol/L 4.4   CHLORIDE mmol/L 102   CO2 mmol/L 28.8   BUN mg/dL 17   CREATININE mg/dL 1.11   GLUCOSE mg/dL 96   CALCIUM mg/dL 9.6         Results from last 7 days   Lab Units 04/16/24  0807   WBC 10*3/mm3 9.59   HEMOGLOBIN g/dL 16.2   HEMATOCRIT % 49.2   PLATELETS 10*3/mm3 199         Results from last 7 days   Lab Units 04/16/24  0807   CHOLESTEROL mg/dL 185   TRIGLYCERIDES mg/dL 107   HDL CHOL mg/dL 58   LDL CHOL mg/dL 108*     Results from last 7 days   Lab Units 04/16/24  0807   TSH uIU/mL 0.911   FREE T4 ng/dL 1.97*                   EKG 4/18/24 sinus rhythm with 1st degree AV block and RSR pattern in v1, PACs    Radiology Data:   No radiology results for the last 7 days      Current Medications:    aspirin, 81 mg, Oral, Daily  clopidogrel, 75 mg, Oral, Daily  levothyroxine, 112 mcg, Oral, Daily  NIFEdipine XL, 30 mg, Oral, Daily  pantoprazole, 40 mg, Oral, Q AM  sodium chloride, 10 mL, Intravenous, Q12H      niCARdipine, 5-15 mg/hr  phenylephrine, 0.5-3 mcg/kg/min  sodium chloride, 50 mL/hr  sodium chloride, 75 mL/hr, Last Rate: 75 mL/hr " (04/18/24 1052)        Assessment  Paroxysmal SVT  S/p RFA at [facility]  Carotid Stenosis s/p L carotid stent 4/18/2024  History of head/throat cancer 2015 s/p L modified radical neck dissection and radiation therapy      Plan  Will reach out to [facility] for records on ablation from ~2009  Continue to monitor on telemetry for arrhythmia      Scribed by Rajinder Cummins PA-C for Dr. Joon Mcdonnell MD    I personally performed the services described in this documentation as scribed by the above named individual in my presence, and it is both accurate and complete.    JESSICA Mcdonnell MD  04/19/24  09:42 EDT

## 2024-04-19 ENCOUNTER — READMISSION MANAGEMENT (OUTPATIENT)
Dept: CALL CENTER | Facility: HOSPITAL | Age: 69
End: 2024-04-19
Payer: COMMERCIAL

## 2024-04-19 VITALS
WEIGHT: 161 LBS | OXYGEN SATURATION: 96 % | HEART RATE: 69 BPM | SYSTOLIC BLOOD PRESSURE: 136 MMHG | DIASTOLIC BLOOD PRESSURE: 88 MMHG | BODY MASS INDEX: 20.66 KG/M2 | RESPIRATION RATE: 16 BRPM | HEIGHT: 74 IN | TEMPERATURE: 98 F

## 2024-04-19 DIAGNOSIS — R00.2 PALPITATIONS: Primary | ICD-10-CM

## 2024-04-19 LAB
ANION GAP SERPL CALCULATED.3IONS-SCNC: 8 MMOL/L (ref 5–15)
BASOPHILS # BLD AUTO: 0.05 10*3/MM3 (ref 0–0.2)
BASOPHILS NFR BLD AUTO: 0.8 % (ref 0–1.5)
BUN SERPL-MCNC: 12 MG/DL (ref 8–23)
BUN/CREAT SERPL: 17.1 (ref 7–25)
CALCIUM SPEC-SCNC: 8.6 MG/DL (ref 8.6–10.5)
CHLORIDE SERPL-SCNC: 103 MMOL/L (ref 98–107)
CO2 SERPL-SCNC: 25 MMOL/L (ref 22–29)
CREAT SERPL-MCNC: 0.7 MG/DL (ref 0.76–1.27)
DEPRECATED RDW RBC AUTO: 40.7 FL (ref 37–54)
EGFRCR SERPLBLD CKD-EPI 2021: 101.6 ML/MIN/1.73
EOSINOPHIL # BLD AUTO: 0.17 10*3/MM3 (ref 0–0.4)
EOSINOPHIL NFR BLD AUTO: 2.6 % (ref 0.3–6.2)
ERYTHROCYTE [DISTWIDTH] IN BLOOD BY AUTOMATED COUNT: 13.3 % (ref 12.3–15.4)
GLUCOSE SERPL-MCNC: 90 MG/DL (ref 65–99)
HCT VFR BLD AUTO: 45 % (ref 37.5–51)
HGB BLD-MCNC: 15.1 G/DL (ref 13–17.7)
IMM GRANULOCYTES # BLD AUTO: 0.03 10*3/MM3 (ref 0–0.05)
IMM GRANULOCYTES NFR BLD AUTO: 0.5 % (ref 0–0.5)
LYMPHOCYTES # BLD AUTO: 0.9 10*3/MM3 (ref 0.7–3.1)
LYMPHOCYTES NFR BLD AUTO: 13.5 % (ref 19.6–45.3)
MAGNESIUM SERPL-MCNC: 2 MG/DL (ref 1.6–2.4)
MCH RBC QN AUTO: 28 PG (ref 26.6–33)
MCHC RBC AUTO-ENTMCNC: 33.6 G/DL (ref 31.5–35.7)
MCV RBC AUTO: 83.5 FL (ref 79–97)
MONOCYTES # BLD AUTO: 1.09 10*3/MM3 (ref 0.1–0.9)
MONOCYTES NFR BLD AUTO: 16.4 % (ref 5–12)
NEUTROPHILS NFR BLD AUTO: 4.41 10*3/MM3 (ref 1.7–7)
NEUTROPHILS NFR BLD AUTO: 66.2 % (ref 42.7–76)
NRBC BLD AUTO-RTO: 0 /100 WBC (ref 0–0.2)
PHOSPHATE SERPL-MCNC: 2.1 MG/DL (ref 2.5–4.5)
PLATELET # BLD AUTO: 160 10*3/MM3 (ref 140–450)
PMV BLD AUTO: 10.2 FL (ref 6–12)
POTASSIUM SERPL-SCNC: 3.8 MMOL/L (ref 3.5–5.2)
RBC # BLD AUTO: 5.39 10*6/MM3 (ref 4.14–5.8)
SODIUM SERPL-SCNC: 136 MMOL/L (ref 136–145)
WBC NRBC COR # BLD AUTO: 6.65 10*3/MM3 (ref 3.4–10.8)

## 2024-04-19 PROCEDURE — 25010000002 NICARDIPINE 2.5 MG/ML SOLUTION 10 ML VIAL: Performed by: RADIOLOGY

## 2024-04-19 PROCEDURE — 80048 BASIC METABOLIC PNL TOTAL CA: CPT

## 2024-04-19 PROCEDURE — 99231 SBSQ HOSP IP/OBS SF/LOW 25: CPT | Performed by: INTERNAL MEDICINE

## 2024-04-19 PROCEDURE — 84100 ASSAY OF PHOSPHORUS: CPT

## 2024-04-19 PROCEDURE — 83735 ASSAY OF MAGNESIUM: CPT

## 2024-04-19 PROCEDURE — 85025 COMPLETE CBC W/AUTO DIFF WBC: CPT

## 2024-04-19 PROCEDURE — 25810000003 SODIUM CHLORIDE 0.9 % SOLUTION 250 ML FLEX CONT: Performed by: RADIOLOGY

## 2024-04-19 RX ADMIN — LEVOTHYROXINE SODIUM 112 MCG: 0.11 TABLET ORAL at 08:13

## 2024-04-19 RX ADMIN — PANTOPRAZOLE SODIUM 40 MG: 40 TABLET, DELAYED RELEASE ORAL at 06:27

## 2024-04-19 RX ADMIN — NIFEDIPINE 30 MG: 30 TABLET, EXTENDED RELEASE ORAL at 08:12

## 2024-04-19 RX ADMIN — NICARDIPINE HYDROCHLORIDE 2.5 MG/HR: 25 INJECTION, SOLUTION INTRAVENOUS at 01:08

## 2024-04-19 RX ADMIN — Medication 10 ML: at 08:13

## 2024-04-19 RX ADMIN — CLOPIDOGREL BISULFATE 75 MG: 75 TABLET ORAL at 08:12

## 2024-04-19 RX ADMIN — ASPIRIN 81 MG: 81 TABLET, CHEWABLE ORAL at 08:12

## 2024-04-19 NOTE — OUTREACH NOTE
Prep Survey      Flowsheet Row Responses   [facility] patient discharged from? [place]   Is LACE score < 7 ? No   Eligibility [facility]   Date of Admission 04/18/24   Date of Discharge 04/19/24   Discharge Disposition Home or Self Care   Discharge diagnosis Carotid stenosis, asymptomatic, left   Does the patient have one of the following disease processes/diagnoses(primary or secondary)? General Surgery   Does the patient have Home health ordered? No   Is there a DME ordered? No   Prep survey completed? Yes            Marielle CASILLAS - Registered Nurse

## 2024-04-19 NOTE — PLAN OF CARE
Goal Outcome Evaluation:      Pt A&Ox4, VSS, RA, Sinus rhythm w/1st degree block and frequent PACs, cardene used intermittently to maintain SBP <150, NIH 0. No complaints of pain or neurologic changes overnight. Anticipated d/c later this shift.

## 2024-04-19 NOTE — PROGRESS NOTES
[facility]  Inpatient Progress Note      Chief Complaint/Reason for consult:  abnormal ECG         Subjective  Feeling well overnight, no acute issues overnight         Vital Sign Min/Max for last 24 hours  Temp  Min: 97.9 °F (36.6 °C)  Max: 98.5 °F (36.9 °C)   BP  Min: 83/73  Max: 174/97   Pulse  Min: 71  Max: 97   Resp  Min: 16  Max: 20   SpO2  Min: 94 %  Max: 100 %   No data recorded      Intake/Output Summary (Last 24 hours) at 4/19/2024 0830  Last data filed at 4/19/2024 0700  Gross per 24 hour   Intake 1258.9 ml   Output 2505 ml   Net -1246.1 ml           Gen: well developed, lying in bed, comfortable appearing  CV: regular rate, regular rhythm  Pulm: RA, normal work of breathing,   Ext: normal bulk for age, normal tone, no dependent edema  Neuro: alert, oriented, face symmetrical, moving all extremities well  Psych: normal mood, appropriate affect    Tele:  1st deg AVB, PACs, no significant tachyarrhyhtmias seen    Results Review (reviewed the patient's recent labs in the electronic medical record):     Lab Results   Component Value Date    WBC 6.65 04/19/2024    HGB 15.1 04/19/2024    HCT 45.0 04/19/2024    MCV 83.5 04/19/2024     04/19/2024     Lab Results   Component Value Date    GLUCOSE 90 04/19/2024    CALCIUM 8.6 04/19/2024     04/19/2024    K 3.8 04/19/2024    CO2 25.0 04/19/2024     04/19/2024    BUN 12 04/19/2024    CREATININE 0.70 (L) 04/19/2024    EGFRIFNONA 71 09/07/2021    BCR 17.1 04/19/2024    ANIONGAP 8.0 04/19/2024     Lab Results   Component Value Date    HGBA1C 5.5 08/30/2019      Lab Results   Component Value Date    CHOL 185 04/16/2024    TRIG 107 04/16/2024    HDL 58 04/16/2024     (H) 04/16/2024        Assessment     [facility]  1st deg AVB   - no significant issues seen on telemetry   - does have some palpitations at home but fairly rare   - expectant management for now   - plan  for outpatient follow-up in about 2 months    JESSICA Mcdonnell MD  4/19/2024  08:30 EDT

## 2024-04-19 NOTE — DISCHARGE SUMMARY
"PATIENT:  LOLITA LANDAVERDE  YOB: 1955  VISIT ID:  [id number]  PRIMARY CARE:  Yesy Kearney APRN  ADMITTING PHYSICIAN:  Ciro Ballesteros MD    DATE OF ADMISSION:  4/18/2024  DATE OF DISCHARGE:  04/19/24      ADMITTING DIAGNOSIS:  Asymptomatic, high-grade left carotid stenosis    DISCHARGE DIAGNOSIS:  Asymptomatic, high-grade left carotid stenosis  Paroxysmal SVT      PROCEDURES:  1.  Diagnostic carotid/cerebral angiography  2.  Left carotid angioplasty/stent placement, with distal embolic protection device (EPD)  3.  Carotid duplex    BRIEF HOSPITAL COURSE:  Mr. Landaverde is a 68 y.o. male with history of head neck cancer, status post left modified radical neck dissection, along with radiation therapy over 10 years ago.  He underwent recent noninvasive imaging studies demonstrating a high-grade left common carotid stenosis.  He denies any prior stroke or TIA-like symptoms.  Given his history of surgery and radiation therapy, he was deemed a \"high surgical risk\" for endarterectomy, and was given options of conservative management versus angioplasty/stent placement.  After deliberation, Mr. Landaverde elected to pursue intervention, and underwent angioplasty/stent placement on 4/18/2024, restoring a near normal caliber lumen to the left carotid vasculature and without appreciable residual stenosis.    From a neurologic standpoint, Mr. Landaverde made an uneventful recovery in the neuro ICU, and was discharged home at his neurologic baseline on 4/19/2024.  At the time of discharge, he was ambulating unassisted and tolerating p.o. intake.    Mr. Landaverde does have a history of arrhythmia and prior \"ablation\" over a decade ago at [facility].  In the pre-- operative area, he had an EKG demonstrating a \"1st degree AV block and RSR pattern in V1, PACs\".  Mr. Landaverde states that he can feel his heart racing at times, and cardiology was consulted.  He is going to be discharged home and follow-up as an outpatient " in 2 months time with Cardiology.    Mr. Riley's LDL is suboptimal, and he wishes to follow-up with his PCP before initiating any sort of statin regimen.  His blood pressure was also somewhat elevated during this hospitalization, and he has similarly wishes to follow-up with his PCP for further recommendations/management, and this is certainly reasonable.  He will follow-up with his PCP in the next week or so.    Discharge instructions to include medications, activities, signs/symptoms of stroke, and follow-up care were provided to Mr. Riley and family, and they expressed an understanding.      DISCHARGE MEDICATIONS:     Discharge Medications        Continue These Medications        Instructions Start Date   aspirin 81 MG chewable tablet   81 mg, Oral, Daily      clopidogrel 75 MG tablet  Commonly known as: Plavix   75 mg, Oral, Daily, Start taking 5 days prior to the procedure      levothyroxine 112 MCG tablet  Commonly known as: SYNTHROID, LEVOTHROID   112 mcg, Oral, Daily      NIFEdipine XL 30 MG 24 hr tablet  Commonly known as: PROCARDIA XL   30 mg, Oral, Daily      omeprazole 20 MG capsule  Commonly known as: priLOSEC   20 mg, Oral, Daily      sildenafil 20 MG tablet  Commonly known as: REVATIO   2-5 PO as needed for sexual activity                 ACTIVITY:  No strenuous activity or heavy lifting for 5-7 days.    DIET:  Cardiac diet    FOLLOW UP:       Follow-up Information       Given, Ciro VALENCIA MD Follow up in 1 month(s).    Specialty: Neurosurgery  Contact information:  [address]  [address]  [place] [id number] [phone]               Yesy Kearney, APRN .    Specialty: Family Medicine  Contact information:  [address]  [address]  [place] [id number] [phone]

## 2024-04-22 ENCOUNTER — TRANSITIONAL CARE MANAGEMENT TELEPHONE ENCOUNTER (OUTPATIENT)
Dept: CALL CENTER | Facility: HOSPITAL | Age: 69
End: 2024-04-22
Payer: COMMERCIAL

## 2024-04-22 ENCOUNTER — OFFICE VISIT (OUTPATIENT)
Dept: INTERNAL MEDICINE | Facility: CLINIC | Age: 69
End: 2024-04-22
Payer: COMMERCIAL

## 2024-04-22 ENCOUNTER — LAB (OUTPATIENT)
Dept: INTERNAL MEDICINE | Facility: CLINIC | Age: 69
End: 2024-04-22
Payer: COMMERCIAL

## 2024-04-22 VITALS
HEART RATE: 63 BPM | TEMPERATURE: 99.5 F | SYSTOLIC BLOOD PRESSURE: 132 MMHG | BODY MASS INDEX: 20.79 KG/M2 | RESPIRATION RATE: 16 BRPM | DIASTOLIC BLOOD PRESSURE: 88 MMHG | WEIGHT: 162 LBS | HEIGHT: 74 IN | OXYGEN SATURATION: 98 %

## 2024-04-22 DIAGNOSIS — E03.9 ACQUIRED HYPOTHYROIDISM: ICD-10-CM

## 2024-04-22 DIAGNOSIS — R00.2 PALPITATIONS: ICD-10-CM

## 2024-04-22 DIAGNOSIS — E78.2 MIXED HYPERLIPIDEMIA: ICD-10-CM

## 2024-04-22 DIAGNOSIS — I44.0 1ST DEGREE AV BLOCK: ICD-10-CM

## 2024-04-22 DIAGNOSIS — K21.9 GASTROESOPHAGEAL REFLUX DISEASE, UNSPECIFIED WHETHER ESOPHAGITIS PRESENT: ICD-10-CM

## 2024-04-22 DIAGNOSIS — R00.2 PALPITATIONS: Primary | ICD-10-CM

## 2024-04-22 LAB
BASOPHILS # BLD AUTO: 0.08 10*3/MM3 (ref 0–0.2)
BASOPHILS NFR BLD AUTO: 1.1 % (ref 0–1.5)
DEPRECATED RDW RBC AUTO: 39.1 FL (ref 37–54)
EOSINOPHIL # BLD AUTO: 0.16 10*3/MM3 (ref 0–0.4)
EOSINOPHIL NFR BLD AUTO: 2.2 % (ref 0.3–6.2)
ERYTHROCYTE [DISTWIDTH] IN BLOOD BY AUTOMATED COUNT: 13.1 % (ref 12.3–15.4)
HCT VFR BLD AUTO: 44.2 % (ref 37.5–51)
HGB BLD-MCNC: 14.8 G/DL (ref 13–17.7)
IMM GRANULOCYTES # BLD AUTO: 0.05 10*3/MM3 (ref 0–0.05)
IMM GRANULOCYTES NFR BLD AUTO: 0.7 % (ref 0–0.5)
LYMPHOCYTES # BLD AUTO: 1.52 10*3/MM3 (ref 0.7–3.1)
LYMPHOCYTES NFR BLD AUTO: 21.2 % (ref 19.6–45.3)
MCH RBC QN AUTO: 27.8 PG (ref 26.6–33)
MCHC RBC AUTO-ENTMCNC: 33.5 G/DL (ref 31.5–35.7)
MCV RBC AUTO: 82.9 FL (ref 79–97)
MONOCYTES # BLD AUTO: 1.02 10*3/MM3 (ref 0.1–0.9)
MONOCYTES NFR BLD AUTO: 14.2 % (ref 5–12)
NEUTROPHILS NFR BLD AUTO: 4.33 10*3/MM3 (ref 1.7–7)
NEUTROPHILS NFR BLD AUTO: 60.6 % (ref 42.7–76)
NRBC BLD AUTO-RTO: 0 /100 WBC (ref 0–0.2)
PLATELET # BLD AUTO: 219 10*3/MM3 (ref 140–450)
PMV BLD AUTO: 10.4 FL (ref 6–12)
RBC # BLD AUTO: 5.33 10*6/MM3 (ref 4.14–5.8)
WBC NRBC COR # BLD AUTO: 7.16 10*3/MM3 (ref 3.4–10.8)

## 2024-04-22 PROCEDURE — 83735 ASSAY OF MAGNESIUM: CPT | Performed by: NURSE PRACTITIONER

## 2024-04-22 PROCEDURE — 80053 COMPREHEN METABOLIC PANEL: CPT | Performed by: NURSE PRACTITIONER

## 2024-04-22 PROCEDURE — 36415 COLL VENOUS BLD VENIPUNCTURE: CPT | Performed by: NURSE PRACTITIONER

## 2024-04-22 PROCEDURE — 85025 COMPLETE CBC W/AUTO DIFF WBC: CPT | Performed by: NURSE PRACTITIONER

## 2024-04-22 PROCEDURE — 84100 ASSAY OF PHOSPHORUS: CPT | Performed by: NURSE PRACTITIONER

## 2024-04-22 RX ORDER — ATORVASTATIN CALCIUM 20 MG/1
20 TABLET, FILM COATED ORAL DAILY
Qty: 90 TABLET | Refills: 1 | Status: SHIPPED | OUTPATIENT
Start: 2024-04-22

## 2024-04-22 RX ORDER — PANTOPRAZOLE SODIUM 40 MG/1
40 TABLET, DELAYED RELEASE ORAL DAILY
Qty: 90 TABLET | Refills: 1 | Status: SHIPPED | OUTPATIENT
Start: 2024-04-22

## 2024-04-22 RX ORDER — LEVOTHYROXINE SODIUM 100 MCG
100 TABLET ORAL DAILY
Qty: 30 TABLET | Refills: 2 | Status: SHIPPED | OUTPATIENT
Start: 2024-04-22

## 2024-04-22 NOTE — OUTREACH NOTE
Call Center TCM Note      Flowsheet Row Responses   [facility] patient discharged from? [place]   Does the patient have one of the following disease processes/diagnoses(primary or secondary)? General Surgery   TCM attempt successful? Yes   Call start time 1010   Call end time 1020   Discharge diagnosis Carotid stenosis, asymptomatic, left   Meds reviewed with patient/caregiver? Yes   Is the patient having any side effects they believe may be caused by any medication additions or changes? No   Does the patient have all medications related to this admission filled (includes all antibiotics, pain medications, etc.) N/A   Is the patient taking all medications as directed (includes completed medication regime)? Yes   Comments Hospital d/c f/u appt on 4/22/24 @3:15pm   Does the patient have an appointment with their PCP within 7-14 days of discharge? Yes   Has home health visited the patient within 72 hours of discharge? N/A   Psychosocial issues? No   Did the patient receive a copy of their discharge instructions? Yes   Nursing interventions Reviewed instructions with patient   What is the patient's perception of their health status since discharge? Improving  [Pt reports having a few episodes of orthostatic hypotension on Sat, however hasn't had any since, contacted his PCP at that time. Has also had some episodes of heart skipping a beat while checking his HR, however at time of call HR seemed normal]   Nursing interventions Nurse provided patient education   Is the patient /caregiver able to teach back basic post-op care? Lifting as instructed by MD in discharge instructions   Is the patient/caregiver able to teach back steps to recovery at home? Set small, achievable goals for return to baseline health   Is the patient/caregiver able to teach back the hierarchy of who to call/visit for symptoms/problems? PCP, Specialist, Home health nurse, Urgent Care, ED, 911 Yes   TCM call completed? Yes   Call end time  1020   Would this patient benefit from a Referral to [facility] Social Work? No   Is the patient interested in additional calls from an ambulatory ? No            Yesy Eason RN    4/22/2024, 10:24 EDT

## 2024-04-22 NOTE — PROGRESS NOTES
"Transitional Care Follow Up Visit  Subjective     Boone Riley is a 68 y.o. male who presents for a transitional care management visit.    Within 48 business hours after discharge our office contacted him via telephone to coordinate his care and needs.      I reviewed and discussed the details of that call along with the discharge summary, hospital problems, inpatient lab results, inpatient diagnostic studies, and consultation reports with Boone.     Current outpatient and discharge medications have been reconciled for the patient.  Reviewed by: KIERSTEN Thorpe          4/19/2024     4:05 PM   Date of TCM Phone Call   [facility]   Date of Admission 4/18/2024   Date of Discharge 4/19/2024   Discharge Disposition Home or Self Care     Risk for Readmission (LACE) Score: 7 (4/19/2024  6:00 AM)      History of Present Illness   Course During Hospital Stay:      Mr. Riley is a 68 y.o. male with history of head neck cancer, status post left modified radical neck dissection, along with radiation therapy over 10 years ago.  He underwent recent noninvasive imaging studies demonstrating a high-grade left common carotid stenosis.  He denies any prior stroke or TIA-like symptoms.  Given his history of surgery and radiation therapy, he was deemed a \"high surgical risk\" for endarterectomy, and was given options of conservative management versus angioplasty/stent placement.  After deliberation, Mr. Riley elected to pursue intervention, and underwent angioplasty/stent placement on 4/18/2024, restoring a near normal caliber lumen to the left carotid vasculature and without appreciable residual stenosis.     From a neurologic standpoint, Mr. Riley made an uneventful recovery in the neuro ICU, and was discharged home at his neurologic baseline on 4/19/2024.  At the time of discharge, he was ambulating unassisted and tolerating p.o. intake.     Mr. Riley does have a history of arrhythmia " "and prior \"ablation\" over a decade ago at [facility].  In the pre-- operative area, he had an EKG demonstrating a \"1st degree AV block and RSR pattern in V1, PACs\".  Mr. Riley states that he can feel his heart racing at times, and cardiology was consulted.  He is going to be discharged home and follow-up as an outpatient in 2 months time with Cardiology.     Mr. Riley's LDL is suboptimal, and he wishes to follow-up with his PCP before initiating any sort of statin regimen.  His blood pressure was also somewhat elevated during this hospitalization, and he has similarly wishes to follow-up with his PCP for further recommendations/management, and this is certainly reasonable.  He will follow-up with his PCP in the next week or so.     Discharge instructions to include medications, activities, signs/symptoms of stroke, and follow-up care were provided to Mr. Riley and family, and they expressed an understanding.     States that since d/c his BP has been labile.  It has been great while laying and sitting, but has dropped some when he goes from sitting to standing.  He has also had recurrent palpitations.  BP seems to have normalized and he is getting #s like we got here in the office today, but he is still having the palps.      Needs PPI changed due to interaction with Omeprazole and Plavix.    Did have recent routine labs on 4/16/24.  TSH was 0.91 and T4 was 1.97     The following portions of the patient's history were reviewed and updated as appropriate: allergies, current medications, past family history, past medical history, past social history, past surgical history, and problem list.    Review of Systems   Constitutional:  Negative for chills, fatigue and fever.   Respiratory:  Negative for cough, chest tightness and shortness of breath.    Cardiovascular:  Negative for chest pain.   Gastrointestinal:  Negative for abdominal pain, diarrhea, nausea and vomiting.   Endocrine: Negative for cold intolerance and " heat intolerance.   Musculoskeletal:  Negative for arthralgias.   Neurological:  Negative for dizziness.       Objective   Physical Exam  Constitutional:       Appearance: He is well-developed.   HENT:      Head: Normocephalic and atraumatic.   Eyes:      Conjunctiva/sclera: Conjunctivae normal.      Pupils: Pupils are equal, round, and reactive to light.   Cardiovascular:      Rate and Rhythm: Normal rate and regular rhythm.      Heart sounds: Normal heart sounds.   Pulmonary:      Effort: Pulmonary effort is normal.      Breath sounds: Normal breath sounds.   Abdominal:      General: Bowel sounds are normal.      Palpations: Abdomen is soft.   Musculoskeletal:         General: Normal range of motion.      Cervical back: Normal range of motion.   Skin:     General: Skin is warm and dry.   Neurological:      Mental Status: He is alert and oriented to person, place, and time.      Deep Tendon Reflexes: Reflexes are normal and symmetric.   Psychiatric:         Behavior: Behavior normal.         Thought Content: Thought content normal.         Judgment: Judgment normal.         Assessment & Plan   Diagnoses and all orders for this visit:    1. Palpitations (Primary)  -     Holter Monitor - 72 Hour Up To 15 Days; Future  -     CBC & Differential; Future  -     Comprehensive Metabolic Panel; Future  -     Magnesium; Future  -     Phosphorus; Future  -     Synthroid 100 MCG tablet; Take 1 tablet by mouth Daily.  Dispense: 30 tablet; Refill: 2    2. 1st degree AV block  -     Holter Monitor - 72 Hour Up To 15 Days; Future  -     CBC & Differential; Future  -     Comprehensive Metabolic Panel; Future  -     Magnesium; Future  -     Phosphorus; Future  -     Synthroid 100 MCG tablet; Take 1 tablet by mouth Daily.  Dispense: 30 tablet; Refill: 2    3. Gastroesophageal reflux disease, unspecified whether esophagitis present  -     pantoprazole (PROTONIX) 40 MG EC tablet; Take 1 tablet by mouth Daily.  Dispense: 90 tablet;  Refill: 1  -     CBC & Differential; Future  -     Comprehensive Metabolic Panel; Future  -     Magnesium; Future  -     Phosphorus; Future  -     Synthroid 100 MCG tablet; Take 1 tablet by mouth Daily.  Dispense: 30 tablet; Refill: 2    4. Acquired hypothyroidism  -     Synthroid 100 MCG tablet; Take 1 tablet by mouth Daily.  Dispense: 30 tablet; Refill: 2    5. Mixed hyperlipidemia  -     atorvastatin (Lipitor) 20 MG tablet; Take 1 tablet by mouth Daily.  Dispense: 90 tablet; Refill: 1    Will ck a 14 day holter  Labs reviewed, additional labs sent today  Will decrease thyroid med and change to brand only synthroid to see if this helps palps  Keep appt as scheduled with Cardiology  D/C omeprazole and change to protonix  Add statin  Return in about 8 weeks (around 6/17/2024).

## 2024-04-23 LAB
ALBUMIN SERPL-MCNC: 4.5 G/DL (ref 3.5–5.2)
ALBUMIN/GLOB SERPL: 2 G/DL
ALP SERPL-CCNC: 65 U/L (ref 39–117)
ALT SERPL W P-5'-P-CCNC: 13 U/L (ref 1–41)
ANION GAP SERPL CALCULATED.3IONS-SCNC: 11 MMOL/L (ref 5–15)
AST SERPL-CCNC: 19 U/L (ref 1–40)
BILIRUB SERPL-MCNC: 0.4 MG/DL (ref 0–1.2)
BUN SERPL-MCNC: 18 MG/DL (ref 8–23)
BUN/CREAT SERPL: 19.8 (ref 7–25)
CALCIUM SPEC-SCNC: 9.2 MG/DL (ref 8.6–10.5)
CHLORIDE SERPL-SCNC: 100 MMOL/L (ref 98–107)
CO2 SERPL-SCNC: 28 MMOL/L (ref 22–29)
CREAT SERPL-MCNC: 0.91 MG/DL (ref 0.76–1.27)
EGFRCR SERPLBLD CKD-EPI 2021: 93 ML/MIN/1.73
GLOBULIN UR ELPH-MCNC: 2.3 GM/DL
GLUCOSE SERPL-MCNC: 86 MG/DL (ref 65–99)
MAGNESIUM SERPL-MCNC: 2.1 MG/DL (ref 1.6–2.4)
PHOSPHATE SERPL-MCNC: 3.9 MG/DL (ref 2.5–4.5)
POTASSIUM SERPL-SCNC: 4.4 MMOL/L (ref 3.5–5.2)
PROT SERPL-MCNC: 6.8 G/DL (ref 6–8.5)
SODIUM SERPL-SCNC: 139 MMOL/L (ref 136–145)

## 2024-04-30 ENCOUNTER — READMISSION MANAGEMENT (OUTPATIENT)
Dept: CALL CENTER | Facility: HOSPITAL | Age: 69
End: 2024-04-30
Payer: COMMERCIAL

## 2024-04-30 NOTE — OUTREACH NOTE
General Surgery Week 2 Survey      Flowsheet Row Responses   [facility] patient discharged from? [place]   Does the patient have one of the following disease processes/diagnoses(primary or secondary)? General Surgery   Week 2 attempt successful? Yes   Call start time 1048   Call end time 1050   Discharge diagnosis Carotid stenosis, asymptomatic, left   Meds reviewed with patient/caregiver? Yes   Is the patient taking all medications as directed (includes completed medication regime)? Yes   Does the patient have a follow up appointment scheduled with their surgeon? Yes   Has the patient kept scheduled appointments due by today? Yes   Has home health visited the patient within 72 hours of discharge? N/A   Psychosocial issues? No   Did the patient receive a copy of their discharge instructions? Yes   Nursing interventions Reviewed instructions with patient   What is the patient's perception of their health status since discharge? Improving   Is the patient /caregiver able to teach back basic post-op care? Lifting as instructed by MD in discharge instructions   Is the patient/caregiver able to teach back signs and symptoms of incisional infection? Fever, Pus or odor from incision, Incisional warmth, Increased drainage or bleeding, Increased redness, swelling or pain at the incisonal site   Is the patient/caregiver able to teach back steps to recovery at home? Eat a well-balance diet   Is the patient/caregiver able to teach back the hierarchy of who to call/visit for symptoms/problems? PCP, Specialist, Home health nurse, Urgent Care, ED, 911 Yes   Week 2 call completed? Yes   Graduated Yes   Graduated/Revoked comments Pt reports he is doing well. No issues.   Call end time 1050            GABE SYED - Registered Nurse

## 2024-05-06 ENCOUNTER — TELEPHONE (OUTPATIENT)
Dept: CARDIOLOGY | Facility: CLINIC | Age: 69
End: 2024-05-06
Payer: COMMERCIAL

## 2024-05-06 NOTE — TELEPHONE ENCOUNTER
"    Name: Frederick Rileyleonel PEREZ \"FREDERICK\"    Relationship: Self    Best Callback Number: [phone]    Incoming call to the Hub, requesting to  Cancel their HFU appointment on 6.19.24.     Per Hub workflow, further review is needed before the task can be completed.    Result of Call: Please cancel this appointment      "

## 2024-05-07 NOTE — TELEPHONE ENCOUNTER
SW PT, CANCELLED APPT, PT WANTS TO SWITCH TO DR. WALKER. I ASKED PERMISSION FROM KIERSTEN KNIGHT, WAITING FOR RESPONSE. LET PT KNOW THAT WHEN I GET AN ANSWER I WILL LET HIM KNOW. PT STATED IF THE ANSWER IS NO THAT HE WOULD SEEK CARE OUTSIDE OF [facility].

## 2024-05-20 ENCOUNTER — OFFICE VISIT (OUTPATIENT)
Dept: NEUROSURGERY | Facility: CLINIC | Age: 69
End: 2024-05-20
Payer: COMMERCIAL

## 2024-05-20 VITALS
BODY MASS INDEX: 20.66 KG/M2 | SYSTOLIC BLOOD PRESSURE: 110 MMHG | HEIGHT: 74 IN | WEIGHT: 161 LBS | DIASTOLIC BLOOD PRESSURE: 70 MMHG | TEMPERATURE: 97.8 F | HEART RATE: 82 BPM | OXYGEN SATURATION: 98 %

## 2024-05-20 DIAGNOSIS — I65.22 CAROTID STENOSIS, ASYMPTOMATIC, LEFT: Primary | ICD-10-CM

## 2024-05-20 PROCEDURE — 99024 POSTOP FOLLOW-UP VISIT: CPT | Performed by: RADIOLOGY

## 2024-05-20 NOTE — PROGRESS NOTES
"NAME: LOLITA LANDAVERDE   DOS: 2024  : 1955  PCP: Yesy Kearney APRN    Chief Complaint:    Chief Complaint   Patient presents with    Follow-up     Carotid stent placement       History of Present Illness:  68 y.o. male who is well-known to the neurointerventional service, having undergone prior angioplasty/stent placement for asymptomatic, high-grade left common carotid stenosis on 2024.  Mr. Landaverde has a history of head neck cancer, status post left modified radical neck dissection, along with radiation therapy over 10 years ago, and thus was deemed a \"high surgical risk\" for endarterectomy.    Mr. Landaverde has done well following his hospitalization, denying any stroke or TIA-only symptoms.  He is currently on a dual antiplatelet regimen, and tolerating this quite well.  He presents today for routine follow-up.       Past Medical History:  Past Medical History:   Diagnosis Date    Allergic     Seasonal    Carotid artery occlusion 3/28/2024    CTA report, same date    Cervical disc disorder Seceral years    Noted in CTA report 3/28/2024    Diverticulosis of small intestine without hemorrhage     Erectile dysfunction 2018    Esophageal reflux     Essential and other specified forms of tremor     General medical exam     HL (hearing loss) 2018    Began with chemo in , not severe    Hypertension     Hypertrophy (benign) of prostate     Hypothyroidism     Paroxysmal supraventricular tachycardia     Peripheral neuropathy Seversl years    Left hand, thumb snd index finger numb    Screening PSA (prostate specific antigen)     per Urology    Sleep disorder     Spinal stenosis of cervical region     Throat cancer     base of tongue    Tremor 2010    Diagnosed essential tremor       Past Surgical History:  Past Surgical History:   Procedure Laterality Date    CARDIAC ABLATION   or 2009?    CAROTID STENT Left 2024    Procedure: Carotid Stent;  Surgeon: Ciro Ballesteros MD;  " Location: [facility] INVASIVE LOCATION;  Service: Interventional Radiology;  Laterality: Left;    COLONOSCOPY  2008    due 2018    CORONARY STENT PLACEMENT  4/18/2024    Left corotid, Dr Ballesteros, [facility]    ENDOMETRIAL ABLATION      ENDOSCOPY  01/2015    Dr. Gallo- [facility]    NECK DISSECTION      Dr. CAMACHO    PROSTATE SURGERY  07/20/2022    Urolift       Review of Systems:        Review of Systems   Neurological:  Positive for tremors.   Hematological:  Bruises/bleeds easily.        Medications    Current Outpatient Medications:     aspirin 81 MG chewable tablet, Chew 1 tablet Daily., Disp: 30 tablet, Rfl: 5    atorvastatin (Lipitor) 20 MG tablet, Take 1 tablet by mouth Daily., Disp: 90 tablet, Rfl: 1    NIFEdipine XL (PROCARDIA XL) 30 MG 24 hr tablet, TAKE 1 TABLET BY MOUTH EVERY DAY, Disp: 90 tablet, Rfl: 0    pantoprazole (PROTONIX) 40 MG EC tablet, Take 1 tablet by mouth Daily., Disp: 90 tablet, Rfl: 1    sildenafil (REVATIO) 20 MG tablet, 2-5 PO as needed for sexual activity, Disp: 50 tablet, Rfl: 2    Synthroid 100 MCG tablet, Take 1 tablet by mouth Daily., Disp: 30 tablet, Rfl: 2    Allergies:  No Known Allergies    Social Hx:  Social History     Tobacco Use    Smoking status: Never     Passive exposure: Never    Smokeless tobacco: Never   Vaping Use    Vaping status: Never Used   Substance Use Topics    Alcohol use: No    Drug use: No       Family Hx:  Family History   Problem Relation Age of Onset    Breast cancer Mother     Cancer Mother         Breast Cancer    Early death Mother         Due to untreated breast cancer, age 53    Lung cancer Father     Alcohol abuse Father     Cancer Father         Lung cancer, lifetime smoker    Diabetes Brother         Type 2 onset approx age 55       Review of Imaging:  No new imaging.    Physical Examination:  Vitals:    05/20/24 1413   BP: 110/70   Pulse: 82   Temp: 97.8 °F (36.6 °C)   SpO2: 98%        General Appearance:   Well developed, well nourished, well groomed,  "alert, and cooperative.  Cardiovascular: Regular rate and rhythm. No carotid bruits      Neurological examination:  Alert and oriented x 3.  Nonfocal neurologic exam.  Speech is clear.  No facial droop or pronator drift.  I do not appreciate any gross visual field deficits.  Rigid neck is noted from prior surgery/radiation.  Strength is symmetric in the upper and lower extremities.  He ambulates unassisted.    Diagnoses/Plan:    Mr. Riley is a 68 y.o. male status post angioplasty/stent placement for asymptomatic, high-grade left common carotid artery stenosis on 4/18/2024.  Mr. Riley does have a history of head neck cancer, status post surgery and radiation therapy, and thus was a \"high surgical risk\" for endarterectomy.  He has done well following his hospitalization, denying any new stroke or TIA-like symptoms.  At this point, I think it is reasonable to discontinue his Plavix, albeit he should remain on aspirin/statin regimen and indefinitely.  I plan on seeing him back in 6 months time with carotid duplex, to ensure stability and exclude any recurrent/progression of disease that might necessitate further treatment/intervention.  During the interim, he will contact our office in/or call 911 if he experiences any stroke or TIA-like symptoms.    Copied text and portions of the note have been reviewed and are accurate as of 5/20/24.                  "

## 2024-05-23 ENCOUNTER — TELEPHONE (OUTPATIENT)
Dept: INTERNAL MEDICINE | Facility: CLINIC | Age: 69
End: 2024-05-23
Payer: COMMERCIAL

## 2024-05-23 NOTE — TELEPHONE ENCOUNTER
Spoke with patient, informed of results. Verbalized understanding and had no further questions.      Patient states his BP has been normal. 130/75 or 80

## 2024-06-27 ENCOUNTER — OFFICE VISIT (OUTPATIENT)
Dept: INTERNAL MEDICINE | Facility: CLINIC | Age: 69
End: 2024-06-27
Payer: COMMERCIAL

## 2024-06-27 VITALS
OXYGEN SATURATION: 97 % | BODY MASS INDEX: 20.89 KG/M2 | HEIGHT: 74 IN | WEIGHT: 162.8 LBS | HEART RATE: 77 BPM | SYSTOLIC BLOOD PRESSURE: 130 MMHG | DIASTOLIC BLOOD PRESSURE: 78 MMHG | RESPIRATION RATE: 16 BRPM | TEMPERATURE: 99.2 F

## 2024-06-27 DIAGNOSIS — E03.9 ACQUIRED HYPOTHYROIDISM: ICD-10-CM

## 2024-06-27 DIAGNOSIS — R91.8 GROUND GLASS OPACITY PRESENT ON IMAGING OF LUNG: Primary | ICD-10-CM

## 2024-06-27 DIAGNOSIS — I65.22 CAROTID STENOSIS, ASYMPTOMATIC, LEFT: ICD-10-CM

## 2024-06-27 DIAGNOSIS — Z85.819 HISTORY OF THROAT CANCER: ICD-10-CM

## 2024-06-27 DIAGNOSIS — E78.2 MIXED HYPERLIPIDEMIA: ICD-10-CM

## 2024-06-27 DIAGNOSIS — I10 BENIGN ESSENTIAL HYPERTENSION: ICD-10-CM

## 2024-06-27 DIAGNOSIS — I47.10 PAROXYSMAL SUPRAVENTRICULAR TACHYCARDIA: ICD-10-CM

## 2024-06-27 PROCEDURE — 99214 OFFICE O/P EST MOD 30 MIN: CPT | Performed by: NURSE PRACTITIONER

## 2024-06-27 NOTE — PROGRESS NOTES
Subjective   Boone Riley is a 68 y.o. male    Chief Complaint   Patient presents with   • Follow-up     2 month follow up. Patient states since last visit everything has been going good, one of the medications made him feel a little hazy at first but now he feels normal.      History of Present Illness     Frederick works at [email or web address]. They were running tests at work and did a carotid duplex on him that led to a CTA of the neck which revealed 80 % stenosis of the left carotid. He was seen Dr. Ballesteros, underwent an angiogram, that led to an angioplasty/stent placement for asymptomatic, high-grade left common carotid stenosis on 4/18/2024.  Reports that he is feeling well since surgery.  No complications.  Had recent follow-up and got a great report.    Had PET in relation to this as well. Groundglass was noted in lung. He will have repeat scan in 3 months. This is due after 7/9/2024    Palps - during his preop work up, he was noted to [id number] degree AV block and RSR pattern in V1, PACs.  At postop follow-up, he did complain of palpitations.  Has worn a Holter since.  Did have some runs of SVT.  Has been referred to cardiology and is scheduled on 7/8/2024.  Discussed beta-blocker but he wishes to hold off until he sees cardiology.    GERD-PPI was changed from omeprazole to pantoprazole at postop visit due to interaction with omeprazole and Plavix.  At his follow-up with Dr. Ballesteros, Plavix was stopped.  He will be on aspirin indefinitely.  States that he is tolerating the pantoprazole well.  No breakthroughs of GERD.    HL-was started on Lipitor 20 mg at last visit.  He is tolerating without side effect.    HTN-chronic and stable on nifedipine XL 30 mg 1 p.o. daily.  Blood pressure within acceptable limits today.    Hypothyroid-was changed from generic to brand Synthroid at last visit to see if this would make a difference in his palpitations.  Unsure if the change to brand Synthroid has had much effect, but his palpitations  are improved.    BPH - Seeing Urology ([facility]  Takes PRN meds for ED.  Was given 20mg Sildenafil that he takes PRN/rarely. He did have a Urolift 7/20/2022. He has been able to d/c the flomax       H/O throat (base of tongue) cancer.  Dx'd in 2008.  Followed by Dr. CAMACHO.  All recent reports have been good. Seeing him on a yearly basis now.      Vit D and B12 def - chronic/levels are low.  Not currently taking any supplements    Flu shot - 10/2023 @ work  COVID - 12/23/20, 1/22/21, declines booster  Tdap - 2014  PSA - ordered  colon - 10/1/2018  Hep A - 2/2019, 8/2019    The following portions of the patient's history were reviewed and updated as appropriate: allergies, current medications, past family history, past medical history, past social history, past surgical history, and problem list.    Current Outpatient Medications:   •  aspirin 81 MG chewable tablet, Chew 1 tablet Daily., Disp: 30 tablet, Rfl: 5  •  atorvastatin (Lipitor) 20 MG tablet, Take 1 tablet by mouth Daily., Disp: 90 tablet, Rfl: 1  •  NIFEdipine XL (PROCARDIA XL) 30 MG 24 hr tablet, TAKE 1 TABLET BY MOUTH EVERY DAY, Disp: 90 tablet, Rfl: 0  •  pantoprazole (PROTONIX) 40 MG EC tablet, Take 1 tablet by mouth Daily., Disp: 90 tablet, Rfl: 1  •  sildenafil (REVATIO) 20 MG tablet, 2-5 PO as needed for sexual activity, Disp: 50 tablet, Rfl: 2  •  Synthroid 100 MCG tablet, Take 1 tablet by mouth Daily., Disp: 30 tablet, Rfl: 2     Review of Systems   Constitutional:  Negative for chills, fatigue and fever.   Respiratory:  Negative for cough, chest tightness and shortness of breath.    Cardiovascular:  Negative for chest pain.   Gastrointestinal:  Negative for abdominal pain, diarrhea, nausea and vomiting.   Endocrine: Negative for cold intolerance and heat intolerance.   Musculoskeletal:  Negative for arthralgias.   Neurological:  Negative for dizziness.       Objective   Physical Exam  Constitutional:       Appearance: He is  "well-developed.   HENT:      Head: Normocephalic and atraumatic.   Eyes:      Conjunctiva/sclera: Conjunctivae normal.      Pupils: Pupils are equal, round, and reactive to light.   Cardiovascular:      Rate and Rhythm: Normal rate and regular rhythm.      Heart sounds: Normal heart sounds.   Pulmonary:      Effort: Pulmonary effort is normal.      Breath sounds: Normal breath sounds.   Abdominal:      General: Bowel sounds are normal.      Palpations: Abdomen is soft.   Musculoskeletal:         General: Normal range of motion.      Cervical back: Normal range of motion.   Skin:     General: Skin is warm and dry.   Neurological:      Mental Status: He is alert and oriented to person, place, and time.      Deep Tendon Reflexes: Reflexes are normal and symmetric.   Psychiatric:         Behavior: Behavior normal.         Thought Content: Thought content normal.         Judgment: Judgment normal.     Vitals:    06/27/24 1426   BP: 130/78   Pulse: 77   Resp: 16   Temp: 99.2 °F (37.3 °C)   TempSrc: Temporal   SpO2: 97%   Weight: 73.8 kg (162 lb 12.8 oz)   Height: 188 cm (74.02\")         Assessment & Plan   Diagnoses and all orders for this visit:    1. Ground glass opacity present on imaging of lung (Primary)  -     CT Chest With Contrast; Future    2. Benign essential hypertension    3. Carotid stenosis, asymptomatic, left    4. Paroxysmal supraventricular tachycardia    5. Acquired hypothyroidism    6. History of throat cancer    7. Mixed hyperlipidemia  -     Comprehensive Metabolic Panel; Future  -     Lipid Panel; Future      Follow-up CT of the chest ordered  Will schedule at [facility]  He will have a CMP and FLP drawn before next follow-up.  No other medication changes made today  Return in about 6 months (around 12/27/2024) for f/u.           "

## 2024-07-05 ENCOUNTER — LAB (OUTPATIENT)
Dept: INTERNAL MEDICINE | Facility: CLINIC | Age: 69
End: 2024-07-05
Payer: COMMERCIAL

## 2024-07-05 DIAGNOSIS — E78.2 MIXED HYPERLIPIDEMIA: ICD-10-CM

## 2024-07-05 LAB
ALBUMIN SERPL-MCNC: 4.3 G/DL (ref 3.5–5.2)
ALBUMIN/GLOB SERPL: 1.9 G/DL
ALP SERPL-CCNC: 63 U/L (ref 39–117)
ALT SERPL W P-5'-P-CCNC: 14 U/L (ref 1–41)
ANION GAP SERPL CALCULATED.3IONS-SCNC: 10 MMOL/L (ref 5–15)
AST SERPL-CCNC: 20 U/L (ref 1–40)
BILIRUB SERPL-MCNC: 0.9 MG/DL (ref 0–1.2)
BUN SERPL-MCNC: 20 MG/DL (ref 8–23)
BUN/CREAT SERPL: 17.2 (ref 7–25)
CALCIUM SPEC-SCNC: 9.2 MG/DL (ref 8.6–10.5)
CHLORIDE SERPL-SCNC: 102 MMOL/L (ref 98–107)
CHOLEST SERPL-MCNC: 113 MG/DL (ref 0–200)
CO2 SERPL-SCNC: 27 MMOL/L (ref 22–29)
CREAT SERPL-MCNC: 1.16 MG/DL (ref 0.76–1.27)
EGFRCR SERPLBLD CKD-EPI 2021: 69.5 ML/MIN/1.73
GLOBULIN UR ELPH-MCNC: 2.3 GM/DL
GLUCOSE SERPL-MCNC: 96 MG/DL (ref 65–99)
HDLC SERPL-MCNC: 56 MG/DL (ref 40–60)
LDLC SERPL CALC-MCNC: 46 MG/DL (ref 0–100)
LDLC/HDLC SERPL: 0.88 {RATIO}
POTASSIUM SERPL-SCNC: 3.9 MMOL/L (ref 3.5–5.2)
PROT SERPL-MCNC: 6.6 G/DL (ref 6–8.5)
SODIUM SERPL-SCNC: 139 MMOL/L (ref 136–145)
TRIGL SERPL-MCNC: 40 MG/DL (ref 0–150)
VLDLC SERPL-MCNC: 11 MG/DL (ref 5–40)

## 2024-07-05 PROCEDURE — 36415 COLL VENOUS BLD VENIPUNCTURE: CPT | Performed by: NURSE PRACTITIONER

## 2024-07-05 PROCEDURE — 80061 LIPID PANEL: CPT | Performed by: NURSE PRACTITIONER

## 2024-07-05 PROCEDURE — 80053 COMPREHEN METABOLIC PANEL: CPT | Performed by: NURSE PRACTITIONER

## 2024-07-08 ENCOUNTER — OFFICE VISIT (OUTPATIENT)
Dept: CARDIOLOGY | Facility: CLINIC | Age: 69
End: 2024-07-08
Payer: COMMERCIAL

## 2024-07-08 VITALS
DIASTOLIC BLOOD PRESSURE: 74 MMHG | OXYGEN SATURATION: 96 % | WEIGHT: 161 LBS | HEIGHT: 74 IN | HEART RATE: 84 BPM | BODY MASS INDEX: 20.66 KG/M2 | SYSTOLIC BLOOD PRESSURE: 110 MMHG

## 2024-07-08 DIAGNOSIS — I10 ESSENTIAL HYPERTENSION: ICD-10-CM

## 2024-07-08 DIAGNOSIS — I47.10 PAROXYSMAL SUPRAVENTRICULAR TACHYCARDIA: ICD-10-CM

## 2024-07-08 DIAGNOSIS — I65.22 CAROTID STENOSIS, ASYMPTOMATIC, LEFT: Primary | ICD-10-CM

## 2024-07-08 DIAGNOSIS — I65.29 STENOSIS OF CAROTID ARTERY, UNSPECIFIED LATERALITY: ICD-10-CM

## 2024-07-08 DIAGNOSIS — E78.5 DYSLIPIDEMIA: ICD-10-CM

## 2024-07-08 DIAGNOSIS — I47.10 PAROXYSMAL SUPRAVENTRICULAR TACHYCARDIA: Primary | ICD-10-CM

## 2024-07-08 DIAGNOSIS — I65.22 CAROTID STENOSIS, ASYMPTOMATIC, LEFT: ICD-10-CM

## 2024-07-08 PROCEDURE — 99214 OFFICE O/P EST MOD 30 MIN: CPT | Performed by: INTERNAL MEDICINE

## 2024-07-08 RX ORDER — AMOXICILLIN 500 MG/1
500 TABLET, FILM COATED ORAL 3 TIMES DAILY
COMMUNITY
Start: 2024-07-02

## 2024-07-08 RX ORDER — CHOLECALCIFEROL (VITAMIN D3) 1250 MCG
50000 CAPSULE ORAL
COMMUNITY
Start: 2024-01-01

## 2024-07-08 RX ORDER — HYDROCODONE BITARTRATE AND ACETAMINOPHEN 5; 325 MG/1; MG/1
TABLET ORAL
COMMUNITY
Start: 2024-07-05

## 2024-07-08 NOTE — PROGRESS NOTES
[facility]  Subjective:     Encounter Date: 07/08/2024      Patient ID: Boone Riley is a 68 y.o. male.    Chief Complaint: Hypertension (Pt  has vertigo on a weekly to bi weekly occasions.  )      PROBLEM LIST:  Paroxysmal SVT   S/p remote cardiac ablation reportedly at [facility]  Holter, 04/23/2024: Frequent, non-sustained SVT, longest episode 12 beats, rate 130 bpm.. 6.6% supra-ventricular ectopy. 1 ventricular triplet, rare PVCs.   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 Ballesteros  Carotid duplex, 04/18/2024: Bilateral ICA <50% stenosis. Left proximal to mid carotid artery stent is patent without evidence of in-stent restenosis.   Hypertension  Dyslipidemia  Hypothyroidism  History of oral cancer  S/p left modified radical neck dissection and radiation therapy      History of Present Illness  Boone Riley returns today for a hospital follow up with a history of PSVT, carotid stenosis, and cardiac risk factors. Since last visit, patient has been doing well overall from a cardiovascular standpoint. He reports he previously had base of the tongue cancer and had several radiation treatments. Patient questions if plaque is the cause of his blockage despite his good cholesterol readings and was advised. He stays busy and active by walking. Patient notes some episodes of dizziness and lightheadedness that resolves once his feet are elevated. He states during these episodes his blood pressure reading is as low as 80/60 mmHg. Patient reports difficulty keeping weight on as he still has difficulty swallowing. He denies chest pain, shortness of breath, orthopnea, palpitations, edema, and syncope.     No Known Allergies      Current Outpatient Medications:     amoxicillin (AMOXIL) 500 MG tablet, Take 1 tablet by mouth 3 times a day., Disp: ,  "Rfl:     aspirin 81 MG chewable tablet, Chew 1 tablet Daily., Disp: 30 tablet, Rfl: 5    atorvastatin (Lipitor) 20 MG tablet, Take 1 tablet by mouth Daily., Disp: 90 tablet, Rfl: 1    Cholecalciferol (Vitamin D3) 1.25 MG (39179 UT) capsule, Take 1 capsule by mouth., Disp: , Rfl:     HYDROcodone-acetaminophen (NORCO) 5-325 MG per tablet, TAKE 1 TABLET EVERY 4-6 HRS AS NEEDED FOR PAIN, Disp: , Rfl:     NIFEdipine XL (PROCARDIA XL) 30 MG 24 hr tablet, TAKE 1 TABLET BY MOUTH EVERY DAY, Disp: 90 tablet, Rfl: 0    pantoprazole (PROTONIX) 40 MG EC tablet, Take 1 tablet by mouth Daily., Disp: 90 tablet, Rfl: 1    sildenafil (REVATIO) 20 MG tablet, 2-5 PO as needed for sexual activity, Disp: 50 tablet, Rfl: 2    Synthroid 100 MCG tablet, Take 1 tablet by mouth Daily., Disp: 30 tablet, Rfl: 2    The following portions of the patient's history were reviewed and updated as appropriate: allergies, current medications, past family history, past medical history, past social history, past surgical history and problem list.    ROS       Objective:     Vitals:    07/08/24 1528   BP: 110/74   BP Location: Right arm   Patient Position: Sitting   Cuff Size: Adult   Pulse: 84   SpO2: 96%   Weight: 73 kg (161 lb)   Height: 188 cm (74.02\")         Vitals reviewed.   Constitutional:       Appearance: Well-developed and not in distress.   Neck:      Thyroid: No thyromegaly.      Vascular: No carotid bruit or JVD.   Pulmonary:      Breath sounds: Normal breath sounds.   Cardiovascular:      Regular rhythm.      No gallop. No S3 and S4 gallop.   Pulses:     Intact distal pulses.      Carotid: 2+ bilaterally.     Radial: 2+ bilaterally.  Edema:     Peripheral edema absent.   Abdominal:      General: Bowel sounds are normal.      Palpations: Abdomen is soft. There is no abdominal mass.      Tenderness: There is no abdominal tenderness.   Musculoskeletal:         General: No deformity.      Extremities: No clubbing present.Skin:     General: Skin " is warm and dry.      Findings: No rash.   Neurological:      Mental Status: Alert and oriented to person, place, and time.         Lab Review:  Lab Results   Component Value Date    GLUCOSE 96 07/05/2024    BUN 20 07/05/2024    CREATININE 1.16 07/05/2024    BCR 17.2 07/05/2024    K 3.9 07/05/2024    CO2 27.0 07/05/2024    CALCIUM 9.2 07/05/2024    ALBUMIN 4.3 07/05/2024    ALKPHOS 63 07/05/2024    AST 20 07/05/2024    ALT 14 07/05/2024     Lab Results   Component Value Date    CHOL 113 07/05/2024    TRIG 40 07/05/2024    HDL 56 07/05/2024    LDL 46 07/05/2024      Lab Results   Component Value Date    WBC 7.16 04/22/2024    RBC 5.33 04/22/2024    HGB 14.8 04/22/2024    HCT 44.2 04/22/2024    MCV 82.9 04/22/2024     04/22/2024     Lab Results   Component Value Date    TSH 0.911 04/16/2024       Procedures      Advance Care Planning   ACP discussion was held with the patient during this visit. Patient has an advance directive in EMR which is still valid.            Assessment:   Diagnoses and all orders for this visit:    1. Paroxysmal supraventricular tachycardia (Primary)    2. Carotid stenosis, asymptomatic, left    3. Essential hypertension    4. Dyslipidemia        Impression:  1. Paroxysmal supraventricular tachycardia (Primary). Stable and asymptomatic. Continue on aspirin 81 mg for antiplatelet therapy.  Now rare brief episodes, do not appear to be correlated with symptoms.    2. Carotid stenosis, asymptomatic, left. Stable and asymptomatic.  Status post left carotid stent    3. Essential hypertension. Well controlled.     4. Dyslipidemia. Well controlled. Continue on atorvastatin 20 mg daily for hyperlipidemia.     5.  PVCs minimally symptomatic structurally normal heart.    Plan:  Stable cardiac status.   GXT echo to evaluate ischemic burden given known atherosclerotic vascular disease  Continue current medications.  Revisit in 12 MO with carotid duplex, or sooner as needed.        Scribed for  Christiano García MD by Jeanie Holt. 7/8/2024 15:49 EDT  Christiano García MD      Please note that portions of this note may have been completed with a voice recognition program. Efforts were made to edit the dictations, but occasionally words are mistranscribed.

## 2024-07-15 ENCOUNTER — TELEPHONE (OUTPATIENT)
Dept: INTERNAL MEDICINE | Facility: CLINIC | Age: 69
End: 2024-07-15
Payer: COMMERCIAL

## 2024-07-15 DIAGNOSIS — R93.89 ABNORMAL CHEST CT: ICD-10-CM

## 2024-07-15 DIAGNOSIS — Z85.9 HISTORY OF CANCER: ICD-10-CM

## 2024-07-15 DIAGNOSIS — J18.9 PNEUMONIA DUE TO INFECTIOUS ORGANISM, UNSPECIFIED LATERALITY, UNSPECIFIED PART OF LUNG: Primary | ICD-10-CM

## 2024-07-15 RX ORDER — AZITHROMYCIN 200 MG/5ML
POWDER, FOR SUSPENSION ORAL
Qty: 54 ML | Refills: 0 | Status: SHIPPED | OUTPATIENT
Start: 2024-07-15 | End: 2024-07-17

## 2024-07-15 NOTE — TELEPHONE ENCOUNTER
----- Message from Yesy Kearney sent at 7/15/2024  1:07 PM EDT -----  Patient telephoned and discussed results of CT scan.  We will cover with a round of antibiotics and refer to pulmonary for urgent consult.  He prefers liquid antibiotic due to difficulty swallowing secondary to history of throat cancer.

## 2024-07-17 ENCOUNTER — OFFICE VISIT (OUTPATIENT)
Dept: PULMONOLOGY | Facility: CLINIC | Age: 69
End: 2024-07-17
Payer: COMMERCIAL

## 2024-07-17 VITALS
HEART RATE: 94 BPM | OXYGEN SATURATION: 96 % | HEIGHT: 74 IN | RESPIRATION RATE: 18 BRPM | DIASTOLIC BLOOD PRESSURE: 84 MMHG | BODY MASS INDEX: 20.41 KG/M2 | TEMPERATURE: 98.2 F | SYSTOLIC BLOOD PRESSURE: 140 MMHG | WEIGHT: 159 LBS

## 2024-07-17 DIAGNOSIS — C14.0 THROAT CANCER: Primary | ICD-10-CM

## 2024-07-17 DIAGNOSIS — J18.9 PNEUMONIA OF LEFT LOWER LOBE DUE TO INFECTIOUS ORGANISM: ICD-10-CM

## 2024-07-17 DIAGNOSIS — T17.908A CHRONIC PULMONARY ASPIRATION, INITIAL ENCOUNTER: ICD-10-CM

## 2024-07-17 RX ORDER — PREDNISONE 10 MG/1
TABLET ORAL
Qty: 31 TABLET | Refills: 0 | Status: SHIPPED | OUTPATIENT
Start: 2024-07-17

## 2024-07-17 NOTE — PROGRESS NOTES
New Patient Pulmonary Office Visit      Patient Name: Boone Riley    Referring Physician: Yesy Kearney AP*    Chief Complaint:    Chief Complaint   Patient presents with    Abnormal Chest CT    Cough       History of Present Illness: Boone Riley is a 68 y.o. male who is here today to establish care with Pulmonary.  Patient is a past medical significant for head neck cancer, GERD, and hypothyroidism.  He was referred to pulmonary for evaluation of abnormal CT scan.  Patient states that he has history of head neck cancer.  Treated with radiation around 2009.  Does have a chronic cough.  And had a PET scan for following up some of his cancer history which showed a groundglass lesion in the left lower lobe that was minimally PET positive.  This ended up having a follow-up CT scan in July 2024 which showed that the groundglass lesion in left lower lobe was still present.  He had done a round of antibiotics with amoxicillin and azithromycin.  Denies any chest pain, nausea, fever, or chills.    Review of Systems:   Review of Systems   Constitutional:  Negative for chills, fatigue and fever.   HENT:  Negative for congestion and voice change.    Eyes:  Negative for blurred vision.   Respiratory:  Negative for cough, shortness of breath and wheezing.    Cardiovascular:  Negative for chest pain.   Skin:  Negative for dry skin.   Hematological:  Negative for adenopathy.   Psychiatric/Behavioral:  Negative for agitation and depressed mood.        Past Medical History:   Past Medical History:   Diagnosis Date    Allergic     Seasonal    Carotid artery occlusion 3/28/2024    CTA report, same date    Cervical disc disorder Seceral years    Noted in CTA report 3/28/2024    Diverticulosis of small intestine without hemorrhage     Dyslipidemia 7/8/2024    Erectile dysfunction 2018    Esophageal reflux     Essential and other specified forms of tremor     General medical exam     HL (hearing loss) 2018    Began  with chemo in 2009, not severe    Hypertension     Hypertrophy (benign) of prostate     Hypothyroidism     Paroxysmal supraventricular tachycardia     Peripheral neuropathy Seversl years    Left hand, thumb snd index finger numb    Screening PSA (prostate specific antigen)     per Urology    Sleep disorder     Spinal stenosis of cervical region     Throat cancer 2008    base of tongue    Tremor 2010    Diagnosed essential tremor       Past Surgical History:   Past Surgical History:   Procedure Laterality Date    CARDIAC ABLATION  2008 or 2009?    CAROTID STENT Left 04/18/2024    Procedure: Carotid Stent;  Surgeon: Ciro Ballesteros MD;  Location: [facility] INVASIVE LOCATION;  Service: Interventional Radiology;  Laterality: Left;    COLONOSCOPY  2008 due 2018    CORONARY STENT PLACEMENT  4/18/2024    Left corotid, Kaylynn Mays    ENDOMETRIAL ABLATION      ENDOSCOPY  01/2015    Dr. Gallo- [facility]    NECK DISSECTION      Dr. CAMACHO    PROSTATE SURGERY  07/20/2022    [facility]       Family History:   Family History   Problem Relation Age of Onset    Breast cancer Mother     Cancer Mother         Breast Cancer    Early death Mother         Due to untreated breast cancer, age 53    Lung cancer Father     Alcohol abuse Father     Cancer Father         Lung cancer, lifetime smoker    Diabetes Brother         Type 2 onset approx age 55       Social History:   Social History     Socioeconomic History    Marital status:    Tobacco Use    Smoking status: Never     Passive exposure: Never    Smokeless tobacco: Never   Vaping Use    Vaping status: Never Used   Substance and Sexual Activity    Alcohol use: No    Drug use: No    Sexual activity: Yes     Partners: Female     Birth control/protection: None     Comment:        Medications:     Current Outpatient Medications:     aspirin 81 MG chewable tablet, Chew 1 tablet Daily., Disp: 30 tablet, Rfl: 5    atorvastatin (Lipitor) 20 MG tablet, Take 1 tablet by  "mouth Daily., Disp: 90 tablet, Rfl: 1    Cholecalciferol (Vitamin D3) 1.25 MG (42287 UT) capsule, Take 1 capsule by mouth., Disp: , Rfl:     NIFEdipine XL (PROCARDIA XL) 30 MG 24 hr tablet, TAKE 1 TABLET BY MOUTH EVERY DAY, Disp: 90 tablet, Rfl: 0    pantoprazole (PROTONIX) 40 MG EC tablet, Take 1 tablet by mouth Daily., Disp: 90 tablet, Rfl: 1    sildenafil (REVATIO) 20 MG tablet, 2-5 PO as needed for sexual activity, Disp: 50 tablet, Rfl: 2    Synthroid 100 MCG tablet, Take 1 tablet by mouth Daily., Disp: 30 tablet, Rfl: 2    predniSONE (DELTASONE) 10 MG tablet, Take 4 tabs daily x 4 days, then take 3 daily x 3 days, then take 2 daily for 2 days, then take 1 daily x 2 days, Disp: 31 tablet, Rfl: 0    Allergies:   No Known Allergies    Physical Exam:  Vital Signs:   Vitals:    07/17/24 0827   BP: 140/84   Pulse: 94   Resp: 18   Temp: 98.2 °F (36.8 °C)   SpO2: 96%  Comment: room air at resting   Weight: 72.1 kg (159 lb)   Height: 188 cm (74.02\")       Physical Exam  Vitals and nursing note reviewed.   Constitutional:       General: He is not in acute distress.     Appearance: He is well-developed and normal weight. He is not ill-appearing or toxic-appearing.   Cardiovascular:      Rate and Rhythm: Normal rate and regular rhythm.      Pulses: Normal pulses.      Heart sounds: Normal heart sounds. No murmur heard.     No gallop.   Pulmonary:      Effort: Pulmonary effort is normal.      Breath sounds: Normal breath sounds. No wheezing, rhonchi or rales.   Musculoskeletal:      Right lower leg: No edema.      Left lower leg: No edema.   Neurological:      Mental Status: He is alert.         Immunization History   Administered Date(s) Administered    COVID-19 (MODERNA) 1st,2nd,3rd Dose Monovalent 12/23/2020, 01/22/2021    Hepatitis A 02/05/2019, 08/16/2019    Influenza, Unspecified 10/01/2015, 09/01/2018, 10/01/2020, 10/01/2022    Pneumococcal Conjugate 20-Valent (PCV20) 03/20/2023    Pneumococcal Polysaccharide " (PPSV23) 02/27/2020    Shingrix 06/01/2022, 08/01/2022    Tdap 07/21/2014       Results Review:   - I personally reviewed the pts imaging from CT scan of the chest from July 2024, showed fibrotic apical changes centrally bilaterally with some bronchiectasis associated with him, and then there is a left lower lobe groundglass infiltrate more consistent with pneumonia.  Also reviewed the PET scan from 4/9/2024 in which the upper lobe scarring has been present and was PET negative, the CT scan of the neck from 2009 showed that there was no scarring in the upper lobes at that time.  - I personally reviewed the pts PFT from 7/17/2024 shows no obstruction or restriction with a normal DLCO.  - I personally reviewed the pts chart with regards to evaluation with the patient's PCP    Assessment / Plan:   Diagnoses and all orders for this visit:    1. Throat cancer (Primary)  2. Chronic pulmonary aspiration, initial encounter  3. Non-resoliving pneumonia  -I reviewed his imaging with him.  The left lower lobe groundglass changes, appear to be more of an infiltrate such as pneumonia, and has possibly worsened a little bit since the PET scan in April but at the same time imaging differences need to be taken into account.  With his history I think chronic aspiration would be a very high likelihood of the cause of this.  Although infectious or inflammatory options are still possible.  We did discuss malignancy option on today's visit.  I think that to be less likely the type of cancer that would look like this would be alveolar carcinoma and it would not generally change so much over such a short timeframe.  I did offer to do bronchoscopy at this time but I do not necessarily think it is needed.  I think we should have a more noninvasive workup first.  -Going to go ahead and get a modified barium swallow to see if there are any signs of aspiration  -I gave him a prednisone taper I want her to finish out and also want to finish out  his antibiotics  -He was placed on reflux precautions he is already on Protonix 40 mg daily and then I want him to elevate the head of the bed 2 to 6 inches, not eat or drink 2 hours prior to going to sleep.  He was given education sheet on today's visit.  -Will plan to repeat a CT scan in 3 months and see if the area on the left lower lobe still present or changing any further.  If it is then we consider bronchoscopy at that time.      Follow Up:   Return in about 3 months (around 10/17/2024) for CT Chest with next visit.     BOOGIE Balbuena, DO  Pulmonary and Critical Care Medicine  Note Electronically Signed    Part of this note may be an electronic transcription/translation of spoken language to printed text using the Dragon Dictation System.

## 2024-07-21 DIAGNOSIS — I10 BENIGN ESSENTIAL HYPERTENSION: ICD-10-CM

## 2024-07-22 RX ORDER — NIFEDIPINE 30 MG/1
30 TABLET, EXTENDED RELEASE ORAL DAILY
Qty: 90 TABLET | Refills: 0 | Status: SHIPPED | OUTPATIENT
Start: 2024-07-22

## 2024-07-22 NOTE — TELEPHONE ENCOUNTER
Rx Refill Note  Requested Prescriptions     Pending Prescriptions Disp Refills    NIFEdipine XL (PROCARDIA XL) 30 MG 24 hr tablet [Pharmacy Med Name: NIFEdipine ER Osmotic Release Oral Tablet Extended Release 24 Hour 30 MG] 90 tablet 0     Sig: TAKE 1 TABLET BY MOUTH EVERY DAY      Last office visit with prescribing clinician: 6/27/2024   Last telemedicine visit with prescribing clinician: Visit date not found   Next office visit with prescribing clinician: 10/14/2024       Ruma Pena MA  07/22/24, 08:50 EDT

## 2024-07-24 ENCOUNTER — PRIOR AUTHORIZATION (OUTPATIENT)
Dept: INTERNAL MEDICINE | Facility: CLINIC | Age: 69
End: 2024-07-24
Payer: COMMERCIAL

## 2024-07-26 DIAGNOSIS — E03.9 ACQUIRED HYPOTHYROIDISM: ICD-10-CM

## 2024-07-26 DIAGNOSIS — I44.0 1ST DEGREE AV BLOCK: ICD-10-CM

## 2024-07-26 DIAGNOSIS — R00.2 PALPITATIONS: ICD-10-CM

## 2024-07-26 DIAGNOSIS — K21.9 GASTROESOPHAGEAL REFLUX DISEASE, UNSPECIFIED WHETHER ESOPHAGITIS PRESENT: ICD-10-CM

## 2024-07-29 RX ORDER — LEVOTHYROXINE SODIUM 100 MCG
100 TABLET ORAL DAILY
Qty: 30 TABLET | Refills: 2 | Status: SHIPPED | OUTPATIENT
Start: 2024-07-29

## 2024-07-29 NOTE — TELEPHONE ENCOUNTER
Rx Refill Note  Requested Prescriptions     Pending Prescriptions Disp Refills    Synthroid 100 MCG tablet [Pharmacy Med Name: SYNTHROID 0.1MG (100MCG)TABLETS] 30 tablet 2     Sig: TAKE 1 TABLET BY MOUTH DAILY      Last office visit with prescribing clinician: 6/27/2024   Last telemedicine visit with prescribing clinician: Visit date not found   Next office visit with prescribing clinician: 10/14/2024       Ruma Pena MA  07/29/24, 08:38 EDT

## 2024-08-06 ENCOUNTER — HOSPITAL ENCOUNTER (OUTPATIENT)
Dept: CARDIOLOGY | Facility: HOSPITAL | Age: 69
Discharge: HOME | End: 2024-08-06
Admitting: INTERNAL MEDICINE
Payer: COMMERCIAL

## 2024-08-06 ENCOUNTER — TRANSCRIBE ORDERS (OUTPATIENT)
Dept: ADMINISTRATIVE | Facility: HOSPITAL | Age: 69
End: 2024-08-06
Payer: COMMERCIAL

## 2024-08-06 VITALS
WEIGHT: 158.95 LBS | OXYGEN SATURATION: 99 % | DIASTOLIC BLOOD PRESSURE: 86 MMHG | BODY MASS INDEX: 20.4 KG/M2 | SYSTOLIC BLOOD PRESSURE: 140 MMHG | HEART RATE: 80 BPM | HEIGHT: 74 IN

## 2024-08-06 DIAGNOSIS — I65.29 STENOSIS OF CAROTID ARTERY, UNSPECIFIED LATERALITY: ICD-10-CM

## 2024-08-06 DIAGNOSIS — I47.10 PAROXYSMAL SUPRAVENTRICULAR TACHYCARDIA: ICD-10-CM

## 2024-08-06 DIAGNOSIS — I65.22 CAROTID STENOSIS, ASYMPTOMATIC, LEFT: ICD-10-CM

## 2024-08-06 LAB
ASCENDING AORTA: 3.2 CM
BH CV ECHO MEAS - AO MAX PG: 2.7 MMHG
BH CV ECHO MEAS - AO MEAN PG: 1.8 MMHG
BH CV ECHO MEAS - AO ROOT DIAM: 3.8 CM
BH CV ECHO MEAS - AO V2 MAX: 82.9 CM/SEC
BH CV ECHO MEAS - AO V2 VTI: 19.3 CM
BH CV ECHO MEAS - EF(MOD-BP): 52.5 %
BH CV ECHO MEAS - IVS/LVPW: 1 CM
BH CV ECHO MEAS - IVSD: 1.1 CM
BH CV ECHO MEAS - LA DIMENSION: 3 CM
BH CV ECHO MEAS - LAT PEAK E' VEL: 9.6 CM/SEC
BH CV ECHO MEAS - LV MAX PG: 2.39 MMHG
BH CV ECHO MEAS - LV MEAN PG: 1.14 MMHG
BH CV ECHO MEAS - LV V1 MAX: 77.3 CM/SEC
BH CV ECHO MEAS - LV V1 VTI: 17.3 CM
BH CV ECHO MEAS - LVIDD: 3.3 CM
BH CV ECHO MEAS - LVIDS: 2.4 CM
BH CV ECHO MEAS - LVOT DIAM: 2.1 CM
BH CV ECHO MEAS - LVPWD: 1.1 CM
BH CV ECHO MEAS - MED PEAK E' VEL: 6.8 CM/SEC
BH CV ECHO MEAS - MV A MAX VEL: 65.2 CM/SEC
BH CV ECHO MEAS - MV DEC SLOPE: 453.5 CM/SEC2
BH CV ECHO MEAS - MV E MAX VEL: 68.6 CM/SEC
BH CV ECHO MEAS - MV E/A: 1.05
BH CV ECHO MEAS - MV MAX PG: 2.32 MMHG
BH CV ECHO MEAS - MV MEAN PG: 1.4 MMHG
BH CV ECHO MEAS - MV P1/2T: 49 MSEC
BH CV ECHO MEAS - MV V2 VTI: 18.2 CM
BH CV ECHO MEAS - MVA(P1/2T): 4.5 CM2
BH CV ECHO MEAS - PA ACC TIME: 0.19 SEC
BH CV ECHO MEAS - PA V2 MAX: 80.4 CM/SEC
BH CV ECHO MEAS - RAP SYSTOLE: 3 MMHG
BH CV ECHO MEAS - RVSP: 22 MMHG
BH CV ECHO MEAS - TAPSE (>1.6): 2.16 CM
BH CV ECHO MEAS - TR MAX PG: 19 MMHG
BH CV ECHO MEAS - TR MAX VEL: 217 CM/SEC
BH CV ECHO MEASUREMENTS AVERAGE E/E' RATIO: 8.37
BH CV STRESS BP STAGE 1: NORMAL
BH CV STRESS BP STAGE 2: NORMAL
BH CV STRESS DURATION MIN STAGE 1: 3
BH CV STRESS DURATION MIN STAGE 2: 3
BH CV STRESS DURATION MIN STAGE 3: 2
BH CV STRESS DURATION SEC STAGE 1: 0
BH CV STRESS DURATION SEC STAGE 2: 0
BH CV STRESS DURATION SEC STAGE 3: 10
BH CV STRESS GRADE STAGE 1: 10
BH CV STRESS GRADE STAGE 2: 12
BH CV STRESS GRADE STAGE 3: 14
BH CV STRESS HR STAGE 1: 115
BH CV STRESS HR STAGE 2: 136
BH CV STRESS HR STAGE 3: 176
BH CV STRESS METS STAGE 1: 5
BH CV STRESS METS STAGE 2: 7.5
BH CV STRESS METS STAGE 3: 10
BH CV STRESS O2 STAGE 1: 99
BH CV STRESS O2 STAGE 2: 97
BH CV STRESS O2 STAGE 3: 96
BH CV STRESS PROTOCOL 1: NORMAL
BH CV STRESS RECOVERY BP: NORMAL MMHG
BH CV STRESS RECOVERY HR: 91 BPM
BH CV STRESS RECOVERY O2: 97 %
BH CV STRESS SPEED STAGE 1: 1.7
BH CV STRESS SPEED STAGE 2: 2.5
BH CV STRESS SPEED STAGE 3: 3.4
BH CV STRESS STAGE 1: 1
BH CV STRESS STAGE 2: 2
BH CV STRESS STAGE 3: 3
BH CV XLRA - RV BASE: 3.6 CM
BH CV XLRA - RV LENGTH: 6.8 CM
BH CV XLRA - RV MID: 2.6 CM
BH CV XLRA - TDI S': 12.1 CM/SEC
LEFT ATRIUM VOLUME INDEX: 17 ML/M2
LV EF 2D ECHO EST: 55 %
MAXIMAL PREDICTED HEART RATE: 154 BPM
PERCENT MAX PREDICTED HR: 116.23 %
STRESS BASELINE BP: NORMAL MMHG
STRESS BASELINE HR: 72 BPM
STRESS O2 SAT REST: 99 %
STRESS PERCENT HR: 137 %
STRESS POST ESTIMATED WORKLOAD: 10.1 METS
STRESS POST EXERCISE DUR MIN: 8 MIN
STRESS POST EXERCISE DUR SEC: 10 SEC
STRESS POST O2 SAT PEAK: 96 %
STRESS POST PEAK BP: NORMAL MMHG
STRESS POST PEAK HR: 179 BPM
STRESS TARGET HR: 131 BPM

## 2024-08-06 PROCEDURE — 93320 DOPPLER ECHO COMPLETE: CPT

## 2024-08-06 PROCEDURE — 93350 STRESS TTE ONLY: CPT

## 2024-08-06 PROCEDURE — 93350 STRESS TTE ONLY: CPT | Performed by: INTERNAL MEDICINE

## 2024-08-06 PROCEDURE — 25010000002 SULFUR HEXAFLUORIDE MICROSPH 60.7-25 MG RECONSTITUTED SUSPENSION: Performed by: INTERNAL MEDICINE

## 2024-08-06 PROCEDURE — 93017 CV STRESS TEST TRACING ONLY: CPT

## 2024-08-06 PROCEDURE — 93320 DOPPLER ECHO COMPLETE: CPT | Performed by: INTERNAL MEDICINE

## 2024-08-06 PROCEDURE — 93352 ADMIN ECG CONTRAST AGENT: CPT | Performed by: INTERNAL MEDICINE

## 2024-08-06 PROCEDURE — 93325 DOPPLER ECHO COLOR FLOW MAPG: CPT

## 2024-08-06 PROCEDURE — 93325 DOPPLER ECHO COLOR FLOW MAPG: CPT | Performed by: INTERNAL MEDICINE

## 2024-08-06 PROCEDURE — 93018 CV STRESS TEST I&R ONLY: CPT | Performed by: INTERNAL MEDICINE

## 2024-08-06 RX ADMIN — SULFUR HEXAFLUORIDE 5 ML: KIT at 08:30

## 2024-08-13 DIAGNOSIS — K57.10 DIVERTICULOSIS OF SMALL INTESTINE WITHOUT HEMORRHAGE: ICD-10-CM

## 2024-08-13 DIAGNOSIS — K21.9 GASTROESOPHAGEAL REFLUX DISEASE, UNSPECIFIED WHETHER ESOPHAGITIS PRESENT: ICD-10-CM

## 2024-08-13 DIAGNOSIS — R13.10 DYSPHAGIA, UNSPECIFIED TYPE: Primary | ICD-10-CM

## 2024-08-27 ENCOUNTER — HOSPITAL ENCOUNTER (OUTPATIENT)
Dept: GENERAL RADIOLOGY | Facility: HOSPITAL | Age: 69
End: 2024-08-27
Payer: COMMERCIAL

## 2024-08-27 ENCOUNTER — HOSPITAL ENCOUNTER (OUTPATIENT)
Dept: GENERAL RADIOLOGY | Facility: HOSPITAL | Age: 69
Discharge: HOME | End: 2024-08-27
Admitting: INTERNAL MEDICINE
Payer: COMMERCIAL

## 2024-08-27 DIAGNOSIS — K57.10 DIVERTICULOSIS OF SMALL INTESTINE WITHOUT HEMORRHAGE: ICD-10-CM

## 2024-08-27 DIAGNOSIS — K21.9 GASTROESOPHAGEAL REFLUX DISEASE, UNSPECIFIED WHETHER ESOPHAGITIS PRESENT: ICD-10-CM

## 2024-08-27 DIAGNOSIS — R13.10 DYSPHAGIA, UNSPECIFIED TYPE: ICD-10-CM

## 2024-08-27 PROCEDURE — 74230 X-RAY XM SWLNG FUNCJ C+: CPT

## 2024-08-27 PROCEDURE — 92611 MOTION FLUOROSCOPY/SWALLOW: CPT

## 2024-08-27 RX ADMIN — BARIUM SULFATE 100 ML: 0.81 POWDER, FOR SUSPENSION ORAL at 09:09

## 2024-08-27 RX ADMIN — BARIUM SULFATE 20 ML: 400 PASTE ORAL at 09:09

## 2024-08-27 NOTE — MBS/VFSS/FEES
Outpatient Speech Language Pathology   Adult Swallow Initial Evaluation  Modified Barium Swallow Study (MBS)  MARLYN Tate     Patient Name: Boone Riley  : 1955  MRN: [id number]  Today's Date: 2024         Visit Date: 2024   Patient Active Problem List   Diagnosis    Throat cancer    Paroxysmal supraventricular tachycardia    Spinal stenosis of cervical region    Essential and other specified forms of tremor    Hypertrophy (benign) of prostate    Hypothyroidism    Esophageal reflux    Essential hypertension    Impotence of organic origin    Diverticulosis of small intestine (without mention of hemorrhage)    Neoplasm of uncertain behavior of lip, oral cavity, and pharynx    Sleep disorder    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    Disturbance of salivary secretion    Generalized enlarged lymph nodes    Carotid stenosis, asymptomatic, left    Dysphagia    Carotid artery stenosis    Dyslipidemia        Past Medical History:   Diagnosis Date    Allergic     Seasonal    Carotid artery occlusion 3/28/2024    CTA report, same date    Cervical disc disorder Seceral years    Noted in CTA report 3/28/2024    Diverticulosis of small intestine without hemorrhage     Dyslipidemia 2024    Erectile dysfunction 2018    Esophageal reflux     Essential and other specified forms of tremor     General medical exam     HL (hearing loss) 2018    Began with chemo in , not severe    Hypertension     Hypertrophy (benign) of prostate     Hypothyroidism     Paroxysmal supraventricular tachycardia     Peripheral neuropathy Seversl years    Left hand, thumb snd index finger numb    Screening PSA (prostate specific antigen)     per Urology    Sleep disorder     Spinal stenosis of cervical region     Throat cancer 2008    base of tongue    Tremor 2010    Diagnosed essential tremor        Past Surgical History:   Procedure Laterality Date    CARDIAC ABLATION   or 2009?    CAROTID STENT Left 2024     Procedure: Carotid Stent;  Surgeon: Ciro Ballesteros MD;  Location: [facility] CATH INVASIVE LOCATION;  Service: Interventional Radiology;  Laterality: Left;    COLONOSCOPY  2008 due 2018    CORONARY STENT PLACEMENT  4/18/2024    Left corotid, Dr Ballesteros, [facility]    ENDOMETRIAL ABLATION      ENDOSCOPY  01/2015    Dr. Gallo- [facility]    NECK DISSECTION      Dr. CAMACHO    PROSTATE SURGERY  07/20/2022    [facility]ift         Visit Dx:     ICD-10-CM ICD-9-CM   1. Gastroesophageal reflux disease, unspecified whether esophagitis present  K21.9 530.81   2. Dysphagia, unspecified type  R13.10 787.20   3. Diverticulosis of small intestine without hemorrhage  K57.10 562.00                SLP Adult Swallow Evaluation       Row Name 08/27/24 0898       General Information    Pertinent History Of Current Problem H&N CA with L partial glossectomy and modified radical neck dissection and radiation tx 2009. Hx acute dysphagia post surgery with PEG. Progressed to regular diet with only residual difficulties with certain breads and pills. Over the last year, increased difficulty with swallowing with frequent throat clearing during intake. Imaging revealed groundglass lesion LLL, though ? infection/aspiration. MBS recommended to assess swallow function before proceed with more invasive testing (e.g., bronchoscopy).  -SM       Pain    Additional Documentation Pain Scale: Numbers Pre/Post-Treatment (Group)  -SM       Pain Scale: Numbers Pre/Post-Treatment    Pretreatment Pain Rating 0/10 - no pain  -SM    Posttreatment Pain Rating 0/10 - no pain  -SM       MBS/VFSS    Utensils Used spoon;cup;straw  -SM    Consistencies Trialed thin liquids;pureed;mixed consistency;regular textures;barium pill  -SM       MBS/VFSS Interpretation    Oral Prep Phase impaired oral phase of swallowing  -SM    Oral Transit Phase impaired  -SM    Oral Residue impaired  -SM    Oral Phase, Comment Decreased general oral manipulation of bolus 2' partial glossectomy.  Compensates well and no premature spillage into pharynx observed. Cleared oral residue spontaneously as needed.  -SM       Initiation of Pharyngeal Swallow    Pharyngeal Phase impaired pharyngeal phase of swallowing  -SM    Penetration During the Swallow thin liquids;secondary to reduced laryngeal elevation;secondary to reduced vestibular closure  -SM    Aspiration After the Swallow thin liquids;secondary to residue;in laryngeal vestibule;all consistencies tested;in pyriform sinuses;other (see comments)  squeezing small amounts of residue in pyriform sinuses into airway during subsequent swallows  -SM    Depth of Penetration deep  -SM    Response to Penetration Yes  -SM    Responsed to penetration with throat clear;effective  -SM    Response to Aspiration Yes  -SM    Responded to aspiration with throat clear;effective  -SM    Rosenbek's Scale all consistencies:;6--->level 6  -SM    Pharyngeal Residue diffuse within pharynx;secondary to reduced base of tongue retraction;secondary to reduced posterior pharyngeal wall stripping;secondary to reduced laryngeal elevation;secondary to reduced hyolaryngeal excursion;other (see comments)  greatest posterior pharyngeal wall and pyriform sinuses. Poor hyolaryngeal excursion and subsequently reduced opening of the pharyngoesophageal segment.  -SM    Response to Residue partial residue clearance;cleared residue with spontaneous subsequent swallow  -SM    Attempted Compensatory Maneuvers bolus size;bolus presentation style;chin tuck;head turn to left;effortful (hard swallow);throat clear after swallow  -SM    Response to Attempted Compensatory Maneuvers other (see comments)  throat clear most effective. Use of partial chin tuck + head turn to L + effortful swallow was ineffective at reducing amount of residue  -SM    Pharyngeal Phase, Comment Pt demonstrating dysphagia from late effects of radiation tx. Does present with aspiration of all consistencies though in small amounts  that drop just below level of the vocal folds. Pt spontaneously sensing and seemingly to fuly clear with throat clears. POC options discussed with pt. Given he is spontaneously clearing aspiration, he does not wish to consider alternate means of nutrition. SLP in agreement as appears to be compensating well at this point. Pt aware of potential decline in function with time. Signs and symptoms to monitor were discussed. Provided written education on results, precautions, and potential problematic textures to avoid/modify. Pt does not wish to trial dysphagia tx at this time. SLP also in agreement as not likely to show response this far out post radiation tx. Unfortunately, overall question as to whether lung status r/t potential lesion vs infection/aspiration remains unknown. He is demonstrating aspiration though fully clearing spontaneously. May still benefit from further testing to fully determine.  -       SLP Evaluation Clinical Impression    SLP Swallowing Diagnosis mild;oral dysphagia;moderate;pharyngeal dysphagia  -    Functional Impact risk of aspiration/pneumonia  -       Recommendations    SLP Diet Recommendation regular textures;thin liquids;other (see comments)  avoid/modify non-cohesive textures  -    Recommended Precautions and Strategies upright posture during/after eating;small bites of food and sips of liquid;general aspiration precautions;other (see comments)  to cough/clear throat a few additional times at end of meal/intake in case not sensing all aspiration over course of a meal. Currently, his throat clearing is effective in clearing subglottic and laryngeal vesibule residue.  -    Oral Care Recommendations Oral Care BID/PRN;Toothbrush  -    SLP Rec. for Method of Medication Administration as tolerated  pt doing fairly well taking with water. If any problematic/large pills, to cut/crush in puree as needed (once cleared/discussed with pharmacist and/or MD).  -    Monitor for Signs of  Aspiration yes;other (see comments)  Pt to notify MD if any signs of decline in function. Would recommend repeat MBS at that time.  -SM              User Key  (r) = Recorded By, (t) = Taken By, (c) = Cosigned By      Initials Name Provider Type    Alicia Perea MS CCC-SLP Speech and Language Pathologist                                   OP SLP Education       Row Name 08/27/24 1051       Education    Assessed Learning needs;Learning motivation;Learning preferences;Learning readiness  -    Learning Motivation Strong  -    Learning Method Explanation;Teach back;Written materials  -    Teaching Response Verbalized understanding;Demonstrated understanding  -SM              User Key  (r) = Recorded By, (t) = Taken By, (c) = Cosigned By      Initials Name Effective Dates    Alicia Perea MS CCC-SLP 02/03/23 -                                Time Calculation:   SLP Start Time: 0830  Untimed Charges  17130-FM Motion Fluoro Eval Swallow Minutes: 90  Total Minutes  Untimed Charges Total Minutes: 90   Total Minutes: 90    Therapy Charges for Today       Code Description Service Date Service Provider Modifiers Qty    09447965191 HC ST MOTION FLUORO EVAL SWALLOW 6 8/27/2024 Alicia Real MS CCC-SLP GN 1                     Alicia Real MS CCC-SLP  8/27/2024

## 2024-10-14 ENCOUNTER — OFFICE VISIT (OUTPATIENT)
Dept: INTERNAL MEDICINE | Facility: CLINIC | Age: 69
End: 2024-10-14
Payer: COMMERCIAL

## 2024-10-14 VITALS
OXYGEN SATURATION: 98 % | HEART RATE: 82 BPM | TEMPERATURE: 99.1 F | HEIGHT: 74 IN | WEIGHT: 160.2 LBS | DIASTOLIC BLOOD PRESSURE: 86 MMHG | RESPIRATION RATE: 16 BRPM | BODY MASS INDEX: 20.56 KG/M2 | SYSTOLIC BLOOD PRESSURE: 128 MMHG

## 2024-10-14 DIAGNOSIS — I65.22 STENOSIS OF LEFT CAROTID ARTERY: ICD-10-CM

## 2024-10-14 DIAGNOSIS — Z23 NEED FOR TDAP VACCINATION: ICD-10-CM

## 2024-10-14 DIAGNOSIS — E78.5 DYSLIPIDEMIA: ICD-10-CM

## 2024-10-14 DIAGNOSIS — I47.10 PAROXYSMAL SUPRAVENTRICULAR TACHYCARDIA: ICD-10-CM

## 2024-10-14 DIAGNOSIS — E03.9 ACQUIRED HYPOTHYROIDISM: ICD-10-CM

## 2024-10-14 DIAGNOSIS — D00.06 CARCINOMA IN SITU OF FLOOR OF MOUTH: ICD-10-CM

## 2024-10-14 DIAGNOSIS — I10 ESSENTIAL HYPERTENSION: Primary | ICD-10-CM

## 2024-10-14 PROCEDURE — 90715 TDAP VACCINE 7 YRS/> IM: CPT | Performed by: NURSE PRACTITIONER

## 2024-10-14 PROCEDURE — 99214 OFFICE O/P EST MOD 30 MIN: CPT | Performed by: NURSE PRACTITIONER

## 2024-10-14 PROCEDURE — 90471 IMMUNIZATION ADMIN: CPT | Performed by: NURSE PRACTITIONER

## 2024-10-14 NOTE — LETTER
Psychiatric  Vaccine Consent Form    Patient Name:  Boone Riley  Patient :  1955     Vaccine(s) Ordered    Tdap Vaccine Greater Than or Equal To 8yo IM        Screening Checklist  The following questions should be completed prior to vaccination. If you answer “yes” to any question, it does not necessarily mean you should not be vaccinated. It just means we may need to clarify or ask more questions. If a question is unclear, please ask your healthcare provider to explain it.    Yes No   Any fever or moderate to severe illness today (mild illness and/or antibiotic treatment are not contraindications)?     Do you have a history of a serious reaction to any previous vaccinations, such as anaphylaxis, encephalopathy within 7 days, Guillain-[place] syndrome within 6 weeks, seizure?     Have you received any live vaccine(s) (e.g MMR, JANET) or any other vaccines in the last month (to ensure duplicate doses aren't given)?     Do you have an anaphylactic allergy to latex (DTaP, DTaP-IPV, Hep A, Hep B, MenB, RV, Td, Tdap), baker’s yeast (Hep B, HPV), polysorbates (RSV, nirsevimab, PCV 20, Rotavirrus, Tdap, Shingrix), or gelatin (JANET, MMR)?     Do you have an anaphylactic allergy to neomycin (Rabies, JANET, MMR, IPV, Hep A), polymyxin B (IPV), or streptomycin (IPV)?      Any cancer, leukemia, AIDS, or other immune system disorder? (JANET, MMR, RV)     Do you have a parent, brother, or sister with an immune system problem (if immune competence of vaccine recipient clinically verified, can proceed)? (MMR, JANET)     Any recent steroid treatments for >2 weeks, chemotherapy, or radiation treatment? (JANET, MMR)     Have you received antibody-containing blood transfusions or IVIG in the past 11 months (recommended interval is dependent on product)? (MMR, JANET)     Have you taken antiviral drugs (acyclovir, famciclovir, valacyclovir for JANET) in the last 24 or 48 hours, respectively?      Are you pregnant or planning to become  "pregnant within 1 month? (JANET, MMR, HPV, IPV, MenB, Abrexvy; For Hep B- refer to Engerix-B; For RSV - Abrysvo is indicated for 32-36 weeks of pregnancy from September to January)     For infants, have you ever been told your child has had intussusception or a medical emergency involving obstruction of the intestine (Rotavirus)? If not for an infant, can skip this question.         *Ordering Physicians/APC should be consulted if \"yes\" is checked by the patient or guardian above.  I have received, read, and understand the Vaccine Information Statement (VIS) for each vaccine ordered.  I have considered my or my child's health status as well as the health status of my close contacts.  I have taken the opportunity to discuss my vaccine questions with my or my child's health care provider.   I have requested that the ordered vaccine(s) be given to me or my child.  I understand the benefits and risks of the vaccines.  I understand that I should remain in the clinic for 15 minutes after receiving the vaccine(s).  _________________________________________________________  Signature of Patient or Parent/Legal Guardian ____________________  Date     "

## 2024-10-14 NOTE — PROGRESS NOTES
Subjective   Boone Riley is a 69 y.o. male    Chief Complaint   Patient presents with    Follow-up     6 month follow up. Patient has no questions or concerns.     Hypertension    Hyperlipidemia     History of Present Illness     Frederick works at [employer]. They were running tests at work and did a carotid duplex on him that led to a CTA of the neck which revealed 80 % stenosis of the left carotid. He was seen Dr. Ballesteros, underwent an angiogram, that led to an angioplasty/stent placement for asymptomatic, high-grade left common carotid stenosis on 4/18/2024.  Reports that he is feeling well since surgery.  No complications.       Had PET in relation to this as well. Groundglass was noted in lung. Was referred to Pulm and had recent f/u CT.  Has f/u with them this week     Palps - during his preop work up, he was noted to [id number] degree AV block and RSR pattern in V1, PACs.  At postop follow-up, he did complain of palpitations.  Has worn a Holter since.  Did have some runs of SVT.  Has been referred to cardiology and is scheduled on 7/8/2024.  Discussed beta-blocker but he wishes to hold off until he sees cardiology.     GERD-PPI was changed from omeprazole to pantoprazole at postop visit due to interaction with omeprazole and Plavix.  At his follow-up with Dr. Ballesteros, Plavix was stopped.  He will be on aspirin indefinitely.  States that he is tolerating the pantoprazole well.  No breakthroughs of GERD.     HL-was started on Lipitor 20 mg at last visit.  He stopped taking this a few weeks ago due to SE's.  States that after stopping, he did not feel any better after stopping  Lab Results   Component Value Date    CHOL 113 07/05/2024    TRIG 40 07/05/2024    HDL 56 07/05/2024    LDL 46 07/05/2024     HTN-chronic and stable on nifedipine XL 30 mg 1 p.o. daily.  Blood pressure within acceptable limits today.     Hypothyroid-was changed from generic to brand Synthroid at last visit to see if this would make a difference in  his palpitations.  Unsure if the change to brand Synthroid has had much effect, but his palpitations are improved.     BPH - Seeing Urology ([facility], now [facility]).  Takes PRN meds for ED.  Was given 20mg Sildenafil that he takes PRN/rarely. He did have a Urolift 7/20/2022. He has been able to d/c the flomax       H/O throat (base of tongue) cancer.  Dx'd in 2008.  Followed by Dr. CAMACHO.  All recent reports have been good. Seeing him on a yearly basis now.      Vit D and B12 def - chronic/levels are low.  Not currently taking any supplements     Flu shot - 10/2024 @ work  COVID - 12/23/20, 1/22/21, declines booster  Tdap - 2014, updating today  PSA - 4/2024  colon - 10/1/2018  Hep A - 2/2019, 8/2019    The following portions of the patient's history were reviewed and updated as appropriate: allergies, current medications, past family history, past medical history, past social history, past surgical history, and problem list.    Current Outpatient Medications:     aspirin 81 MG chewable tablet, Chew 1 tablet Daily., Disp: 30 tablet, Rfl: 5    Cholecalciferol (Vitamin D3) 1.25 MG (21815 UT) capsule, Take 1 capsule by mouth., Disp: , Rfl:     NIFEdipine XL (PROCARDIA XL) 30 MG 24 hr tablet, TAKE 1 TABLET BY MOUTH EVERY DAY, Disp: 90 tablet, Rfl: 0    pantoprazole (PROTONIX) 40 MG EC tablet, Take 1 tablet by mouth Daily. (Patient taking differently: Take 1 tablet by mouth As Needed (as needed).), Disp: 90 tablet, Rfl: 1    sildenafil (REVATIO) 20 MG tablet, 2-5 PO as needed for sexual activity, Disp: 50 tablet, Rfl: 2    Synthroid 100 MCG tablet, TAKE 1 TABLET BY MOUTH DAILY, Disp: 30 tablet, Rfl: 2    atorvastatin (Lipitor) 20 MG tablet, Take 1 tablet by mouth Daily. (Patient not taking: Reported on 10/14/2024), Disp: 90 tablet, Rfl: 1    predniSONE (DELTASONE) 10 MG tablet, Take 4 tabs daily x 4 days, then take 3 daily x 3 days, then take 2 daily for 2 days, then take 1 daily x 2 days (Patient not taking: Reported on  "10/14/2024), Disp: 31 tablet, Rfl: 0     Review of Systems   Constitutional:  Positive for fatigue. Negative for chills and fever.   Respiratory:  Negative for cough, chest tightness and shortness of breath.    Cardiovascular:  Negative for chest pain.   Gastrointestinal:  Negative for abdominal pain, diarrhea, nausea and vomiting.   Endocrine: Negative for cold intolerance and heat intolerance.   Musculoskeletal:  Negative for arthralgias.   Neurological:  Negative for dizziness.       Objective   Physical Exam  Constitutional:       Appearance: He is well-developed.   HENT:      Head: Normocephalic and atraumatic.   Eyes:      Conjunctiva/sclera: Conjunctivae normal.      Pupils: Pupils are equal, round, and reactive to light.   Cardiovascular:      Rate and Rhythm: Normal rate and regular rhythm.      Heart sounds: Normal heart sounds.   Pulmonary:      Effort: Pulmonary effort is normal.      Breath sounds: Normal breath sounds.   Abdominal:      General: Bowel sounds are normal.      Palpations: Abdomen is soft.   Musculoskeletal:         General: Normal range of motion.      Cervical back: Normal range of motion.   Skin:     General: Skin is warm and dry.   Neurological:      Mental Status: He is alert and oriented to person, place, and time.      Deep Tendon Reflexes: Reflexes are normal and symmetric.   Psychiatric:         Behavior: Behavior normal.         Thought Content: Thought content normal.         Judgment: Judgment normal.       Vitals:    10/14/24 1105   BP: 128/86   BP Location: Left arm   Patient Position: Sitting   Cuff Size: Adult   Pulse: 82   Resp: 16   Temp: 99.1 °F (37.3 °C)   TempSrc: Temporal   SpO2: 98%   Weight: 72.7 kg (160 lb 3.2 oz)   Height: 188 cm (74.02\")         Assessment & Plan   Diagnoses and all orders for this visit:    1. Essential hypertension (Primary)    2. Dyslipidemia    3. Stenosis of left carotid artery    4. Paroxysmal supraventricular tachycardia    5. 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 Need for Tdap vaccination  -     Tdap Vaccine Greater Than or Equal To 8yo IM      Labs reviewed from July.  No need for additional labs  He will restart Lipitor @ 10 mg daily  Tdap updated  Return in about 6 months (around 4/14/2025) for Annual.

## 2024-10-15 DIAGNOSIS — J18.9 PNEUMONIA OF LEFT LOWER LOBE DUE TO INFECTIOUS ORGANISM: ICD-10-CM

## 2024-10-17 ENCOUNTER — OFFICE VISIT (OUTPATIENT)
Dept: PULMONOLOGY | Facility: CLINIC | Age: 69
End: 2024-10-17
Payer: COMMERCIAL

## 2024-10-17 VITALS
SYSTOLIC BLOOD PRESSURE: 136 MMHG | OXYGEN SATURATION: 98 % | WEIGHT: 162.31 LBS | TEMPERATURE: 99.1 F | HEIGHT: 74 IN | DIASTOLIC BLOOD PRESSURE: 96 MMHG | HEART RATE: 72 BPM | BODY MASS INDEX: 20.83 KG/M2 | RESPIRATION RATE: 16 BRPM

## 2024-10-17 DIAGNOSIS — T17.908A CHRONIC PULMONARY ASPIRATION, INITIAL ENCOUNTER: ICD-10-CM

## 2024-10-17 DIAGNOSIS — K21.9 GASTROESOPHAGEAL REFLUX DISEASE, UNSPECIFIED WHETHER ESOPHAGITIS PRESENT: ICD-10-CM

## 2024-10-17 DIAGNOSIS — C14.0 THROAT CANCER: Primary | ICD-10-CM

## 2024-10-17 NOTE — PROGRESS NOTES
"Follow Up Office Note       Patient Name: Boone Riley    Referring Physician: No ref. provider found    Chief Complaint:    Chief Complaint   Patient presents with    Post CT     F/u    Cough       History of Present Illness: Boone Riley is a 69 y.o. male who is here today to follow-up care with Pulmonary.   Patient has a past medical significant for head neck cancer, GERD, and hypothyroidism.  Patient currently doing well no pulmonary complaints.    Review of Systems:   Review of Systems   Constitutional:  Negative for chills, fatigue and fever.   HENT:  Negative for congestion and voice change.    Eyes:  Negative for blurred vision.   Respiratory:  Negative for cough, shortness of breath and wheezing.    Cardiovascular:  Negative for chest pain.   Skin:  Negative for dry skin.   Hematological:  Negative for adenopathy.   Psychiatric/Behavioral:  Negative for agitation and depressed mood.        The following portions of the patient's history were reviewed and updated as appropriate: allergies, current medications, past family history, past medical history, past social history, past surgical history and problem list.    Physical Exam:  Vital Signs:   Vitals:    10/17/24 1446   BP: 136/96   Pulse: 72   Resp: 16   Temp: 99.1 °F (37.3 °C)   SpO2: 98%  Comment: room air at resting   Weight: 73.6 kg (162 lb 5 oz)   Height: 188 cm (74.02\")       Physical Exam  Vitals and nursing note reviewed.   Constitutional:       General: He is not in acute distress.     Appearance: He is well-developed and normal weight. He is not ill-appearing or toxic-appearing.   Cardiovascular:      Rate and Rhythm: Normal rate and regular rhythm.      Pulses: Normal pulses.      Heart sounds: Normal heart sounds. No murmur heard.     No gallop.   Pulmonary:      Effort: Pulmonary effort is normal.      Breath sounds: Normal breath sounds. No wheezing, rhonchi or rales.   Musculoskeletal:      Right lower leg: No edema.      Left " lower leg: No edema.   Neurological:      Mental Status: He is alert.         Immunization History   Administered Date(s) Administered    COVID-19 (MODERNA) 1st,2nd,3rd Dose Monovalent 12/23/2020, 01/22/2021    Hepatitis A 02/05/2019, 08/16/2019    Influenza Seasonal Injectable 10/08/2024    Influenza, Unspecified 10/01/2015, 09/01/2018, 10/01/2020, 10/01/2022    Pneumococcal Conjugate 20-Valent (PCV20) 03/20/2023    Pneumococcal Polysaccharide (PPSV23) 02/27/2020    Shingrix 06/01/2022, 08/01/2022    Tdap 07/21/2014, 10/14/2024       Results Review:   -I personally viewed the patient's CT scan report from October 2024, which showed resolution of the groundglass infiltrate in the left lower lobe no signs of any metastatic disease.  - CT scan of the chest from July 2024, showed fibrotic apical changes centrally bilaterally with some bronchiectasis associated with him, and then there is a left lower lobe groundglass infiltrate more consistent with pneumonia.  Also reviewed the PET scan from 4/9/2024 in which the upper lobe scarring has been present and was PET negative, the CT scan of the neck from 2009 showed that there was no scarring in the upper lobes at that time.  - PFT from 7/17/2024 shows no obstruction or restriction with a normal DLCO.  -I personally viewed the patient's swallow evaluation from August 27, 2024, which does show aspiration with swallowing.    Assessment / Plan:   Diagnoses and all orders for this visit:    1. Throat cancer (Primary)  2. Chronic pulmonary aspiration, initial encounter  3. Gastroesophageal reflux disease, unspecified whether esophagitis present  -Patient has aspiration with swallowing.  CT scan showed that the left lower lobe infiltrate had resolved.  His signs and symptoms are all consistent with somewhat he was having aspiration events and either getting an aspiration pneumonitis or pneumonia.  I encouraged him to see the voice clinic at the [facility] to see if  they could do any help with his swallowing or potentially procedures from Dr. Henry and ENT.  He is agreeable.  I made the referral over to them.  Otherwise continue with precautions as far as avoiding foods cause more problems, elevating the head of the bed and then no eating and drinking 2 hours prior to going to sleep.  Also want to continue on the Protonix 40 mg twice daily 30 minutes prior to eating empty stomach.  No further imaging is required.  No further pulmonary follow-up required at this time.      Follow Up:   Return if symptoms worsen or fail to improve.       BOOGIE Balbuena, DO  Pulmonary and Critical Care Medicine  Note Electronically Signed    Part of this note may be an electronic transcription/translation of spoken language to printed text using the Dragon Dictation System.

## 2024-10-21 DIAGNOSIS — I10 BENIGN ESSENTIAL HYPERTENSION: ICD-10-CM

## 2024-10-22 RX ORDER — NIFEDIPINE 30 MG/1
30 TABLET, EXTENDED RELEASE ORAL DAILY
Qty: 90 TABLET | Refills: 0 | Status: SHIPPED | OUTPATIENT
Start: 2024-10-22

## 2024-10-22 NOTE — TELEPHONE ENCOUNTER
Rx Refill Note  Requested Prescriptions     Pending Prescriptions Disp Refills    NIFEdipine XL (PROCARDIA XL) 30 MG 24 hr tablet [Pharmacy Med Name: NIFEdipine ER Osmotic Release Oral Tablet Extended Release 24 Hour 30 MG] 90 tablet 0     Sig: TAKE 1 TABLET BY MOUTH EVERY DAY      Last office visit with prescribing clinician: 10/14/2024   Last telemedicine visit with prescribing clinician: Visit date not found   Next office visit with prescribing clinician: 1/2/2025     Ruma Pena MA  10/22/24, 07:52 EDT

## 2024-10-23 DIAGNOSIS — E78.2 MIXED HYPERLIPIDEMIA: ICD-10-CM

## 2024-10-24 RX ORDER — ATORVASTATIN CALCIUM 20 MG/1
20 TABLET, FILM COATED ORAL DAILY
Qty: 90 TABLET | Refills: 1 | Status: SHIPPED | OUTPATIENT
Start: 2024-10-24

## 2024-10-24 NOTE — TELEPHONE ENCOUNTER
Rx Refill Note  Requested Prescriptions     Pending Prescriptions Disp Refills    atorvastatin (LIPITOR) 20 MG tablet [Pharmacy Med Name: ATORVASTATIN 20MG TABLETS] 90 tablet 1     Sig: TAKE 1 TABLET BY MOUTH DAILY      Last office visit with prescribing clinician: 10/14/2024   Last telemedicine visit with prescribing clinician: Visit date not found   Next office visit with prescribing clinician: 1/2/2025       Ruma Pena MA  10/24/24, 07:49 EDT

## 2024-11-03 DIAGNOSIS — K21.9 GASTROESOPHAGEAL REFLUX DISEASE, UNSPECIFIED WHETHER ESOPHAGITIS PRESENT: ICD-10-CM

## 2024-11-04 RX ORDER — PANTOPRAZOLE SODIUM 40 MG/1
40 TABLET, DELAYED RELEASE ORAL DAILY
Qty: 90 TABLET | Refills: 1 | Status: SHIPPED | OUTPATIENT
Start: 2024-11-04

## 2024-11-04 NOTE — TELEPHONE ENCOUNTER
Rx Refill Note  Requested Prescriptions     Pending Prescriptions Disp Refills    pantoprazole (PROTONIX) 40 MG EC tablet [Pharmacy Med Name: PANTOPRAZOLE 40MG TABLETS] 90 tablet 1     Sig: TAKE 1 TABLET BY MOUTH DAILY      Last office visit with prescribing clinician: 10/14/2024   Last telemedicine visit with prescribing clinician: Visit date not found   Next office visit with prescribing clinician: 1/2/2025     Ruma Pena MA  11/04/24, 08:07 EST

## 2024-11-06 DIAGNOSIS — I44.0 1ST DEGREE AV BLOCK: ICD-10-CM

## 2024-11-06 DIAGNOSIS — R00.2 PALPITATIONS: ICD-10-CM

## 2024-11-06 DIAGNOSIS — E03.9 ACQUIRED HYPOTHYROIDISM: ICD-10-CM

## 2024-11-06 DIAGNOSIS — K21.9 GASTROESOPHAGEAL REFLUX DISEASE, UNSPECIFIED WHETHER ESOPHAGITIS PRESENT: ICD-10-CM

## 2024-11-07 RX ORDER — LEVOTHYROXINE SODIUM 100 MCG
100 TABLET ORAL DAILY
Qty: 30 TABLET | Refills: 2 | Status: SHIPPED | OUTPATIENT
Start: 2024-11-07

## 2024-11-07 NOTE — TELEPHONE ENCOUNTER
Rx Refill Note  Requested Prescriptions     Pending Prescriptions Disp Refills    Synthroid 100 MCG tablet [Pharmacy Med Name: SYNTHROID 0.1MG (100MCG)TABLETS] 30 tablet 2     Sig: TAKE 1 TABLET BY MOUTH DAILY      Last office visit with prescribing clinician: 10/14/2024   Last telemedicine visit with prescribing clinician: Visit date not found   Next office visit with prescribing clinician: 1/2/2025       Ruma Pena MA  11/07/24, 07:54 EST

## 2024-11-13 ENCOUNTER — HOSPITAL ENCOUNTER (OUTPATIENT)
Dept: CARDIOLOGY | Facility: HOSPITAL | Age: 69
Discharge: HOME | End: 2024-11-13
Admitting: RADIOLOGY
Payer: COMMERCIAL

## 2024-11-13 ENCOUNTER — OFFICE VISIT (OUTPATIENT)
Dept: NEUROSURGERY | Facility: CLINIC | Age: 69
End: 2024-11-13
Payer: COMMERCIAL

## 2024-11-13 VITALS
HEIGHT: 74 IN | BODY MASS INDEX: 20.69 KG/M2 | TEMPERATURE: 98.4 F | OXYGEN SATURATION: 99 % | WEIGHT: 161.2 LBS | HEART RATE: 67 BPM | SYSTOLIC BLOOD PRESSURE: 140 MMHG | DIASTOLIC BLOOD PRESSURE: 100 MMHG

## 2024-11-13 VITALS — BODY MASS INDEX: 20.82 KG/M2 | HEIGHT: 74 IN | WEIGHT: 162.26 LBS

## 2024-11-13 DIAGNOSIS — I65.22 CAROTID STENOSIS, ASYMPTOMATIC, LEFT: ICD-10-CM

## 2024-11-13 DIAGNOSIS — I65.22 CAROTID STENOSIS, ASYMPTOMATIC, LEFT: Primary | ICD-10-CM

## 2024-11-13 LAB
BH CV LEFT PROX CCA HIDDEN LRR: 1
BH CV VAS CAROTID LEFT DISTAL STENT EDV: 26.2 CM/S
BH CV VAS CAROTID LEFT DISTAL STENT: 71 CM/S
BH CV VAS CAROTID LEFT DISTAL TO STENT EDV: 29.4 CM/S
BH CV VAS CAROTID LEFT DISTAL TO STENT: 81.3 CM/S
BH CV VAS CAROTID LEFT MID STENT EDV: 30.5 CM/S
BH CV VAS CAROTID LEFT MID STENT: 87.7 CM/S
BH CV VAS CAROTID LEFT PROXIMAL STENT EDV: 31.7 CM/S
BH CV VAS CAROTID LEFT PROXIMAL STENT: 86.9 CM/S
BH CV VAS CAROTID LEFT PROXIMAL TO STENT: 70.6 CM/S
BH CV VAS CAROTID LEFT STENT NATIVE VESSEL PROXIMAL ED: 25 CM/S
BH CV XLRA MEAS LEFT DIST CCA EDV: 25.8 CM/SEC
BH CV XLRA MEAS LEFT DIST CCA PSV: 79.7 CM/SEC
BH CV XLRA MEAS LEFT DIST ICA EDV: 41.3 CM/SEC
BH CV XLRA MEAS LEFT DIST ICA PSV: 112.2 CM/SEC
BH CV XLRA MEAS LEFT ICA/CCA RATIO: 1.22
BH CV XLRA MEAS LEFT MID CCA EDV: 36.9 CM/SEC
BH CV XLRA MEAS LEFT MID CCA PSV: 100.4 CM/SEC
BH CV XLRA MEAS LEFT MID ICA EDV: 46.5 CM/SEC
BH CV XLRA MEAS LEFT MID ICA PSV: 122.2 CM/SEC
BH CV XLRA MEAS LEFT PROX CCA EDV: 21 CM/SEC
BH CV XLRA MEAS LEFT PROX CCA PSV: 69.8 CM/SEC
BH CV XLRA MEAS LEFT PROX ECA PSV: 152.3 CM/SEC
BH CV XLRA MEAS LEFT PROX ICA EDV: 38.5 CM/SEC
BH CV XLRA MEAS LEFT PROX ICA PSV: 94.3 CM/SEC
BH CV XLRA MEAS LEFT PROX SCLA PSV: 100.6 CM/SEC
BH CV XLRA MEAS LEFT VERTEBRAL A PSV: 0 CM/SEC
BH CV XLRA MEAS RIGHT DIST CCA EDV: 32.9 CM/SEC
BH CV XLRA MEAS RIGHT DIST CCA PSV: 91.7 CM/SEC
BH CV XLRA MEAS RIGHT DIST ICA EDV: 27.3 CM/SEC
BH CV XLRA MEAS RIGHT DIST ICA PSV: 71.8 CM/SEC
BH CV XLRA MEAS RIGHT ICA/CCA RATIO: 1.22
BH CV XLRA MEAS RIGHT MID CCA EDV: 24.9 CM/SEC
BH CV XLRA MEAS RIGHT MID CCA PSV: 73.4 CM/SEC
BH CV XLRA MEAS RIGHT MID ICA EDV: 35.5 CM/SEC
BH CV XLRA MEAS RIGHT MID ICA PSV: 87.4 CM/SEC
BH CV XLRA MEAS RIGHT PROX CCA EDV: 31.4 CM/SEC
BH CV XLRA MEAS RIGHT PROX CCA PSV: 102.8 CM/SEC
BH CV XLRA MEAS RIGHT PROX ECA PSV: 138.8 CM/SEC
BH CV XLRA MEAS RIGHT PROX ICA EDV: 35.9 CM/SEC
BH CV XLRA MEAS RIGHT PROX ICA PSV: 89.6 CM/SEC
BH CV XLRA MEAS RIGHT PROX SCLA PSV: 151.7 CM/SEC
BH CV XLRA MEAS RIGHT VERTEBRAL A EDV: 22.6 CM/SEC
BH CV XLRA MEAS RIGHT VERTEBRAL A PSV: 57.7 CM/SEC
LEFT ARM BP: NORMAL MMHG
RIGHT ARM BP: NORMAL MMHG

## 2024-11-13 PROCEDURE — 93880 EXTRACRANIAL BILAT STUDY: CPT

## 2024-11-13 PROCEDURE — 99213 OFFICE O/P EST LOW 20 MIN: CPT | Performed by: RADIOLOGY

## 2024-11-13 NOTE — PROGRESS NOTES
"NAME: LOLITA LANDAVERDE   DOS: 2024  : 1955  PCP: Yesy Kearney APRN    Chief Complaint:    Chief Complaint   Patient presents with    Follow-up     Carotid stenosis, asymptomatic, left           History of Present Illness:  69 y.o. male who is well-known to the neurointerventional service, having undergone prior angioplasty/stent placement for asymptomatic, high-grade left common carotid stenosis on 2024.  Mr. Landaverde has a history of head neck cancer, status post left modified radical neck dissection, along with radiation therapy over 10 years ago, and thus was deemed a \"high surgical risk\" for endarterectomy.     Mr. Landaverde continues to do well, denying any stroke or TIA-only symptoms.  He is currently on an aspirin/statin regimen, and tolerating this quite well.  He presents today for routine follow-up.    Past Medical History:  Past Medical History:   Diagnosis Date    Allergic     Seasonal    Carotid artery occlusion 3/28/2024    CTA report, same date    Cervical disc disorder Seceral years    Noted in CTA report 3/28/2024    Diverticulosis of small intestine without hemorrhage     Dyslipidemia 2024    Erectile dysfunction 2018    Esophageal reflux     Essential and other specified forms of tremor     General medical exam     HL (hearing loss) 2018    Began with chemo in , not severe    Hypertension     Hypertrophy (benign) of prostate     Hypothyroidism     Paroxysmal supraventricular tachycardia     Peripheral neuropathy Seversl years    Left hand, thumb snd index finger numb    Screening PSA (prostate specific antigen)     per Urology    Sleep disorder     Spinal stenosis of cervical region     Throat cancer 2008    base of tongue    Tremor 2010    Diagnosed essential tremor       Past Surgical History:  Past Surgical History:   Procedure Laterality Date    CARDIAC ABLATION   or ?    CAROTID STENT Left 2024    Procedure: Carotid Stent;  Surgeon: Crio Ballesteros " MD ERIK;  Location: [facility] CATH INVASIVE LOCATION;  Service: Interventional Radiology;  Laterality: Left;    COLONOSCOPY  2008 due 2018    CORONARY STENT PLACEMENT  4/18/2024    Left corotid, Dr Ballesteros, [facility]    ENDOMETRIAL ABLATION      ENDOSCOPY  01/2015    Dr. Gallo- [facility]    NECK DISSECTION      Dr. CAMACHO    PROSTATE SURGERY  07/20/2022    Urolift       Review of Systems:        Review of Systems   Constitutional:  Negative for activity change, appetite change, chills, diaphoresis, fatigue, fever and unexpected weight change.   HENT:  Negative for congestion, dental problem, drooling, ear discharge, ear pain, facial swelling, hearing loss, mouth sores, nosebleeds, postnasal drip, rhinorrhea, sinus pressure, sinus pain, sneezing, sore throat, tinnitus, trouble swallowing and voice change.    Eyes:  Negative for photophobia, pain, discharge, redness, itching and visual disturbance.   Respiratory:  Negative for apnea, cough, choking, chest tightness, shortness of breath, wheezing and stridor.    Cardiovascular:  Negative for chest pain, palpitations and leg swelling.   Gastrointestinal:  Negative for abdominal distention, abdominal pain, anal bleeding, blood in stool, constipation, diarrhea, nausea, rectal pain and vomiting.   Endocrine: Negative for cold intolerance, heat intolerance, polydipsia, polyphagia and polyuria.   Genitourinary:  Negative for decreased urine volume, difficulty urinating, dysuria, enuresis, flank pain, frequency, genital sores, hematuria and urgency.   Musculoskeletal:  Negative for arthralgias, back pain, gait problem, joint swelling, myalgias, neck pain and neck stiffness.   Skin:  Negative for color change, pallor, rash and wound.   Allergic/Immunologic: Negative for environmental allergies, food allergies and immunocompromised state.   Neurological:  Negative for dizziness, tremors, seizures, syncope, facial asymmetry, speech difficulty, weakness, light-headedness, numbness and  headaches.   Hematological:  Negative for adenopathy. Does not bruise/bleed easily.   Psychiatric/Behavioral:  Negative for agitation, behavioral problems, confusion, decreased concentration, dysphoric mood, hallucinations, self-injury, sleep disturbance and suicidal ideas. The patient is not nervous/anxious and is not hyperactive.    All other systems reviewed and are negative.       Medications    Current Outpatient Medications:     aspirin 81 MG chewable tablet, Chew 1 tablet Daily., Disp: 30 tablet, Rfl: 5    atorvastatin (LIPITOR) 20 MG tablet, TAKE 1 TABLET BY MOUTH DAILY, Disp: 90 tablet, Rfl: 1    Cholecalciferol (Vitamin D3) 1.25 MG (18848 UT) capsule, Take 1 capsule by mouth., Disp: , Rfl:     NIFEdipine XL (PROCARDIA XL) 30 MG 24 hr tablet, TAKE 1 TABLET BY MOUTH EVERY DAY, Disp: 90 tablet, Rfl: 0    pantoprazole (PROTONIX) 40 MG EC tablet, TAKE 1 TABLET BY MOUTH DAILY, Disp: 90 tablet, Rfl: 1    sildenafil (REVATIO) 20 MG tablet, 2-5 PO as needed for sexual activity, Disp: 50 tablet, Rfl: 2    Synthroid 100 MCG tablet, TAKE 1 TABLET BY MOUTH DAILY, Disp: 30 tablet, Rfl: 2    Allergies:  No Known Allergies    Social Hx:  Social History     Tobacco Use    Smoking status: Never     Passive exposure: Never    Smokeless tobacco: Never   Vaping Use    Vaping status: Never Used   Substance Use Topics    Alcohol use: No    Drug use: No       Family Hx:  Family History   Problem Relation Age of Onset    Breast cancer Mother     Cancer Mother         Breast Cancer    Early death Mother         Due to untreated breast cancer, age 53    Lung cancer Father     Alcohol abuse Father     Cancer Father         Lung cancer, lifetime smoker    Diabetes Brother         Type 2 onset approx age 55       Review of Imaging:  Carotid duplex dated 11/13/2024 from [facility] was reviewed along with its corresponding radiologic report.  Comparison is made to prior study dated 4/18/2024.  The left common carotid stent  "remains widely patent, without recurrent stenosis or complicating feature.  There is no hemodynamically significant stenosis involving the left carotid vasculature, with peak velocities of 122/47 cm/s, with and the ICA/CCA ratio of 1.2 (was 106/30 cm/s, ratio 0.8).  Heterogeneous plaque involving the right carotid vasculature is stable, without hemodynamically significant stenosis.  Peak velocities within the right carotid vasculature are 90/36 cm/s, with a ratio of 1.2 (was 113/36 cm/s, ratio 1.0).  There is robust flow within the right vertebral artery.  Chronic occlusion of left vertebral artery is again noted.    Physical Examination:  Vitals:    11/13/24 1211   BP: 140/100   Pulse: 67   Temp: 98.4 °F (36.9 °C)   SpO2: 99%        General Appearance:   Well developed, well nourished, well groomed, alert, and cooperative.    Neurological examination:  Nonfocal neurologic exam.  Speech is clear.  Ambulates unassisted.    Diagnoses/Plan:    Mr. Riley is a 69 y.o. male status post angioplasty/stent placement for asymptomatic, high-grade left common carotid artery stenosis on 4/18/2024.  Mr. Riley does have a history of head neck cancer, status post surgery and radiation therapy, and thus was a \"high surgical risk\" for endarterectomy.  He continues to do well, denying any new stroke or TIA-like symptoms.  Carotid duplex on 11/13/2024 demonstrates widely patent left carotid stent, with no complicating features.  He remains on an aspirin/statin regimen, and is tolerating this quite well.  I plan on seeing him back in 12 months time with carotid duplex, to ensure stability and exclude any recurrent/progression of disease that might necessitate further treatment/intervention.  During the interim, he will contact our office in/or call 911 if he experiences any stroke or TIA-like symptoms.     Copied text and portions of the note have been reviewed and are accurate as of 11/13/2024.                     "

## 2025-01-02 ENCOUNTER — LAB (OUTPATIENT)
Dept: INTERNAL MEDICINE | Facility: CLINIC | Age: 70
End: 2025-01-02
Payer: COMMERCIAL

## 2025-01-02 ENCOUNTER — OFFICE VISIT (OUTPATIENT)
Dept: INTERNAL MEDICINE | Facility: CLINIC | Age: 70
End: 2025-01-02
Payer: COMMERCIAL

## 2025-01-02 VITALS
DIASTOLIC BLOOD PRESSURE: 72 MMHG | OXYGEN SATURATION: 99 % | WEIGHT: 157.2 LBS | RESPIRATION RATE: 16 BRPM | TEMPERATURE: 99.1 F | SYSTOLIC BLOOD PRESSURE: 120 MMHG | HEART RATE: 80 BPM | BODY MASS INDEX: 20.17 KG/M2 | HEIGHT: 74 IN

## 2025-01-02 DIAGNOSIS — I10 ESSENTIAL HYPERTENSION: ICD-10-CM

## 2025-01-02 DIAGNOSIS — I65.22 STENOSIS OF LEFT CAROTID ARTERY: ICD-10-CM

## 2025-01-02 DIAGNOSIS — E78.5 DYSLIPIDEMIA: ICD-10-CM

## 2025-01-02 DIAGNOSIS — E03.9 ACQUIRED HYPOTHYROIDISM: ICD-10-CM

## 2025-01-02 DIAGNOSIS — I10 ESSENTIAL HYPERTENSION: Primary | ICD-10-CM

## 2025-01-02 DIAGNOSIS — Z85.819 HISTORY OF MOUTH CANCER: ICD-10-CM

## 2025-01-02 DIAGNOSIS — E55.9 VITAMIN D DEFICIENCY: ICD-10-CM

## 2025-01-02 DIAGNOSIS — K21.9 GASTROESOPHAGEAL REFLUX DISEASE, UNSPECIFIED WHETHER ESOPHAGITIS PRESENT: ICD-10-CM

## 2025-01-02 LAB
25(OH)D3 SERPL-MCNC: 41.2 NG/ML (ref 30–100)
ALBUMIN SERPL-MCNC: 4.7 G/DL (ref 3.5–5.2)
ALBUMIN/GLOB SERPL: 1.7 G/DL
ALP SERPL-CCNC: 72 U/L (ref 39–117)
ALT SERPL W P-5'-P-CCNC: 12 U/L (ref 1–41)
ANION GAP SERPL CALCULATED.3IONS-SCNC: 14.8 MMOL/L (ref 5–15)
AST SERPL-CCNC: 22 U/L (ref 1–40)
BASOPHILS # BLD AUTO: 0.11 10*3/MM3 (ref 0–0.2)
BASOPHILS NFR BLD AUTO: 1 % (ref 0–1.5)
BILIRUB SERPL-MCNC: 0.8 MG/DL (ref 0–1.2)
BUN SERPL-MCNC: 21 MG/DL (ref 8–23)
BUN/CREAT SERPL: 13.8 (ref 7–25)
CALCIUM SPEC-SCNC: 9.7 MG/DL (ref 8.6–10.5)
CHLORIDE SERPL-SCNC: 100 MMOL/L (ref 98–107)
CHOLEST SERPL-MCNC: 148 MG/DL (ref 0–200)
CO2 SERPL-SCNC: 27.2 MMOL/L (ref 22–29)
CREAT SERPL-MCNC: 1.52 MG/DL (ref 0.76–1.27)
DEPRECATED RDW RBC AUTO: 40.9 FL (ref 37–54)
EGFRCR SERPLBLD CKD-EPI 2021: 49.9 ML/MIN/1.73
EOSINOPHIL # BLD AUTO: 0.18 10*3/MM3 (ref 0–0.4)
EOSINOPHIL NFR BLD AUTO: 1.7 % (ref 0.3–6.2)
ERYTHROCYTE [DISTWIDTH] IN BLOOD BY AUTOMATED COUNT: 13.1 % (ref 12.3–15.4)
GLOBULIN UR ELPH-MCNC: 2.7 GM/DL
GLUCOSE SERPL-MCNC: 92 MG/DL (ref 65–99)
HCT VFR BLD AUTO: 49.4 % (ref 37.5–51)
HDLC SERPL-MCNC: 65 MG/DL (ref 40–60)
HGB BLD-MCNC: 15.9 G/DL (ref 13–17.7)
IMM GRANULOCYTES # BLD AUTO: 0.05 10*3/MM3 (ref 0–0.05)
IMM GRANULOCYTES NFR BLD AUTO: 0.5 % (ref 0–0.5)
LDLC SERPL CALC-MCNC: 69 MG/DL (ref 0–100)
LDLC/HDLC SERPL: 1.06 {RATIO}
LYMPHOCYTES # BLD AUTO: 1.62 10*3/MM3 (ref 0.7–3.1)
LYMPHOCYTES NFR BLD AUTO: 15.4 % (ref 19.6–45.3)
MCH RBC QN AUTO: 27.7 PG (ref 26.6–33)
MCHC RBC AUTO-ENTMCNC: 32.2 G/DL (ref 31.5–35.7)
MCV RBC AUTO: 86.2 FL (ref 79–97)
MONOCYTES # BLD AUTO: 1.15 10*3/MM3 (ref 0.1–0.9)
MONOCYTES NFR BLD AUTO: 10.9 % (ref 5–12)
NEUTROPHILS NFR BLD AUTO: 7.4 10*3/MM3 (ref 1.7–7)
NEUTROPHILS NFR BLD AUTO: 70.5 % (ref 42.7–76)
NRBC BLD AUTO-RTO: 0 /100 WBC (ref 0–0.2)
PLATELET # BLD AUTO: 218 10*3/MM3 (ref 140–450)
PMV BLD AUTO: 11.1 FL (ref 6–12)
POTASSIUM SERPL-SCNC: 4.3 MMOL/L (ref 3.5–5.2)
PROT SERPL-MCNC: 7.4 G/DL (ref 6–8.5)
RBC # BLD AUTO: 5.73 10*6/MM3 (ref 4.14–5.8)
SODIUM SERPL-SCNC: 142 MMOL/L (ref 136–145)
TRIGL SERPL-MCNC: 72 MG/DL (ref 0–150)
TSH SERPL DL<=0.05 MIU/L-ACNC: 2.75 UIU/ML (ref 0.27–4.2)
VIT B12 BLD-MCNC: 451 PG/ML (ref 211–946)
VLDLC SERPL-MCNC: 14 MG/DL (ref 5–40)
WBC NRBC COR # BLD AUTO: 10.51 10*3/MM3 (ref 3.4–10.8)

## 2025-01-02 PROCEDURE — 36415 COLL VENOUS BLD VENIPUNCTURE: CPT | Performed by: NURSE PRACTITIONER

## 2025-01-02 PROCEDURE — 80061 LIPID PANEL: CPT | Performed by: NURSE PRACTITIONER

## 2025-01-02 PROCEDURE — 80050 GENERAL HEALTH PANEL: CPT | Performed by: NURSE PRACTITIONER

## 2025-01-02 PROCEDURE — 99214 OFFICE O/P EST MOD 30 MIN: CPT | Performed by: NURSE PRACTITIONER

## 2025-01-02 PROCEDURE — 82607 VITAMIN B-12: CPT | Performed by: NURSE PRACTITIONER

## 2025-01-02 PROCEDURE — 82306 VITAMIN D 25 HYDROXY: CPT | Performed by: NURSE PRACTITIONER

## 2025-01-02 NOTE — PROGRESS NOTES
Subjective   Boone Riley is a 69 y.o. male    Chief Complaint   Patient presents with    Follow-up     6 month follow up.   Patient states everything seems to be okay.   Muscles in his neck are still really stiff, and no changes to anything.     Hypertension     History of Present Illness     Frederick works at [employer]. They were running tests at work and did a carotid duplex on him that led to a CTA of the neck which revealed 80 % stenosis of the left carotid. He was seen Dr. Ballesteros, underwent an angiogram, that led to an angioplasty/stent placement for asymptomatic, high-grade left common carotid stenosis on 4/18/2024.  Reports that he is feeling well since surgery.  No complications.       Had PET in relation to this as well. Groundglass was noted in lung. Was referred to Pulm and had f/u CT.  Was released from their care this fall.  They suggested seeing Dr. Henry at  [facility] to see if they could help with his swallowing.  He has not pursued this since he follows with Dr. CAMACHO.     Palps - during his preop work up, he was noted to [id number] degree AV block and RSR pattern in V1, PACs.  At postop follow-up, he did complain of palpitations.  Has worn a Holter since.  Did have some runs of SVT.  Was referred to cardiology and was seen on 7/8/2024 - no med changes     GERD-PPI was changed from omeprazole to pantoprazole at postop visit due to interaction with omeprazole and Plavix.  At his follow-up with Dr. Ballesteros, Plavix was stopped.  He will be on aspirin indefinitely.  States that he is tolerating the pantoprazole well.  No breakthroughs of GERD.     HL-was started on Lipitor 20 mg at last visit.  He has been taking 10 mg daily.  Lab Results   Component Value Date    CHOL 113 07/05/2024    TRIG 40 07/05/2024    HDL 56 07/05/2024    LDL 46 07/05/2024     HTN-chronic and stable on nifedipine XL 30 mg 1 p.o. daily.  Blood pressure within acceptable limits today.     Hypothyroid-was changed from generic to brand Synthroid at  last visit to see if this would make a difference in his palpitations.  Unsure if the change to brand Synthroid has had much effect, but his palpitations are improved.     BPH - Seeing Urology ([facility], now [facility]).  Takes PRN meds for ED.  Was given 20mg Sildenafil that he takes PRN/rarely. He did have a Urolift 7/20/2022. He has been able to d/c the flomax       H/O throat (base of tongue) cancer.  Dx'd in 2008.  Followed by Dr. CAMACHO.  All recent reports have been good. Seeing him on a yearly basis now.      Vit D and B12 def - chronic/levels are low.  Not currently taking any supplements     Flu shot - 10/2024 @ work  COVID - 12/23/20, 1/22/21, declines booster  Tdap - 10/2024  Shingrix - 6/2022 and 8/2022  PSA - 4/2024  colon - 10/1/2018  Hep A - 2/2019, 8/2019    The following portions of the patient's history were reviewed and updated as appropriate: allergies, current medications, past family history, past medical history, past social history, past surgical history, and problem list.    Current Outpatient Medications:     aspirin 81 MG chewable tablet, Chew 1 tablet Daily., Disp: 30 tablet, Rfl: 5    atorvastatin (LIPITOR) 20 MG tablet, TAKE 1 TABLET BY MOUTH DAILY, Disp: 90 tablet, Rfl: 1    Cholecalciferol (Vitamin D3) 1.25 MG (99157 UT) capsule, Take 1 capsule by mouth., Disp: , Rfl:     NIFEdipine XL (PROCARDIA XL) 30 MG 24 hr tablet, TAKE 1 TABLET BY MOUTH EVERY DAY, Disp: 90 tablet, Rfl: 0    pantoprazole (PROTONIX) 40 MG EC tablet, TAKE 1 TABLET BY MOUTH DAILY, Disp: 90 tablet, Rfl: 1    sildenafil (REVATIO) 20 MG tablet, 2-5 PO as needed for sexual activity, Disp: 50 tablet, Rfl: 2    Synthroid 100 MCG tablet, TAKE 1 TABLET BY MOUTH DAILY, Disp: 30 tablet, Rfl: 2     Review of Systems   Constitutional:  Negative for chills, fatigue and fever.   Respiratory:  Negative for cough, chest tightness and shortness of breath.    Cardiovascular:  Negative for chest pain.   Gastrointestinal:  Negative for  "abdominal pain, diarrhea, nausea and vomiting.   Endocrine: Negative for cold intolerance and heat intolerance.   Musculoskeletal:  Negative for arthralgias.   Neurological:  Negative for dizziness.       Objective   Physical Exam  Constitutional:       Appearance: He is well-developed.   HENT:      Head: Normocephalic and atraumatic.   Eyes:      Conjunctiva/sclera: Conjunctivae normal.      Pupils: Pupils are equal, round, and reactive to light.   Cardiovascular:      Rate and Rhythm: Normal rate and regular rhythm.      Heart sounds: Normal heart sounds.   Pulmonary:      Effort: Pulmonary effort is normal.      Breath sounds: Normal breath sounds.   Abdominal:      General: Bowel sounds are normal.      Palpations: Abdomen is soft.   Musculoskeletal:         General: Normal range of motion.      Cervical back: Normal range of motion.   Skin:     General: Skin is warm and dry.   Neurological:      Mental Status: He is alert and oriented to person, place, and time.      Deep Tendon Reflexes: Reflexes are normal and symmetric.   Psychiatric:         Behavior: Behavior normal.         Thought Content: Thought content normal.         Judgment: Judgment normal.       Vitals:    01/02/25 1307   BP: 120/72   BP Location: Right arm   Patient Position: Sitting   Cuff Size: Adult   Pulse: 80   Resp: 16   Temp: 99.1 °F (37.3 °C)   TempSrc: Temporal   SpO2: 99%   Weight: 71.3 kg (157 lb 3.2 oz)   Height: 188 cm (74.02\")         Assessment & Plan   Diagnoses and all orders for this visit:    1. Essential hypertension (Primary)  -     CBC & Differential; Future  -     Comprehensive Metabolic Panel; Future  -     Lipid Panel; Future  -     TSH; Future  -     Vitamin B12; Future  -     Vitamin D,25-Hydroxy; Future    2. Dyslipidemia  -     CBC & Differential; Future  -     Comprehensive Metabolic Panel; Future  -     Lipid Panel; Future  -     TSH; Future  -     Vitamin B12; Future  -     Vitamin D,25-Hydroxy; Future    3. " Stenosis of left carotid artery  -     CBC & Differential; Future  -     Comprehensive Metabolic Panel; Future  -     Lipid Panel; Future  -     TSH; Future  -     Vitamin B12; Future  -     Vitamin D,25-Hydroxy; Future    4. Acquired hypothyroidism  -     CBC & Differential; Future  -     Comprehensive Metabolic Panel; Future  -     Lipid Panel; Future  -     TSH; Future  -     Vitamin B12; Future  -     Vitamin D,25-Hydroxy; Future    5. Gastroesophageal reflux disease, unspecified whether esophagitis present  -     CBC & Differential; Future  -     Comprehensive Metabolic Panel; Future  -     Lipid Panel; Future  -     TSH; Future  -     Vitamin B12; Future  -     Vitamin D,25-Hydroxy; Future    6. History of mouth cancer  -     CBC & Differential; Future  -     Comprehensive Metabolic Panel; Future  -     Lipid Panel; Future  -     TSH; Future  -     Vitamin B12; Future  -     Vitamin D,25-Hydroxy; Future    7. Vitamin D deficiency  -     CBC & Differential; Future  -     Comprehensive Metabolic Panel; Future  -     Lipid Panel; Future  -     TSH; Future  -     Vitamin B12; Future  -     Vitamin D,25-Hydroxy; Future      I would like to see his weight go up some, encouraged caloric intake with shakes, protein and healthy fats  Labs sent  No refills needed  Return in about 6 months (around 7/2/2025) for Annual, collect labs today.

## 2025-01-06 DIAGNOSIS — N28.9 DECREASED RENAL FUNCTION: Primary | ICD-10-CM

## 2025-01-27 DIAGNOSIS — I10 BENIGN ESSENTIAL HYPERTENSION: ICD-10-CM

## 2025-01-28 RX ORDER — NIFEDIPINE 30 MG/1
30 TABLET, EXTENDED RELEASE ORAL DAILY
Qty: 90 TABLET | Refills: 0 | Status: SHIPPED | OUTPATIENT
Start: 2025-01-28

## 2025-01-28 NOTE — TELEPHONE ENCOUNTER
Rx Refill Note  Requested Prescriptions     Pending Prescriptions Disp Refills    NIFEdipine XL (PROCARDIA XL) 30 MG 24 hr tablet [Pharmacy Med Name: NIFEdipine ER Osmotic Release Oral Tablet Extended Release 24 Hour 30 MG] 90 tablet 0     Sig: TAKE 1 TABLET BY MOUTH EVERY DAY      Last office visit with prescribing clinician: 1/2/2025     Next office visit with prescribing clinician: 4/17/2025     Mary Sanz  01/28/25, 10:06 EST

## 2025-02-16 DIAGNOSIS — R00.2 PALPITATIONS: ICD-10-CM

## 2025-02-16 DIAGNOSIS — K21.9 GASTROESOPHAGEAL REFLUX DISEASE, UNSPECIFIED WHETHER ESOPHAGITIS PRESENT: ICD-10-CM

## 2025-02-16 DIAGNOSIS — I44.0 1ST DEGREE AV BLOCK: ICD-10-CM

## 2025-02-16 DIAGNOSIS — E03.9 ACQUIRED HYPOTHYROIDISM: ICD-10-CM

## 2025-02-18 RX ORDER — LEVOTHYROXINE SODIUM 100 MCG
100 TABLET ORAL DAILY
Qty: 30 TABLET | Refills: 2 | Status: SHIPPED | OUTPATIENT
Start: 2025-02-18

## 2025-02-18 NOTE — TELEPHONE ENCOUNTER
Rx Refill Note  Requested Prescriptions     Pending Prescriptions Disp Refills    Synthroid 100 MCG tablet [Pharmacy Med Name: SYNTHROID 0.1MG (100MCG)TABLETS] 30 tablet 2     Sig: TAKE 1 TABLET BY MOUTH DAILY      Last office visit with prescribing clinician: 1/2/2025     Next office visit with prescribing clinician: 4/17/2025       Mary Sanz  02/18/25, 13:28 EST

## 2025-04-17 ENCOUNTER — OFFICE VISIT (OUTPATIENT)
Dept: INTERNAL MEDICINE | Facility: CLINIC | Age: 70
End: 2025-04-17
Payer: COMMERCIAL

## 2025-04-17 ENCOUNTER — LAB (OUTPATIENT)
Dept: INTERNAL MEDICINE | Facility: CLINIC | Age: 70
End: 2025-04-17
Payer: COMMERCIAL

## 2025-04-17 VITALS
DIASTOLIC BLOOD PRESSURE: 82 MMHG | TEMPERATURE: 97.3 F | HEART RATE: 74 BPM | SYSTOLIC BLOOD PRESSURE: 132 MMHG | OXYGEN SATURATION: 98 % | BODY MASS INDEX: 20.56 KG/M2 | HEIGHT: 74 IN | WEIGHT: 160.2 LBS

## 2025-04-17 DIAGNOSIS — N28.9 DECREASED RENAL FUNCTION: ICD-10-CM

## 2025-04-17 DIAGNOSIS — N40.0 BENIGN PROSTATIC HYPERPLASIA, UNSPECIFIED WHETHER LOWER URINARY TRACT SYMPTOMS PRESENT: ICD-10-CM

## 2025-04-17 DIAGNOSIS — I47.10 PAROXYSMAL SUPRAVENTRICULAR TACHYCARDIA: ICD-10-CM

## 2025-04-17 DIAGNOSIS — K21.9 GASTROESOPHAGEAL REFLUX DISEASE, UNSPECIFIED WHETHER ESOPHAGITIS PRESENT: ICD-10-CM

## 2025-04-17 DIAGNOSIS — E78.5 DYSLIPIDEMIA: ICD-10-CM

## 2025-04-17 DIAGNOSIS — R00.2 PALPITATIONS: ICD-10-CM

## 2025-04-17 DIAGNOSIS — I65.22 CAROTID STENOSIS, ASYMPTOMATIC, LEFT: ICD-10-CM

## 2025-04-17 DIAGNOSIS — E03.9 ACQUIRED HYPOTHYROIDISM: ICD-10-CM

## 2025-04-17 DIAGNOSIS — Z00.00 ROUTINE GENERAL MEDICAL EXAMINATION AT A HEALTH CARE FACILITY: Primary | ICD-10-CM

## 2025-04-17 DIAGNOSIS — I10 ESSENTIAL HYPERTENSION: ICD-10-CM

## 2025-04-17 DIAGNOSIS — I44.0 1ST DEGREE AV BLOCK: ICD-10-CM

## 2025-04-17 DIAGNOSIS — Z85.810 HISTORY OF TONGUE CANCER: ICD-10-CM

## 2025-04-17 LAB
ANION GAP SERPL CALCULATED.3IONS-SCNC: 10.3 MMOL/L (ref 5–15)
BUN SERPL-MCNC: 16 MG/DL (ref 8–23)
BUN/CREAT SERPL: 15.4 (ref 7–25)
CALCIUM SPEC-SCNC: 9.3 MG/DL (ref 8.6–10.5)
CHLORIDE SERPL-SCNC: 101 MMOL/L (ref 98–107)
CO2 SERPL-SCNC: 29.7 MMOL/L (ref 22–29)
CREAT SERPL-MCNC: 1.04 MG/DL (ref 0.76–1.27)
EGFRCR SERPLBLD CKD-EPI 2021: 78.7 ML/MIN/1.73
GLUCOSE SERPL-MCNC: 76 MG/DL (ref 65–99)
POTASSIUM SERPL-SCNC: 4 MMOL/L (ref 3.5–5.2)
SODIUM SERPL-SCNC: 141 MMOL/L (ref 136–145)

## 2025-04-17 PROCEDURE — 80048 BASIC METABOLIC PNL TOTAL CA: CPT | Performed by: NURSE PRACTITIONER

## 2025-04-17 PROCEDURE — 36415 COLL VENOUS BLD VENIPUNCTURE: CPT | Performed by: NURSE PRACTITIONER

## 2025-04-17 PROCEDURE — 99397 PER PM REEVAL EST PAT 65+ YR: CPT | Performed by: NURSE PRACTITIONER

## 2025-04-17 RX ORDER — LEVOTHYROXINE SODIUM 100 MCG
100 TABLET ORAL DAILY
Qty: 90 TABLET | Refills: 1 | Status: SHIPPED | OUTPATIENT
Start: 2025-04-17

## 2025-04-17 NOTE — PROGRESS NOTES
Subjective   Boone Riley is a 69 y.o. male    Chief Complaint   Patient presents with    Annual Exam     History of Present Illness     Frederick works at [employer]. They were running tests at work and did a carotid duplex on him that led to a CTA of the neck which revealed 80 % stenosis of the left carotid. He was seen Dr. Ballesteros, underwent an angiogram, that led to an angioplasty/stent placement for asymptomatic, high-grade left common carotid stenosis on 4/18/2024.  Reports that he is feeling well since surgery.  No complications.       Had PET in relation to this as well. Groundglass was noted in lung. Was referred to Pulm and had f/u CT.  Was released from their care in the fall of 2024.  They suggested seeing Dr. Henry at  [facility] to see if they could help with his swallowing.  He has not pursued this since he follows with Dr. CAMACHO.     Palps - during his preop work up, he was noted to [id number] degree AV block and RSR pattern in V1, PACs.  At postop follow-up, he did complain of palpitations.  Has worn a Holter since.  Did have some runs of SVT.  Was referred to cardiology and was seen on 7/8/2024 - no med changes     GERD-PPI was changed from omeprazole to pantoprazole at postop visit due to interaction with omeprazole and Plavix.  At his follow-up with Dr. Ballesteros, Plavix was stopped.  He will be on aspirin indefinitely.  States that he is tolerating the pantoprazole well.  No breakthroughs of GERD.     HL-was started on Lipitor 20 mg at last visit.  He has been taking 10 mg daily.  Lab Results   Component Value Date    CHOL 148 01/02/2025    TRIG 72 01/02/2025    HDL 65 (H) 01/02/2025    LDL 69 01/02/2025     HTN-chronic and stable on nifedipine XL 30 mg 1 p.o. daily.  Blood pressure within acceptable limits today.     Hypothyroid-was changed from generic to brand Synthroid at last visit to see if this would make a difference in his palpitations.  Unsure if the change to brand Synthroid has had much effect, but his  palpitations are improved.     BPH - Seeing Urology ([facility], now [facility]).  Takes PRN meds for ED.  Was given 20mg Sildenafil that he takes PRN/rarely. He did have a Urolift 7/20/2022. He has been able to d/c the flomax       H/O throat (base of tongue) cancer.  Dx'd in 2008.  Followed by Dr. CAMACHO.  All recent reports have been good. Seeing him on a yearly basis now.      Vit D and B12 def - chronic/levels are low.  Not currently taking any supplements    Last kidney function was slightly decreased - will repeat today     Flu shot - 10/2024 @ work  COVID - 12/23/20, 1/22/21, declines booster  Tdap - 10/2024  Shingrix - 6/2022 and 8/2022  PSA - 3/2025 with urology  colon - 2019, due in 2029  Hep A - 2/2019, 8/2019    Diet    Exercise    Social History     Tobacco Use    Smoking status: Never     Passive exposure: Never    Smokeless tobacco: Never   Vaping Use    Vaping status: Never Used   Substance Use Topics    Alcohol use: No    Drug use: No         The following portions of the patient's history were reviewed and updated as appropriate: allergies, current medications, past family history, past medical history, past social history, past surgical history, and problem list.    Current Outpatient Medications:     aspirin 81 MG chewable tablet, Chew 1 tablet Daily., Disp: 30 tablet, Rfl: 5    atorvastatin (LIPITOR) 20 MG tablet, TAKE 1 TABLET BY MOUTH DAILY, Disp: 90 tablet, Rfl: 1    Cholecalciferol (Vitamin D3) 1.25 MG (03143 UT) capsule, Take 1 capsule by mouth., Disp: , Rfl:     NIFEdipine XL (PROCARDIA XL) 30 MG 24 hr tablet, TAKE 1 TABLET BY MOUTH EVERY DAY, Disp: 90 tablet, Rfl: 0    pantoprazole (PROTONIX) 40 MG EC tablet, TAKE 1 TABLET BY MOUTH DAILY, Disp: 90 tablet, Rfl: 1    sildenafil (REVATIO) 20 MG tablet, 2-5 PO as needed for sexual activity, Disp: 50 tablet, Rfl: 2    Synthroid 100 MCG tablet, Take 1 tablet by mouth Daily., Disp: 90 tablet, Rfl: 1     Review of Systems   Constitutional:  Negative for  appetite change, chills, fatigue, fever and unexpected weight change.   HENT:  Negative for congestion, ear pain, nosebleeds, rhinorrhea and tinnitus.    Eyes:  Negative for pain.   Respiratory:  Negative for cough, chest tightness and shortness of breath.    Cardiovascular:  Negative for chest pain, palpitations and leg swelling.   Gastrointestinal:  Negative for abdominal distention, abdominal pain, blood in stool, constipation, diarrhea, nausea and vomiting.   Endocrine: Negative for cold intolerance, heat intolerance, polydipsia, polyphagia and polyuria.   Genitourinary:  Negative for dysuria, flank pain, frequency, hematuria and urgency.   Musculoskeletal:  Negative for arthralgias, back pain, gait problem, joint swelling, myalgias and neck pain.   Skin:  Negative for color change, pallor, rash and wound.   Allergic/Immunologic: Negative for environmental allergies and food allergies.   Neurological:  Negative for dizziness, syncope, weakness, light-headedness, numbness and headaches.   Hematological:  Negative for adenopathy. Does not bruise/bleed easily.   Psychiatric/Behavioral:  Negative for behavioral problems and suicidal ideas. The patient is not nervous/anxious.        Objective   Physical Exam  Constitutional:       Appearance: He is well-developed.   HENT:      Head: Normocephalic and atraumatic.      Right Ear: Tympanic membrane, ear canal and external ear normal. There is no impacted cerumen.      Left Ear: Tympanic membrane, ear canal and external ear normal. There is no impacted cerumen.      Nose: Nose normal. No congestion.      Mouth/Throat:      Mouth: Mucous membranes are moist.      Pharynx: Oropharynx is clear. No oropharyngeal exudate.   Eyes:      Conjunctiva/sclera: Conjunctivae normal.      Pupils: Pupils are equal, round, and reactive to light.   Cardiovascular:      Rate and Rhythm: Normal rate and regular rhythm.      Pulses:           Carotid pulses are 2+ on the right side and 1+  "on the left side.     Heart sounds: Normal heart sounds.   Pulmonary:      Effort: Pulmonary effort is normal.      Breath sounds: Normal breath sounds.   Abdominal:      General: Bowel sounds are normal.      Palpations: Abdomen is soft.   Musculoskeletal:         General: Normal range of motion.      Cervical back: Normal range of motion.   Skin:     General: Skin is warm and dry.   Neurological:      Mental Status: He is alert and oriented to person, place, and time.      Deep Tendon Reflexes: Reflexes are normal and symmetric.   Psychiatric:         Behavior: Behavior normal.         Thought Content: Thought content normal.         Judgment: Judgment normal.       Vitals:    04/17/25 0907   BP: 132/82   Pulse: 74   Temp: 97.3 °F (36.3 °C)   TempSrc: Infrared   SpO2: 98%   Weight: 72.7 kg (160 lb 3.2 oz)   Height: 188 cm (74.02\")         Assessment & Plan   Diagnoses and all orders for this visit:    1. Routine general medical examination at a health care facility (Primary)    2. Palpitations  -     Synthroid 100 MCG tablet; Take 1 tablet by mouth Daily.  Dispense: 90 tablet; Refill: 1    3. 1st degree AV block  -     Synthroid 100 MCG tablet; Take 1 tablet by mouth Daily.  Dispense: 90 tablet; Refill: 1    4. Gastroesophageal reflux disease, unspecified whether esophagitis present  -     Synthroid 100 MCG tablet; Take 1 tablet by mouth Daily.  Dispense: 90 tablet; Refill: 1    5. Acquired hypothyroidism  -     Synthroid 100 MCG tablet; Take 1 tablet by mouth Daily.  Dispense: 90 tablet; Refill: 1    6. Carotid stenosis, asymptomatic, left    7. Essential hypertension    8. Dyslipidemia    9. Paroxysmal supraventricular tachycardia    10. Benign prostatic hyperplasia, unspecified whether lower urinary tract symptoms present    11. History of tongue cancer      Labs reviewed from 1/25, will ck repeat BMP today  No need for additional labs  [facility]roid [facility] to see if this helps " "with cost  Counseling - diet and exercise, increase calories and \"good fats\"  Return in about 6 months (around 10/17/2025) for f/u, collect labs today.           "

## 2025-04-22 DIAGNOSIS — I10 BENIGN ESSENTIAL HYPERTENSION: ICD-10-CM

## 2025-04-23 RX ORDER — NIFEDIPINE 30 MG/1
30 TABLET, EXTENDED RELEASE ORAL DAILY
Qty: 90 TABLET | Refills: 1 | Status: SHIPPED | OUTPATIENT
Start: 2025-04-23

## 2025-07-03 ENCOUNTER — OFFICE VISIT (OUTPATIENT)
Dept: INTERNAL MEDICINE | Facility: CLINIC | Age: 70
End: 2025-07-03
Payer: COMMERCIAL

## 2025-07-03 VITALS
OXYGEN SATURATION: 97 % | SYSTOLIC BLOOD PRESSURE: 184 MMHG | TEMPERATURE: 97.7 F | WEIGHT: 159.4 LBS | HEART RATE: 68 BPM | DIASTOLIC BLOOD PRESSURE: 114 MMHG | BODY MASS INDEX: 20.46 KG/M2 | HEIGHT: 74 IN

## 2025-07-03 DIAGNOSIS — I10 ESSENTIAL HYPERTENSION: Primary | ICD-10-CM

## 2025-07-03 PROCEDURE — 99214 OFFICE O/P EST MOD 30 MIN: CPT | Performed by: NURSE PRACTITIONER

## 2025-07-03 RX ORDER — LOSARTAN POTASSIUM 25 MG/1
25 TABLET ORAL DAILY
Qty: 30 TABLET | Refills: 2 | Status: SHIPPED | OUTPATIENT
Start: 2025-07-03

## 2025-07-03 RX ORDER — AMOXICILLIN 400 MG/5ML
6.25 POWDER, FOR SUSPENSION ORAL 3 TIMES DAILY
COMMUNITY
Start: 2025-05-22 | End: 2025-07-03

## 2025-07-03 NOTE — PROGRESS NOTES
Subjective   Boone Riley is a 69 y.o. male    Chief Complaint   Patient presents with    Hypertension     Patient has had several episodes of low blood pressure.  Has discontinued his Procardia approximately 10 days. Experiencing lightheadedness.  Monitors his blood pressure daily.       Hypertension  Associated symptoms: no chest pain, no shortness of breath and no dizziness       HTN - chronic; has been procardia XL 30 mg for several years.  He started feeling like his BP was running low.  He was dizzy, lightheaded and felt like he was going to pass out.  Checked his BP at home and it was in the 60's/40's.  He stopped taking this med about 10 days ago.  Now BP is very high.  He denies CP, SOA or any other associated sx's.  He has had a very stressful day at work today.    The following portions of the patient's history were reviewed and updated as appropriate: allergies, current medications, past family history, past medical history, past social history, past surgical history, and problem list.    Current Outpatient Medications:     aspirin 81 MG chewable tablet, Chew 1 tablet Daily., Disp: 30 tablet, Rfl: 5    atorvastatin (LIPITOR) 20 MG tablet, TAKE 1 TABLET BY MOUTH DAILY (Patient taking differently: Take 1 tablet by mouth Daily. Patient takes occassionally.), Disp: 90 tablet, Rfl: 1    Cholecalciferol (Vitamin D3) 1.25 MG (25223 UT) capsule, Take 1 capsule by mouth., Disp: , Rfl:     pantoprazole (PROTONIX) 40 MG EC tablet, TAKE 1 TABLET BY MOUTH DAILY, Disp: 90 tablet, Rfl: 1    sildenafil (REVATIO) 20 MG tablet, 2-5 PO as needed for sexual activity (Patient taking differently: 2-5 PO as needed for sexual activity.  Taking occasionally.), Disp: 50 tablet, Rfl: 2    Synthroid 100 MCG tablet, Take 1 tablet by mouth Daily., Disp: 90 tablet, Rfl: 1    losartan (Cozaar) 25 MG tablet, Take 1 tablet by mouth Daily., Disp: 30 tablet, Rfl: 2     Review of Systems   Constitutional:  Negative for chills, fatigue  "and fever.   Respiratory:  Negative for cough, chest tightness and shortness of breath.    Cardiovascular:  Negative for chest pain.   Gastrointestinal:  Negative for abdominal pain, diarrhea, nausea and vomiting.   Endocrine: Negative for cold intolerance and heat intolerance.   Musculoskeletal:  Negative for arthralgias.   Neurological:  Negative for dizziness.       Objective   Physical Exam  Constitutional:       Appearance: He is well-developed.   HENT:      Head: Normocephalic and atraumatic.   Eyes:      Conjunctiva/sclera: Conjunctivae normal.      Pupils: Pupils are equal, round, and reactive to light.   Cardiovascular:      Rate and Rhythm: Normal rate and regular rhythm.      Heart sounds: Normal heart sounds.   Pulmonary:      Effort: Pulmonary effort is normal.      Breath sounds: Normal breath sounds.   Abdominal:      General: Bowel sounds are normal.      Palpations: Abdomen is soft.   Musculoskeletal:         General: Normal range of motion.      Cervical back: Normal range of motion.   Skin:     General: Skin is warm and dry.   Neurological:      Mental Status: He is alert and oriented to person, place, and time.      Deep Tendon Reflexes: Reflexes are normal and symmetric.   Psychiatric:         Behavior: Behavior normal.         Thought Content: Thought content normal.         Judgment: Judgment normal.       Vitals:    07/03/25 1527   BP: (!) 184/114   BP Location: Left arm   Patient Position: Sitting   Cuff Size: Adult   Pulse: 68   Temp: 97.7 °F (36.5 °C)   TempSrc: Infrared   SpO2: 97%   Weight: 72.3 kg (159 lb 6.4 oz)   Height: 188 cm (74\")         Assessment & Plan   Diagnoses and all orders for this visit:    1. Essential hypertension (Primary)  -     losartan (Cozaar) 25 MG tablet; Take 1 tablet by mouth Daily.  Dispense: 30 tablet; Refill: 2      D/C procardia  Losartan 25 mg daily added  Keep BP log  Return in about 2 weeks (around 7/17/2025) for f/u.             "

## 2025-07-23 ENCOUNTER — TELEMEDICINE (OUTPATIENT)
Dept: INTERNAL MEDICINE | Facility: CLINIC | Age: 70
End: 2025-07-23
Payer: COMMERCIAL

## 2025-07-23 DIAGNOSIS — I10 ESSENTIAL HYPERTENSION: ICD-10-CM

## 2025-07-23 DIAGNOSIS — R09.89 LABILE BLOOD PRESSURE: Primary | ICD-10-CM

## 2025-07-23 PROCEDURE — 99214 OFFICE O/P EST MOD 30 MIN: CPT | Performed by: NURSE PRACTITIONER

## 2025-07-23 NOTE — PROGRESS NOTES
Subjective   Boone Riley is a 69 y.o. male    Chief Complaint   Patient presents with    Hypertension     History of Present Illness     This was an audio and video enabled telemedicine encounter.    You have chosen to receive care through a telehealth visit.  Do you consent to use a video/audio connection for your medical care today? Yes    My location: my office at [facility] [place]  Pt location: pt's office    HTN: chronic; has been procardia XL 30 mg for several years.  Prior to his last visit Frederick started feeling like his BP was running low.  He was dizzy, lightheaded and felt like he was going to pass out.  Checked his BP at home and it was in the 60's/40's.  He stopped taking this med about 10 days prior to his last appt.  BP then spiked and was in the 180's/110's at last visit.  We initiated Losartan 25 mg daily.  He presents today for f/u via telehealth with a BP log.  Has been taking his BP TID.  His morning readings are in the 110's/60's, afternoon readings are high and nighttime readings are again lower.  He has been taking the med mid to late day.  He denies any med SEs.  He has held this med a few days due to lower readings.     The following portions of the patient's history were reviewed and updated as appropriate: allergies, current medications, past family history, past medical history, past social history, past surgical history, and problem list.    Current Outpatient Medications:     aspirin 81 MG chewable tablet, Chew 1 tablet Daily., Disp: 30 tablet, Rfl: 5    atorvastatin (LIPITOR) 20 MG tablet, TAKE 1 TABLET BY MOUTH DAILY (Patient taking differently: Take 1 tablet by mouth Daily. Patient takes occassionally.), Disp: 90 tablet, Rfl: 1    Cholecalciferol (Vitamin D3) 1.25 MG (49621 UT) capsule, Take 1 capsule by mouth., Disp: , Rfl:     losartan (Cozaar) 25 MG tablet, Take 1 tablet by mouth Daily., Disp: 30 tablet, Rfl: 2    pantoprazole (PROTONIX) 40 MG EC tablet, TAKE 1 TABLET BY MOUTH DAILY, Disp:  90 tablet, Rfl: 1    sildenafil (REVATIO) 20 MG tablet, 2-5 PO as needed for sexual activity (Patient taking differently: 2-5 PO as needed for sexual activity.  Taking occasionally.), Disp: 50 tablet, Rfl: 2    Synthroid 100 MCG tablet, Take 1 tablet by mouth Daily., Disp: 90 tablet, Rfl: 1     Review of Systems   Constitutional:  Negative for chills, fatigue and fever.   Respiratory:  Negative for cough, chest tightness and shortness of breath.    Cardiovascular:  Negative for chest pain.   Gastrointestinal:  Negative for abdominal pain, diarrhea, nausea and vomiting.   Endocrine: Negative for cold intolerance and heat intolerance.   Musculoskeletal:  Negative for arthralgias.   Neurological:  Negative for dizziness and headaches.       Objective   Physical Exam  Constitutional:       Appearance: Normal appearance.   HENT:      Head: Normocephalic.      Nose: Nose normal. No congestion.      Mouth/Throat:      Mouth: Mucous membranes are moist.      Pharynx: Oropharynx is clear. No oropharyngeal exudate.   Eyes:      Conjunctiva/sclera: Conjunctivae normal.      Pupils: Pupils are equal, round, and reactive to light.   Pulmonary:      Effort: Pulmonary effort is normal.   Musculoskeletal:         General: Normal range of motion.      Cervical back: Normal range of motion.   Neurological:      Mental Status: He is alert and oriented to person, place, and time.   Psychiatric:         Mood and Affect: Mood normal.         Behavior: Behavior normal.       There were no vitals filed for this visit.      Assessment & Plan   Diagnoses and all orders for this visit:    1. Labile blood pressure (Primary)  -     Ambulatory Referral to Cardiology for Hypertension    2. Essential hypertension      We will continue with Losartan 25 mg daily  Encouraged him to take this every day as directed  Referred back to Cards due to labile BP, may ambulatory BP monitoring  Return for Next scheduled follow up.

## 2025-07-28 NOTE — PROGRESS NOTES
"[facility]  Subjective:     Encounter Date: 07/29/2025      Patient ID: Boone Riley is a 69 y.o. male.    Chief Complaint: Paroxysmal supraventricular tachycardia (F/U)      PROBLEM LIST:  Paroxysmal SVT   S/p remote cardiac ablation reportedly at [facility]  Holter, 04/23/2024: Frequent, non-sustained SVT, longest episode 12 beats, rate 130 bpm.. 6.6% supra-ventricular ectopy. 1 ventricular triplet, rare PVCs.   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 Ballesteros  Carotid duplex, 04/18/2024: Bilateral ICA <50% stenosis. Left proximal to mid carotid artery stent is patent without evidence of in-stent restenosis.   Stress echo, 08/06/2024; EF 55%. Concentric LVH. Trace MR. Trace TR, RVSP 22 mmHg.   Carotid duplex, 11/13/2024; Antegrade right vertebral flow.  Less than 50% left ISR  Hypertension  Dyslipidemia  Hypothyroidism  History of oral cancer  S/p left modified radical neck dissection and radiation therapy      History of Present Illness  Boone Riley returns today for a follow up with a history of PSVT, carotid stenosis and cardiac risk factors. Since last visit, he has been monitoring his blood pressure with pressures ranging to the low range up to the 160's to 170's systolic. His blood pressure has been low in the mornings and then about mid-day his blood pressure will be relatively high. He states when his blood pressure is low, he feels \"bad\" almost like a pre-syncope episode will happen. He states when he feels this way he will lay down and rest. He still does still have skipped beats a lot but denies that his heart is racing. He has lost some weight but blames it on the cancer making it hard to eat. He doesn't avoid eating salt although he does not eat a lot of it. He does drink water all the time. He doesn't feel like his blood " pressure is low at work and only at home. He previously took his losartan 25 mg in the mornings but has changed to taking it about mid-day. Patient denies chest pain, shortness of breath, orthopnea, palpitations, edema, dizziness, and syncope.      No Known Allergies      Current Outpatient Medications:     aspirin 81 MG chewable tablet, Chew 1 tablet Daily., Disp: 30 tablet, Rfl: 5    atorvastatin (LIPITOR) 20 MG tablet, TAKE 1 TABLET BY MOUTH DAILY (Patient taking differently: Take 0.5 tablets by mouth Every Night.), Disp: 90 tablet, Rfl: 1    Cholecalciferol (Vitamin D3) 1.25 MG (90097 UT) capsule, Take 1 capsule by mouth., Disp: , Rfl:     pantoprazole (PROTONIX) 40 MG EC tablet, TAKE 1 TABLET BY MOUTH DAILY, Disp: 90 tablet, Rfl: 1    sildenafil (REVATIO) 20 MG tablet, 2-5 PO as needed for sexual activity (Patient taking differently: 2-5 PO as needed for sexual activity.  Taking occasionally.), Disp: 50 tablet, Rfl: 2    Synthroid 100 MCG tablet, Take 1 tablet by mouth Daily., Disp: 90 tablet, Rfl: 1    NIFEdipine (PROCARDIA) 10 MG capsule, Take 1 capsule by mouth Daily With Lunch., Disp: 30 capsule, Rfl: 11    The following portions of the patient's history were reviewed and updated as appropriate: allergies, current medications, past family history, past medical history, past social history, past surgical history and problem list.    Review of Systems   Constitutional: Negative.   Cardiovascular:  Negative for chest pain, dyspnea on exertion, leg swelling, palpitations and syncope.   Respiratory: Negative.  Negative for shortness of breath.    Hematologic/Lymphatic: Negative for bleeding problem. Does not bruise/bleed easily.   Skin:  Negative for rash.   Musculoskeletal:  Negative for muscle weakness and myalgias.   Gastrointestinal:  Negative for heartburn, nausea and vomiting.   Neurological:  Negative for dizziness, light-headedness, loss of balance and numbness.          Objective:     Vitals:     "07/29/25 0854   BP: 106/60   BP Location: Right arm   Patient Position: Sitting   Cuff Size: Adult   Pulse: 81   SpO2: 99%   Weight: 71.1 kg (156 lb 12.8 oz)   Height: 188 cm (74\")         Constitutional:       Appearance: Well-developed.   Neck:      Thyroid: No thyromegaly.      Vascular: No carotid bruit or JVD.   Pulmonary:      Breath sounds: Normal breath sounds.   Cardiovascular:      Regular rhythm.      No gallop. No S3 and S4 gallop.   Pulses:     Intact distal pulses.      Carotid: 2+ bilaterally.     Radial: 2+ bilaterally.  Edema:     Peripheral edema absent.   Abdominal:      General: Bowel sounds are normal.      Palpations: Abdomen is soft. There is no abdominal mass.      Tenderness: There is no abdominal tenderness.   Skin:     General: Skin is warm and dry.      Findings: No rash.   Neurological:      Mental Status: Alert and oriented to person, place, and time.         Lab Review:  Lab Results   Component Value Date    GLUCOSE 76 04/17/2025    BUN 16 04/17/2025    CREATININE 1.04 04/17/2025    EGFRIFNONA 71 09/07/2021    BCR 15.4 04/17/2025    K 4.0 04/17/2025    CO2 29.7 (H) 04/17/2025    CALCIUM 9.3 04/17/2025    ALBUMIN 4.7 01/02/2025    ALKPHOS 72 01/02/2025    AST 22 01/02/2025    ALT 12 01/02/2025     Lab Results   Component Value Date    CHOL 148 01/02/2025    TRIG 72 01/02/2025    HDL 65 (H) 01/02/2025    LDL 69 01/02/2025      Lab Results   Component Value Date    WBC 10.51 01/02/2025    RBC 5.73 01/02/2025    HGB 15.9 01/02/2025    HCT 49.4 01/02/2025    MCV 86.2 01/02/2025     01/02/2025     Lab Results   Component Value Date    TSH 2.750 01/02/2025       Procedures      Advance Care Planning   ACP discussion was held with the patient during this visit. Patient has an advance directive in EMR which is still valid.            Assessment:   Diagnoses and all orders for this visit:    1. Paroxysmal supraventricular tachycardia (Primary)    2. Carotid stenosis, asymptomatic, " left    3. Essential hypertension    4. Dyslipidemia    Other orders  -     NIFEdipine (PROCARDIA) 10 MG capsule; Take 1 capsule by mouth Daily With Lunch.  Dispense: 30 capsule; Refill: 11        Impression:  PSVT: Patient states he still has occasional premature ventricular contractions but denies tachycardia.   Carotid stenosis: He does have an upcoming appointment for a carotid duplex in the fall this year. We will try to have this moved up earlier to reevaluate carotid stenosis. Continue on aspirin 81 mg daily for antiplatelet therapy.    Essential hypertension: Discontinue losartan 25 mg and initiate nifedipine 10 mg once a day. He was advised to take it about mid-day/evening to help with control of blood pressure. Patient may contact our office if he feels like nifedipine is not working and we can initiate a different medication.   Dyslipidemia: Continue on atorvastatin 20 mg as directed for hyperlipidemia.      Plan:  He does have an upcoming appointment for a carotid duplex in the fall this year. We will try to have this moved up earlier to reevaluate carotid stenosis.  Will carotid ISR given his labile blood pressure  Discontinue losartan 25 mg and initiate nifedipine 10 mg once a day around noon.  If blood pressure less than 150, patient will not take nifedipine  Continue current medications.  Keep scheduled appointment for 11/10/2025, or can follow up sooner as needed.      Scribed for Christiano García MD by Ute Barker. 7/29/2025 09:12 EDT         Christiano García MD      Please note that portions of this note may have been completed with a voice recognition program. Efforts were made to edit the dictations, but occasionally words are mistranscribed.

## 2025-07-29 ENCOUNTER — OFFICE VISIT (OUTPATIENT)
Dept: CARDIOLOGY | Facility: CLINIC | Age: 70
End: 2025-07-29
Payer: COMMERCIAL

## 2025-07-29 VITALS
OXYGEN SATURATION: 99 % | BODY MASS INDEX: 20.12 KG/M2 | DIASTOLIC BLOOD PRESSURE: 60 MMHG | HEIGHT: 74 IN | WEIGHT: 156.8 LBS | HEART RATE: 81 BPM | SYSTOLIC BLOOD PRESSURE: 106 MMHG

## 2025-07-29 DIAGNOSIS — I47.10 PAROXYSMAL SUPRAVENTRICULAR TACHYCARDIA: Primary | ICD-10-CM

## 2025-07-29 DIAGNOSIS — E78.5 DYSLIPIDEMIA: ICD-10-CM

## 2025-07-29 DIAGNOSIS — I65.22 CAROTID STENOSIS, ASYMPTOMATIC, LEFT: ICD-10-CM

## 2025-07-29 DIAGNOSIS — I10 ESSENTIAL HYPERTENSION: ICD-10-CM

## 2025-07-29 PROCEDURE — 99214 OFFICE O/P EST MOD 30 MIN: CPT | Performed by: INTERNAL MEDICINE

## 2025-07-29 RX ORDER — NIFEDIPINE 10 MG/1
10 CAPSULE ORAL
Qty: 30 CAPSULE | Refills: 11 | Status: SHIPPED | OUTPATIENT
Start: 2025-07-29

## (undated) DEVICE — ROTATING HEMOSTATIC VALVE .096": Brand: RHV

## (undated) DEVICE — VIATRAC 14 PLUS PERIPHERAL DILATATION CATHETER 7.0 MM X 30 MM X 135 CM: Brand: VIATRAC

## (undated) DEVICE — STPCK LP 1WY RA 200PSI

## (undated) DEVICE — DEV INFL MONARCH 25W

## (undated) DEVICE — CVR PROB ULTRASND/TRANSD W/GEL 7X11IN STRL

## (undated) DEVICE — LEX NEURO ANGIOGRAPHY: Brand: MEDLINE INDUSTRIES, INC.

## (undated) DEVICE — GLIDESHEATH SLENDER STAINLESS STEEL KIT: Brand: GLIDESHEATH SLENDER

## (undated) DEVICE — Device

## (undated) DEVICE — STPCK 3/WY HP M/RA W/OFF/HNDL 1050PSI STRL

## (undated) DEVICE — CATH ANGIO SIM2 HNB5.0 .038 100 P NS

## (undated) DEVICE — RADIFOCUS GLIDEWIRE: Brand: GLIDEWIRE

## (undated) DEVICE — TR BAND RADIAL ARTERY COMPRESSION DEVICE: Brand: TR BAND

## (undated) DEVICE — RADIFOCUS TORQUE DEVICE MULTI-TORQUE VISE: Brand: RADIFOCUS TORQUE DEVICE

## (undated) DEVICE — R2P DESTINATION SLENDER GUIDING SHEATH: Brand: R2P DESTINATION SLENDER

## (undated) DEVICE — EMBOSHIELD NAV6 EMBOLIC PROTECTION SYSTEM 7.2 MM X 190 CM: Brand: EMBOSHIELD  NAV6

## (undated) DEVICE — LIMB HOLDER, WRIST/ANKLE: Brand: DEROYAL